# Patient Record
Sex: MALE | Race: WHITE | NOT HISPANIC OR LATINO | Employment: OTHER | URBAN - NONMETROPOLITAN AREA
[De-identification: names, ages, dates, MRNs, and addresses within clinical notes are randomized per-mention and may not be internally consistent; named-entity substitution may affect disease eponyms.]

---

## 2020-05-09 ENCOUNTER — HISTORICAL (OUTPATIENT)
Dept: ADMINISTRATIVE | Facility: HOSPITAL | Age: 55
End: 2020-05-09

## 2020-05-09 LAB
ANION GAP SERPL CALC-SCNC: 7.9 MEQ/L (ref 10–20)
BASOPHILS NFR BLD: 0 10 (ref 0–0.1)
BASOPHILS NFR BLD: 0.3 % (ref 0–1.5)
BNP SERPL-MCNC: 33 PG/ML (ref 0–136)
BUN SERPL-MCNC: 10 MG/DL (ref 7–18)
CALCIUM SERPL-MCNC: 7.9 MG/DL (ref 8.5–10.1)
CHLORIDE SERPL-SCNC: 111 MMOL/L (ref 98–107)
CO2 SERPL-SCNC: 27 MMOL/L (ref 22–32)
CREAT SERPL-MCNC: 0.49 MG/DL (ref 0.7–1.3)
EGFR: 189 ML/MIN/1.73M
EOSINOPHIL NFR BLD: 0.4 10 (ref 0–0.7)
EOSINOPHIL NFR BLD: 4.8 % (ref 0–7)
ERYTHROCYTE [DISTWIDTH] IN BLOOD BY AUTOMATED COUNT: 11.9 % (ref 11.5–14.5)
GLUCOSE SERPL-MCNC: 118 MG/DL (ref 70–99)
GRAN #: 5.48 10 (ref 2–7.5)
GRAN%: 0.2 %
GRAN%: 61.7 % (ref 50–80)
HCT VFR BLD AUTO: 36.8 % (ref 43.5–53.7)
HGB BLD-MCNC: 12.1 G/DL (ref 14.1–18.1)
IMMATURE GRANULOCYTES #: 0.02 10
LYMPH #: 2.3 10 (ref 1–3.5)
LYMPH%: 26.1 % (ref 12–50)
MAGNESIUM SERPL-MCNC: 2 MG/DL (ref 1.8–2.4)
MCH RBC QN AUTO: 30.6 PG (ref 27–31)
MCHC RBC AUTO-ENTMCNC: 32.9 G% (ref 32–35)
MCV RBC AUTO: 92.9 FL (ref 80–97)
MONO #: 0.6 10 (ref 0–0.8)
MONO%: 6.9 % (ref 0–12)
OSMOC: 283 MOSM/KG (ref 275–295)
PMV BLD AUTO: 210 10 (ref 142–424)
PMV BLD AUTO: 9.1 FL (ref 7.4–10.4)
POTASSIUM SERPL-SCNC: 3.9 MMOL/L (ref 3.5–5.1)
RBC # BLD AUTO: 3.96 M/UL (ref 4.69–6.13)
SODIUM BLD-SCNC: 142 MMOL/L (ref 136–145)
TROPONIN I SERPL DL<=0.01 NG/ML-MCNC: <0.02 NG/ML (ref 0–0.05)
WBC # BLD AUTO: 8.9 10 (ref 4–10.2)

## 2020-07-13 ENCOUNTER — HISTORICAL (OUTPATIENT)
Dept: ADMINISTRATIVE | Facility: HOSPITAL | Age: 55
End: 2020-07-13

## 2020-07-16 LAB — SARS-COV-2 RNA RESP QL NAA+PROBE: NOT DETECTED

## 2020-09-02 ENCOUNTER — HOSPITAL ENCOUNTER (EMERGENCY)
Facility: HOSPITAL | Age: 55
Discharge: HOME | End: 2020-09-02
Attending: EMERGENCY MEDICINE
Payer: MEDICAID

## 2020-09-02 VITALS
TEMPERATURE: 99 F | RESPIRATION RATE: 18 BRPM | HEIGHT: 71 IN | HEART RATE: 67 BPM | WEIGHT: 189 LBS | BODY MASS INDEX: 26.46 KG/M2 | OXYGEN SATURATION: 99 % | SYSTOLIC BLOOD PRESSURE: 165 MMHG | DIASTOLIC BLOOD PRESSURE: 95 MMHG

## 2020-09-02 DIAGNOSIS — K08.89 PAIN, DENTAL: Primary | ICD-10-CM

## 2020-09-02 PROCEDURE — 99283 EMERGENCY DEPT VISIT LOW MDM: CPT

## 2020-09-02 PROCEDURE — 25000003 PHARM REV CODE 250: Performed by: EMERGENCY MEDICINE

## 2020-09-02 RX ORDER — IBUPROFEN 600 MG/1
600 TABLET ORAL
Status: COMPLETED | OUTPATIENT
Start: 2020-09-02 | End: 2020-09-02

## 2020-09-02 RX ORDER — AZITHROMYCIN 250 MG/1
250 TABLET, FILM COATED ORAL DAILY
Qty: 6 TABLET | Refills: 0 | Status: SHIPPED | OUTPATIENT
Start: 2020-09-02 | End: 2021-05-19 | Stop reason: CLARIF

## 2020-09-02 RX ADMIN — IBUPROFEN 600 MG: 600 TABLET, FILM COATED ORAL at 01:09

## 2020-09-02 NOTE — ED PROVIDER NOTES
"Encounter Date: 9/2/2020       History     Chief Complaint   Patient presents with    Dental Pain     Left upper toothache/ swelling of mouth "for a while". Missed appt d/t covid     53 yo male here with left upper toothache for "a while." Unable to see dentist due to COVID-19 pandemic. No fever. No trismus. No swelling. Gradual onset. Similar to previous.         Review of patient's allergies indicates:   Allergen Reactions    Penicillins Shortness Of Breath     Past Medical History:   Diagnosis Date    GERD (gastroesophageal reflux disease)     Seizures      Past Surgical History:   Procedure Laterality Date    BRAIN SURGERY      HAND SURGERY      HERNIA REPAIR      tonsil and adnoids       Family History   Problem Relation Age of Onset    Heart disease Mother     Stroke Father      Social History     Tobacco Use    Smoking status: Current Some Day Smoker     Packs/day: 0.50   Substance Use Topics    Alcohol use: No    Drug use: No     Review of Systems   Constitutional: Negative.    Respiratory: Negative.    Cardiovascular: Negative.    Gastrointestinal: Negative.    All other systems reviewed and are negative.      Physical Exam     Initial Vitals [09/02/20 0131]   BP Pulse Resp Temp SpO2   (!) 170/97 67 18 99.2 °F (37.3 °C) 99 %      MAP       --         Physical Exam    Nursing note and vitals reviewed.  Constitutional: He appears well-developed and well-nourished. No distress.   HENT:   Head: Normocephalic and atraumatic.   Nose: Nose normal.   Mouth/Throat: No oropharyngeal exudate.   Poor dentition, multiple missing teeth   Eyes: Conjunctivae and EOM are normal. Right eye exhibits no discharge. Left eye exhibits no discharge. No scleral icterus.   Neck: Normal range of motion. Neck supple. No JVD present.   Cardiovascular: Normal rate, regular rhythm and normal heart sounds.   Pulmonary/Chest: Breath sounds normal. No stridor.   Abdominal: Soft. Bowel sounds are normal. He exhibits no " distension. There is no abdominal tenderness.   Musculoskeletal: Normal range of motion. No tenderness or edema.   Neurological: He is alert and oriented to person, place, and time.   Skin: Skin is warm. Capillary refill takes less than 2 seconds. No rash noted. No erythema.         ED Course   Procedures  Labs Reviewed - No data to display       Imaging Results    None                                          Clinical Impression:       ICD-10-CM ICD-9-CM   1. Pain, dental  K08.89 525.9         Disposition:   Disposition: Discharged  Condition: Stable     ED Disposition Condition    Discharge Stable        ED Prescriptions     Medication Sig Dispense Start Date End Date Auth. Provider    azithromycin (Z-CHAS) 250 MG tablet Take 1 tablet (250 mg total) by mouth once daily. Take first 2 tablets together, then 1 every day until finished. 6 tablet 9/2/2020  Garrick Dolan MD        Follow-up Information     Follow up With Specialties Details Why Contact Info    Gian Wallace MD Internal Medicine Schedule an appointment as soon as possible for a visit   [address] [place] [address] INTERNAL MEDICINE CLINIC  [facility] [address]  [phone]                                       Garrick Dolan MD  09/02/20 0139

## 2020-09-27 ENCOUNTER — HOSPITAL ENCOUNTER (EMERGENCY)
Facility: HOSPITAL | Age: 55
Discharge: HOME | End: 2020-09-27
Attending: EMERGENCY MEDICINE
Payer: MEDICAID

## 2020-09-27 VITALS
TEMPERATURE: 98 F | BODY MASS INDEX: 27.16 KG/M2 | HEIGHT: 71 IN | SYSTOLIC BLOOD PRESSURE: 142 MMHG | RESPIRATION RATE: 18 BRPM | WEIGHT: 194 LBS | OXYGEN SATURATION: 99 % | DIASTOLIC BLOOD PRESSURE: 101 MMHG | HEART RATE: 65 BPM

## 2020-09-27 DIAGNOSIS — H60.02 ABSCESS OF LEFT EARLOBE: Primary | ICD-10-CM

## 2020-09-27 PROCEDURE — 10060 I&D ABSCESS SIMPLE/SINGLE: CPT

## 2020-09-27 PROCEDURE — 25000003 PHARM REV CODE 250

## 2020-09-27 PROCEDURE — 25000003 PHARM REV CODE 250: Performed by: EMERGENCY MEDICINE

## 2020-09-27 PROCEDURE — 99283 EMERGENCY DEPT VISIT LOW MDM: CPT | Mod: 25

## 2020-09-27 RX ORDER — IBUPROFEN 400 MG/1
TABLET ORAL
Status: COMPLETED
Start: 2020-09-27 | End: 2020-09-27

## 2020-09-27 RX ORDER — CLINDAMYCIN HYDROCHLORIDE 150 MG/1
CAPSULE ORAL
Status: DISCONTINUED
Start: 2020-09-27 | End: 2020-09-27 | Stop reason: HOSPADM

## 2020-09-27 RX ORDER — LIDOCAINE HYDROCHLORIDE 10 MG/ML
1 INJECTION INFILTRATION; PERINEURAL
Status: COMPLETED | OUTPATIENT
Start: 2020-09-27 | End: 2020-09-27

## 2020-09-27 RX ORDER — CLINDAMYCIN HYDROCHLORIDE 300 MG/1
CAPSULE ORAL
Status: DISCONTINUED
Start: 2020-09-27 | End: 2020-09-27 | Stop reason: HOSPADM

## 2020-09-27 RX ORDER — IBUPROFEN 400 MG/1
400 TABLET ORAL
Status: COMPLETED | OUTPATIENT
Start: 2020-09-27 | End: 2020-09-27

## 2020-09-27 RX ORDER — LIDOCAINE HYDROCHLORIDE 10 MG/ML
INJECTION INFILTRATION; PERINEURAL
Status: COMPLETED
Start: 2020-09-27 | End: 2020-09-27

## 2020-09-27 RX ADMIN — IBUPROFEN 400 MG: 400 TABLET ORAL at 03:09

## 2020-09-27 RX ADMIN — LIDOCAINE HYDROCHLORIDE 1 ML: 10 INJECTION INFILTRATION; PERINEURAL at 02:09

## 2020-09-27 RX ADMIN — CLINDAMYCIN HYDROCHLORIDE 450 MG: 300 CAPSULE ORAL at 02:09

## 2020-09-27 RX ADMIN — LIDOCAINE HYDROCHLORIDE 1 ML: 10 INJECTION, SOLUTION INFILTRATION; PERINEURAL at 02:09

## 2020-09-27 NOTE — ED NOTES
NEUROLOGICAL:   Patient is awake , alert  and oriented x 4 . Pupils are PERRL. Gait is steady.   Moves all extremities without difficulty.   Patient reports no neuro complaints..  GCS 15    HEENT:   Head appears normocephalic  and symmetric .   Eyes appear WNL to both eyes. Patient reports no complaints  to both eyes .   Ears appear pink  and with drainage noted. Patient reports pain to left ears.   Nares appear patent . Patient reports no nose complaints .  Mouth appears moist, pink and teeth intact. Patient reports no mouth complaints.   Throat appears pink and moist . Patient reports no throat complaints.    CARDIOVASCULAR:   S1 and S2 present, no murmurs, gallops, or rubs, rate regular  and pulses palpable (2+)    On palpation no edema noted , noted to none.   Patient reports no CV complaints.  .   Patient vitals are WNL.    RESPIRATORY:   Airway Clear, Open, and Patent.  Respirations are even and unlabored.   Breath sounds clear  to all lung fields.   Patient reports no respiratory complaints.     GASTROINTESTINAL:   Abdomen is soft  and non-tender x 4 quadrants. Bowel sounds are normoactive to all quadrants .   Patient reports no GI complaints .     GENITOURINARY:   Patient reports no  complaints.     MUSCULOSKELETAL:   full range of motion to all extremities, no swelling noted , no tenderness noted and no weakness noted.   Patient reports no musculoskeletal complaints     SKIN:   Skin appears warm , dry , good turgor, color normal for race and intact. Patient reports no skin complaints.

## 2020-09-28 NOTE — ED PROVIDER NOTES
"Encounter Date: 9/27/2020       History     Chief Complaint   Patient presents with    Otalgia     "left ear pain x 1 week"     Pt is a 56 y/o M with PMHx as per below who presents with concern for swelling and pain to his left ear lobe.  Pt was given a Rx of ABX several days ago to treat for cellulitis and despite compliance with the ABX the swelling continued to progress.          Review of patient's allergies indicates:   Allergen Reactions    Penicillins Shortness Of Breath     Past Medical History:   Diagnosis Date    GERD (gastroesophageal reflux disease)     Seizures      Past Surgical History:   Procedure Laterality Date    BRAIN SURGERY      HAND SURGERY      HERNIA REPAIR      tonsil and adnoids       Family History   Problem Relation Age of Onset    Heart disease Mother     Stroke Father      Social History     Tobacco Use    Smoking status: Former Smoker     Packs/day: 0.50    Smokeless tobacco: Never Used   Substance Use Topics    Alcohol use: No    Drug use: No     Review of Systems   Skin: Positive for color change.        Abscess and cellulitis to the L ear lobe   All other systems reviewed and are negative.      Physical Exam     Initial Vitals [09/27/20 0138]   BP Pulse Resp Temp SpO2   (!) 142/101 65 18 97.9 °F (36.6 °C) 99 %      MAP       --         Physical Exam    Nursing note and vitals reviewed.  Constitutional: He appears well-developed and well-nourished. He is not diaphoretic. No distress.   HENT:   Head: Normocephalic and atraumatic.   Mouth/Throat: Oropharynx is clear and moist.   Eyes: Conjunctivae are normal. Right eye exhibits no discharge. Left eye exhibits no discharge. No scleral icterus.   Neck: No tracheal deviation present. No JVD present.   Cardiovascular: Normal rate, regular rhythm and normal heart sounds.   Pulmonary/Chest: No stridor. No respiratory distress.   Abdominal: Soft. He exhibits no distension. There is no abdominal tenderness.   Musculoskeletal: " Normal range of motion. No tenderness or edema.   Neurological: He is alert and oriented to person, place, and time. He has normal strength. GCS score is 15. GCS eye subscore is 4. GCS verbal subscore is 5. GCS motor subscore is 6.   FABIENNE with NGND's   Skin: Skin is warm and dry. Capillary refill takes less than 2 seconds. Abscess noted. No rash noted. There is erythema. No pallor.   Abscess with overlying cellulitis noted to the L ear lobe   Psychiatric: He has a normal mood and affect. His behavior is normal. Judgment and thought content normal.         ED Course   I & D - Incision and Drainage    Date/Time: 9/28/2020 3:46 AM  Location procedure was performed: [facility] EMERGENCY DEPARTMENT  Performed by: Rajan Cruz MD  Authorized by: Rajan Cruz MD   Pre-operative diagnosis: abscess of ear lobe  Post-operative diagnosis: abscess of ear lobe  Consent Done: Yes  Consent: Verbal consent obtained. Written consent not obtained.  Risks and benefits: risks, benefits and alternatives were discussed  Consent given by: patient  Patient understanding: patient states understanding of the procedure being performed  Patient consent: the patient's understanding of the procedure matches consent given  Procedure consent: procedure consent matches procedure scheduled  Patient identity confirmed: MRN and name  Type: abscess  Body area: head/neck  Location details: left external ear  Anesthesia: local infiltration    Anesthesia:  Local Anesthetic: lidocaine 1% without epinephrine  Anesthetic total: 3 mL  Scalpel size: 11  Incision type: single straight  Complexity: simple  Drainage: pus  Drainage amount: moderate  Wound treatment: wound left open and  incision  Complications: No  Estimated blood loss (mL): 5  Specimens: No  Implants: No  Patient tolerance: Patient tolerated the procedure well with no immediate complications        Labs Reviewed - No data to display       Imaging Results    None         Pt arrived alert,  afebrile, non-toxic in appearance, in no acute respiratory distress with VSS.  I and D performed w/o complication.  Pt discharged and counseled on the need to return to the nearest emergency room if they experience any other concerning symptoms.  Pt counseled to F/U outpatient with a PCP over the next week.    Rajan Cruz MD                                           Clinical Impression:       ICD-10-CM ICD-9-CM   1. Abscess of left earlobe  H60.02 380.10                          ED Disposition Condition    Discharge Stable        ED Prescriptions     None        Follow-up Information     Follow up With Specialties Details Why Contact Info Additional Information    Gian Wallace MD Internal Medicine Schedule an appointment as soon as possible for a visit in 1 week to follow-up on today's visit [address] [facility]  [address]  [place] [facility]  [facility] [address]  [phone]       [facility] Emergency Department Emergency Medicine Go to  As needed, If symptoms worsen [address] [facility] [id number] [phone] Floor [address]                                       Rajan Cruz MD  09/28/20 5845

## 2020-12-03 ENCOUNTER — HOSPITAL ENCOUNTER (EMERGENCY)
Facility: HOSPITAL | Age: 55
Discharge: HOME | End: 2020-12-03
Attending: EMERGENCY MEDICINE
Payer: MEDICAID

## 2020-12-03 VITALS
RESPIRATION RATE: 18 BRPM | HEART RATE: 61 BPM | DIASTOLIC BLOOD PRESSURE: 60 MMHG | TEMPERATURE: 98 F | OXYGEN SATURATION: 97 % | BODY MASS INDEX: 26.18 KG/M2 | SYSTOLIC BLOOD PRESSURE: 123 MMHG | HEIGHT: 71 IN | WEIGHT: 187 LBS

## 2020-12-03 DIAGNOSIS — J32.9 VIRAL SINUSITIS: Primary | ICD-10-CM

## 2020-12-03 DIAGNOSIS — B97.89 VIRAL SINUSITIS: Primary | ICD-10-CM

## 2020-12-03 PROCEDURE — 63600175 PHARM REV CODE 636 W HCPCS: Performed by: CLINICAL NURSE SPECIALIST

## 2020-12-03 PROCEDURE — 99284 EMERGENCY DEPT VISIT MOD MDM: CPT | Mod: 25

## 2020-12-03 PROCEDURE — 96372 THER/PROPH/DIAG INJ SC/IM: CPT

## 2020-12-03 RX ORDER — FLUTICASONE PROPIONATE 50 MCG
2 SPRAY, SUSPENSION (ML) NASAL DAILY
Qty: 16 G | Refills: 0 | OUTPATIENT
Start: 2020-12-03 | End: 2021-12-01

## 2020-12-03 RX ORDER — BENZONATATE 200 MG/1
200 CAPSULE ORAL 3 TIMES DAILY PRN
Qty: 30 CAPSULE | Refills: 0 | Status: SHIPPED | OUTPATIENT
Start: 2020-12-03 | End: 2020-12-13

## 2020-12-03 RX ORDER — PREDNISONE 20 MG/1
20 TABLET ORAL DAILY
Qty: 5 TABLET | Refills: 0 | Status: SHIPPED | OUTPATIENT
Start: 2020-12-03 | End: 2020-12-08

## 2020-12-03 RX ORDER — IBUPROFEN 800 MG/1
800 TABLET ORAL 3 TIMES DAILY
COMMUNITY
End: 2023-01-05

## 2020-12-03 RX ORDER — DEXAMETHASONE SODIUM PHOSPHATE 4 MG/ML
8 INJECTION, SOLUTION INTRA-ARTICULAR; INTRALESIONAL; INTRAMUSCULAR; INTRAVENOUS; SOFT TISSUE
Status: COMPLETED | OUTPATIENT
Start: 2020-12-03 | End: 2020-12-03

## 2020-12-03 RX ORDER — OMEPRAZOLE 20 MG/1
40 CAPSULE, DELAYED RELEASE ORAL DAILY
COMMUNITY
End: 2022-09-23 | Stop reason: SDUPTHER

## 2020-12-03 RX ORDER — ATORVASTATIN CALCIUM 80 MG/1
80 TABLET, FILM COATED ORAL DAILY
COMMUNITY

## 2020-12-03 RX ADMIN — DEXAMETHASONE SODIUM PHOSPHATE 8 MG: 4 INJECTION, SOLUTION INTRA-ARTICULAR; INTRALESIONAL; INTRAMUSCULAR; INTRAVENOUS; SOFT TISSUE at 12:12

## 2020-12-03 NOTE — ED PROVIDER NOTES
Encounter Date: 12/3/2020       History     Chief Complaint   Patient presents with    Sore Throat     Patient to the ER with complaints of nasal congestion, dizziness, and sore throat    Dizziness      55-year-old male presents to the ER with, nasal congestion, sore throat, dizziness, sinus drip, sinus pressure for the last few days.  Patient has not sought treatment for these issues.  Patient has not tried anything over-the-counter        Review of patient's allergies indicates:   Allergen Reactions    Penicillins Shortness Of Breath     Past Medical History:   Diagnosis Date    GERD (gastroesophageal reflux disease)     Seizures      Past Surgical History:   Procedure Laterality Date    BRAIN SURGERY      HAND SURGERY      HERNIA REPAIR      tonsil and adnoids       Family History   Problem Relation Age of Onset    Heart disease Mother     Stroke Father      Social History     Tobacco Use    Smoking status: Former Smoker     Packs/day: 0.50    Smokeless tobacco: Never Used   Substance Use Topics    Alcohol use: No    Drug use: No     Review of Systems   Constitutional: Negative for fever.   HENT: Positive for congestion, sinus pressure, sinus pain and sore throat.    Respiratory: Negative for shortness of breath.    Cardiovascular: Negative for chest pain.   Gastrointestinal: Negative for nausea.   Genitourinary: Negative for dysuria.   Musculoskeletal: Negative for back pain.   Skin: Negative for rash.   Neurological: Positive for dizziness. Negative for weakness.   Hematological: Does not bruise/bleed easily.   All other systems reviewed and are negative.      Physical Exam     Initial Vitals [12/03/20 1221]   BP Pulse Resp Temp SpO2   123/60 61 18 98 °F (36.7 °C) 97 %      MAP       --         Physical Exam    Nursing note and vitals reviewed.  Constitutional: He appears well-developed and well-nourished.   HENT:   Head: Normocephalic and atraumatic.   Mouth/Throat: Posterior oropharyngeal edema  and posterior oropharyngeal erythema present.   Eyes: Pupils are equal, round, and reactive to light.   Neck: Normal range of motion.   Cardiovascular: Normal rate and regular rhythm.   Pulmonary/Chest: Breath sounds normal.   Abdominal: Soft. Bowel sounds are normal.   Musculoskeletal: Normal range of motion.   Neurological: He is alert and oriented to person, place, and time.   Psychiatric: He has a normal mood and affect.         ED Course   Procedures  Labs Reviewed - No data to display       Imaging Results    None          Medical Decision Making:   Differential Diagnosis:    Pharyngitis, URI, sinusitis                             Clinical Impression:       ICD-10-CM ICD-9-CM   1. Viral sinusitis  J32.9 473.9    B97.89 079.99                          ED Disposition Condition    Discharge Stable        ED Prescriptions     Medication Sig Dispense Start Date End Date Auth. Provider    fluticasone propionate (FLONASE) 50 mcg/actuation nasal spray 2 sprays (100 mcg total) by Each Nostril route once daily. 2 sprays for each nostril. 16 g 12/3/2020  Mignon Fay NP    benzonatate (TESSALON) 200 MG capsule Take 1 capsule (200 mg total) by mouth 3 (three) times daily as needed for Cough. 30 capsule 12/3/2020 12/13/2020 Mignon Fay NP    predniSONE (DELTASONE) 20 MG tablet Take 1 tablet (20 mg total) by mouth once daily. for 5 days 5 tablet 12/3/2020 12/8/2020 Mignon Fay NP        Follow-up Information    None                                      Mignon Fay NP  12/03/20 [id number]

## 2020-12-03 NOTE — ED NOTES
NEUROLOGICAL:   Patient is awake , alert  and oriented x 4 . Pupils are PERRL. Gait is steady.   Moves all extremities without difficulty.   Patient reports dizziness.  GCS 15    HEENT:   Head appears normocephalic  and symmetric .   Eyes appear WNL to both eyes. Patient reports no complaints  to both eyes .   Ears appear WNL. Patient reports no complaints  to both ears.   Nares appear patent . Patient reports nasal congestion and runny nose .  Mouth appears moist, pink and teeth intact. Patient reports no mouth complaints.   Throat appears pink and moist . Patient reports sore throat.    CARDIOVASCULAR:   S1 and S2 present, no murmurs, gallops, or rubs, rate regular  and pulses palpable (2+)    On palpation no edema noted , noted to none.   Patient reports no CV complaints.  .   Patient vitals are WNL.    RESPIRATORY:   Airway Clear, Open, and Patent.  Respirations are even and unlabored.   Breath sounds clear  to all lung fields.   Patient reports no respiratory complaints.     GASTROINTESTINAL:   Abdomen is soft  and non-tender x 4 quadrants. Bowel sounds are normoactive to all quadrants .   Patient reports no GI complaints .     GENITOURINARY:   Patient reports no  complaints.     MUSCULOSKELETAL:   full range of motion to all extremities, no swelling noted , no tenderness noted and no weakness noted.   Patient reports no musculoskeletal complaints     SKIN:   Skin appears warm , dry , good turgor, color normal for race and intact. Patient reports no skin complaints.

## 2021-01-07 ENCOUNTER — HOSPITAL ENCOUNTER (EMERGENCY)
Facility: HOSPITAL | Age: 56
Discharge: HOME | End: 2021-01-07
Attending: EMERGENCY MEDICINE
Payer: MEDICAID

## 2021-01-07 VITALS
SYSTOLIC BLOOD PRESSURE: 120 MMHG | DIASTOLIC BLOOD PRESSURE: 94 MMHG | TEMPERATURE: 96 F | HEART RATE: 66 BPM | RESPIRATION RATE: 18 BRPM | OXYGEN SATURATION: 96 %

## 2021-01-07 DIAGNOSIS — R60.0 PERIPHERAL EDEMA: Primary | ICD-10-CM

## 2021-01-07 PROCEDURE — 99282 EMERGENCY DEPT VISIT SF MDM: CPT

## 2021-01-18 ENCOUNTER — HOSPITAL ENCOUNTER (EMERGENCY)
Facility: HOSPITAL | Age: 56
Discharge: HOME | End: 2021-01-18
Attending: FAMILY MEDICINE
Payer: MEDICAID

## 2021-01-18 VITALS
RESPIRATION RATE: 18 BRPM | TEMPERATURE: 98 F | SYSTOLIC BLOOD PRESSURE: 138 MMHG | HEART RATE: 63 BPM | HEIGHT: 71 IN | DIASTOLIC BLOOD PRESSURE: 84 MMHG | BODY MASS INDEX: 26.08 KG/M2 | OXYGEN SATURATION: 98 %

## 2021-01-18 DIAGNOSIS — R07.9 CHEST PAIN: ICD-10-CM

## 2021-01-18 DIAGNOSIS — R07.9 CHEST PAIN, UNSPECIFIED TYPE: Primary | ICD-10-CM

## 2021-01-18 LAB
ALBUMIN SERPL BCP-MCNC: 4.3 G/DL (ref 3.5–5.2)
ALP SERPL-CCNC: 175 U/L (ref 55–135)
ALT SERPL W/O P-5'-P-CCNC: 28 U/L (ref 10–44)
ANION GAP SERPL CALC-SCNC: 4 MMOL/L (ref 8–16)
AST SERPL-CCNC: 11 U/L (ref 10–40)
BASOPHILS # BLD AUTO: 0.03 K/UL (ref 0–0.2)
BASOPHILS NFR BLD: 0.3 % (ref 0–1.9)
BILIRUB SERPL-MCNC: 0.2 MG/DL (ref 0.1–1)
BUN SERPL-MCNC: 8 MG/DL (ref 6–20)
CALCIUM SERPL-MCNC: 9.1 MG/DL (ref 8.7–10.5)
CHLORIDE SERPL-SCNC: 104 MMOL/L (ref 95–110)
CO2 SERPL-SCNC: 32 MMOL/L (ref 23–29)
CREAT SERPL-MCNC: 0.6 MG/DL (ref 0.5–1.4)
D DIMER PPP IA.FEU-MCNC: 0.23 MG/L FEU
DIFFERENTIAL METHOD: ABNORMAL
EOSINOPHIL # BLD AUTO: 0.5 K/UL (ref 0–0.5)
EOSINOPHIL NFR BLD: 5 % (ref 0–8)
ERYTHROCYTE [DISTWIDTH] IN BLOOD BY AUTOMATED COUNT: 11.8 % (ref 11.5–14.5)
EST. GFR  (AFRICAN AMERICAN): >60 ML/MIN/1.73 M^2
EST. GFR  (NON AFRICAN AMERICAN): >60 ML/MIN/1.73 M^2
GLUCOSE SERPL-MCNC: 95 MG/DL (ref 70–110)
HCT VFR BLD AUTO: 40.5 % (ref 40–54)
HGB BLD-MCNC: 13.6 G/DL (ref 14–18)
IMM GRANULOCYTES # BLD AUTO: 0.03 K/UL (ref 0–0.04)
IMM GRANULOCYTES NFR BLD AUTO: 0.3 % (ref 0–0.5)
LYMPHOCYTES # BLD AUTO: 2.6 K/UL (ref 1–4.8)
LYMPHOCYTES NFR BLD: 27.3 % (ref 18–48)
MCH RBC QN AUTO: 29.8 PG (ref 27–31)
MCHC RBC AUTO-ENTMCNC: 33.6 G/DL (ref 32–36)
MCV RBC AUTO: 89 FL (ref 82–98)
MONOCYTES # BLD AUTO: 0.6 K/UL (ref 0.3–1)
MONOCYTES NFR BLD: 6.6 % (ref 4–15)
NEUTROPHILS # BLD AUTO: 5.8 K/UL (ref 1.8–7.7)
NEUTROPHILS NFR BLD: 60.5 % (ref 38–73)
NRBC BLD-RTO: 0 /100 WBC
NT-PROBNP SERPL-MCNC: 11 PG/ML (ref 5–900)
PLATELET # BLD AUTO: 250 K/UL (ref 150–350)
PMV BLD AUTO: 9.9 FL (ref 9.2–12.9)
POTASSIUM SERPL-SCNC: 3.5 MMOL/L (ref 3.5–5.1)
PROT SERPL-MCNC: 8.3 G/DL (ref 6–8.4)
RBC # BLD AUTO: 4.57 M/UL (ref 4.6–6.2)
SODIUM SERPL-SCNC: 140 MMOL/L (ref 136–145)
TROPONIN I SERPL DL<=0.01 NG/ML-MCNC: <0.02 NG/ML (ref 0–0.03)
WBC # BLD AUTO: 9.64 K/UL (ref 3.9–12.7)

## 2021-01-18 PROCEDURE — 83880 ASSAY OF NATRIURETIC PEPTIDE: CPT

## 2021-01-18 PROCEDURE — 85379 FIBRIN DEGRADATION QUANT: CPT

## 2021-01-18 PROCEDURE — 84484 ASSAY OF TROPONIN QUANT: CPT

## 2021-01-18 PROCEDURE — 85025 COMPLETE CBC W/AUTO DIFF WBC: CPT

## 2021-01-18 PROCEDURE — 93010 ELECTROCARDIOGRAM REPORT: CPT | Mod: ,,, | Performed by: INTERNAL MEDICINE

## 2021-01-18 PROCEDURE — 36000 PLACE NEEDLE IN VEIN: CPT

## 2021-01-18 PROCEDURE — 80053 COMPREHEN METABOLIC PANEL: CPT

## 2021-01-18 PROCEDURE — 93005 ELECTROCARDIOGRAM TRACING: CPT

## 2021-01-18 PROCEDURE — 36415 COLL VENOUS BLD VENIPUNCTURE: CPT

## 2021-01-18 PROCEDURE — 25000003 PHARM REV CODE 250: Performed by: FAMILY MEDICINE

## 2021-01-18 PROCEDURE — 99285 EMERGENCY DEPT VISIT HI MDM: CPT | Mod: 25

## 2021-01-18 PROCEDURE — 93010 EKG 12-LEAD: ICD-10-PCS | Mod: ,,, | Performed by: INTERNAL MEDICINE

## 2021-01-18 RX ORDER — FUROSEMIDE 40 MG/1
40 TABLET ORAL DAILY
COMMUNITY

## 2021-01-18 RX ORDER — ASPIRIN 325 MG
325 TABLET ORAL
Status: COMPLETED | OUTPATIENT
Start: 2021-01-18 | End: 2021-01-18

## 2021-01-18 RX ADMIN — ASPIRIN 325 MG ORAL TABLET 325 MG: 325 PILL ORAL at 06:01

## 2021-03-21 ENCOUNTER — HOSPITAL ENCOUNTER (EMERGENCY)
Facility: HOSPITAL | Age: 56
Discharge: HOME | End: 2021-03-21
Attending: EMERGENCY MEDICINE
Payer: MEDICAID

## 2021-03-21 VITALS
DIASTOLIC BLOOD PRESSURE: 58 MMHG | OXYGEN SATURATION: 99 % | BODY MASS INDEX: 26.46 KG/M2 | HEIGHT: 71 IN | HEART RATE: 58 BPM | TEMPERATURE: 98 F | WEIGHT: 189 LBS | SYSTOLIC BLOOD PRESSURE: 108 MMHG | RESPIRATION RATE: 16 BRPM

## 2021-03-21 DIAGNOSIS — R42 DIZZINESS: ICD-10-CM

## 2021-03-21 LAB
ALBUMIN SERPL BCP-MCNC: 3.7 G/DL (ref 3.5–5.2)
ALP SERPL-CCNC: 165 U/L (ref 55–135)
ALT SERPL W/O P-5'-P-CCNC: 24 U/L (ref 10–44)
ANION GAP SERPL CALC-SCNC: 5 MMOL/L (ref 8–16)
AST SERPL-CCNC: 11 U/L (ref 10–40)
BASOPHILS # BLD AUTO: 0.03 K/UL (ref 0–0.2)
BASOPHILS NFR BLD: 0.4 % (ref 0–1.9)
BILIRUB SERPL-MCNC: 0.2 MG/DL (ref 0.1–1)
BUN SERPL-MCNC: 10 MG/DL (ref 6–20)
CALCIUM SERPL-MCNC: 8 MG/DL (ref 8.7–10.5)
CARBAMAZEPINE SERPL-MCNC: 5.7 UG/ML (ref 4–12)
CHLORIDE SERPL-SCNC: 108 MMOL/L (ref 95–110)
CO2 SERPL-SCNC: 30 MMOL/L (ref 23–29)
CREAT SERPL-MCNC: 0.5 MG/DL (ref 0.5–1.4)
DIFFERENTIAL METHOD: ABNORMAL
EOSINOPHIL # BLD AUTO: 0.3 K/UL (ref 0–0.5)
EOSINOPHIL NFR BLD: 4.6 % (ref 0–8)
ERYTHROCYTE [DISTWIDTH] IN BLOOD BY AUTOMATED COUNT: 11.9 % (ref 11.5–14.5)
EST. GFR  (AFRICAN AMERICAN): >60 ML/MIN/1.73 M^2
EST. GFR  (NON AFRICAN AMERICAN): >60 ML/MIN/1.73 M^2
GLUCOSE SERPL-MCNC: 107 MG/DL (ref 70–110)
HCT VFR BLD AUTO: 36.9 % (ref 40–54)
HGB BLD-MCNC: 12.2 G/DL (ref 14–18)
IMM GRANULOCYTES # BLD AUTO: 0.02 K/UL (ref 0–0.04)
IMM GRANULOCYTES NFR BLD AUTO: 0.3 % (ref 0–0.5)
LYMPHOCYTES # BLD AUTO: 1.7 K/UL (ref 1–4.8)
LYMPHOCYTES NFR BLD: 25.8 % (ref 18–48)
MCH RBC QN AUTO: 29.6 PG (ref 27–31)
MCHC RBC AUTO-ENTMCNC: 33.1 G/DL (ref 32–36)
MCV RBC AUTO: 90 FL (ref 82–98)
MONOCYTES # BLD AUTO: 0.4 K/UL (ref 0.3–1)
MONOCYTES NFR BLD: 6.4 % (ref 4–15)
NEUTROPHILS # BLD AUTO: 4.2 K/UL (ref 1.8–7.7)
NEUTROPHILS NFR BLD: 62.5 % (ref 38–73)
NRBC BLD-RTO: 0 /100 WBC
PHENYTOIN SERPL-MCNC: 9.7 UG/ML (ref 10–20)
PLATELET # BLD AUTO: 231 K/UL (ref 150–350)
PMV BLD AUTO: 9.3 FL (ref 9.2–12.9)
POTASSIUM SERPL-SCNC: 4.3 MMOL/L (ref 3.5–5.1)
PROT SERPL-MCNC: 7.3 G/DL (ref 6–8.4)
RBC # BLD AUTO: 4.12 M/UL (ref 4.6–6.2)
SODIUM SERPL-SCNC: 143 MMOL/L (ref 136–145)
WBC # BLD AUTO: 6.74 K/UL (ref 3.9–12.7)

## 2021-03-21 PROCEDURE — 93010 EKG 12-LEAD: ICD-10-PCS | Mod: ,,, | Performed by: INTERNAL MEDICINE

## 2021-03-21 PROCEDURE — 93005 ELECTROCARDIOGRAM TRACING: CPT

## 2021-03-21 PROCEDURE — 80053 COMPREHEN METABOLIC PANEL: CPT | Performed by: EMERGENCY MEDICINE

## 2021-03-21 PROCEDURE — 36415 COLL VENOUS BLD VENIPUNCTURE: CPT | Performed by: EMERGENCY MEDICINE

## 2021-03-21 PROCEDURE — 80185 ASSAY OF PHENYTOIN TOTAL: CPT | Performed by: EMERGENCY MEDICINE

## 2021-03-21 PROCEDURE — 99284 EMERGENCY DEPT VISIT MOD MDM: CPT | Mod: 25

## 2021-03-21 PROCEDURE — 80156 ASSAY CARBAMAZEPINE TOTAL: CPT | Performed by: EMERGENCY MEDICINE

## 2021-03-21 PROCEDURE — 85025 COMPLETE CBC W/AUTO DIFF WBC: CPT | Performed by: EMERGENCY MEDICINE

## 2021-03-21 PROCEDURE — 93010 ELECTROCARDIOGRAM REPORT: CPT | Mod: ,,, | Performed by: INTERNAL MEDICINE

## 2021-03-21 RX ORDER — MECLIZINE HYDROCHLORIDE 25 MG/1
25 TABLET ORAL 3 TIMES DAILY PRN
Qty: 20 TABLET | Refills: 0 | Status: SHIPPED | OUTPATIENT
Start: 2021-03-21

## 2021-04-06 ENCOUNTER — HOSPITAL ENCOUNTER (EMERGENCY)
Facility: HOSPITAL | Age: 56
Discharge: HOME | End: 2021-04-06
Attending: FAMILY MEDICINE
Payer: MEDICAID

## 2021-04-06 VITALS
HEART RATE: 68 BPM | SYSTOLIC BLOOD PRESSURE: 163 MMHG | HEIGHT: 71 IN | TEMPERATURE: 98 F | RESPIRATION RATE: 18 BRPM | BODY MASS INDEX: 27.07 KG/M2 | OXYGEN SATURATION: 98 % | WEIGHT: 193.38 LBS | DIASTOLIC BLOOD PRESSURE: 100 MMHG

## 2021-04-06 DIAGNOSIS — G89.29 CHRONIC BILATERAL LOW BACK PAIN WITHOUT SCIATICA: Primary | ICD-10-CM

## 2021-04-06 DIAGNOSIS — M54.50 CHRONIC BILATERAL LOW BACK PAIN WITHOUT SCIATICA: Primary | ICD-10-CM

## 2021-04-06 PROCEDURE — 99284 EMERGENCY DEPT VISIT MOD MDM: CPT | Mod: 25

## 2021-04-06 PROCEDURE — 63600175 PHARM REV CODE 636 W HCPCS: Performed by: NURSE PRACTITIONER

## 2021-04-06 PROCEDURE — 96372 THER/PROPH/DIAG INJ SC/IM: CPT

## 2021-04-06 PROCEDURE — 25000003 PHARM REV CODE 250: Performed by: NURSE PRACTITIONER

## 2021-04-06 RX ORDER — DEXAMETHASONE SODIUM PHOSPHATE 4 MG/ML
8 INJECTION, SOLUTION INTRA-ARTICULAR; INTRALESIONAL; INTRAMUSCULAR; INTRAVENOUS; SOFT TISSUE
Status: COMPLETED | OUTPATIENT
Start: 2021-04-06 | End: 2021-04-06

## 2021-04-06 RX ORDER — HYDROCODONE BITARTRATE AND ACETAMINOPHEN 5; 325 MG/1; MG/1
1 TABLET ORAL
Status: COMPLETED | OUTPATIENT
Start: 2021-04-06 | End: 2021-04-06

## 2021-04-06 RX ORDER — METHOCARBAMOL 500 MG/1
1000 TABLET, FILM COATED ORAL 2 TIMES DAILY PRN
Qty: 20 TABLET | Refills: 0 | Status: SHIPPED | OUTPATIENT
Start: 2021-04-06 | End: 2021-04-11

## 2021-04-06 RX ADMIN — HYDROCODONE BITARTRATE AND ACETAMINOPHEN 1 TABLET: 5; 325 TABLET ORAL at 04:04

## 2021-04-06 RX ADMIN — DEXAMETHASONE SODIUM PHOSPHATE 8 MG: 4 INJECTION, SOLUTION INTRA-ARTICULAR; INTRALESIONAL; INTRAMUSCULAR; INTRAVENOUS; SOFT TISSUE at 04:04

## 2021-04-13 ENCOUNTER — HOSPITAL ENCOUNTER (EMERGENCY)
Facility: HOSPITAL | Age: 56
Discharge: HOME | End: 2021-04-13
Attending: FAMILY MEDICINE
Payer: MEDICAID

## 2021-04-13 VITALS
DIASTOLIC BLOOD PRESSURE: 97 MMHG | HEART RATE: 75 BPM | SYSTOLIC BLOOD PRESSURE: 131 MMHG | WEIGHT: 188 LBS | RESPIRATION RATE: 16 BRPM | HEIGHT: 71 IN | OXYGEN SATURATION: 99 % | TEMPERATURE: 98 F | BODY MASS INDEX: 26.32 KG/M2

## 2021-04-13 DIAGNOSIS — S30.860A INSECT BITE OF LOWER BACK, INITIAL ENCOUNTER: Primary | ICD-10-CM

## 2021-04-13 DIAGNOSIS — R09.81 NASAL CONGESTION: ICD-10-CM
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
## 2021-04-13 PROCEDURE — 99284 EMERGENCY DEPT VISIT MOD MDM: CPT

## 2021-04-13 RX ORDER — SULFAMETHOXAZOLE AND TRIMETHOPRIM 800; 160 MG/1; MG/1
1 TABLET ORAL 2 TIMES DAILY
Qty: 14 TABLET | Refills: 0 | Status: SHIPPED | OUTPATIENT
Start: 2021-04-13 | End: 2021-04-20

## 2021-04-13 RX ORDER — PREDNISONE 20 MG/1
20 TABLET ORAL DAILY
Qty: 5 TABLET | Refills: 0 | Status: SHIPPED | OUTPATIENT
Start: 2021-04-13 | End: 2021-04-18

## 2021-04-25 ENCOUNTER — HOSPITAL ENCOUNTER (EMERGENCY)
Facility: HOSPITAL | Age: 56
Discharge: HOME | End: 2021-04-25
Attending: EMERGENCY MEDICINE
Payer: MEDICAID

## 2021-04-25 VITALS
HEIGHT: 71 IN | HEART RATE: 55 BPM | RESPIRATION RATE: 14 BRPM | WEIGHT: 188 LBS | DIASTOLIC BLOOD PRESSURE: 79 MMHG | OXYGEN SATURATION: 98 % | SYSTOLIC BLOOD PRESSURE: 155 MMHG | BODY MASS INDEX: 26.32 KG/M2 | TEMPERATURE: 98 F

## 2021-04-25 DIAGNOSIS — K21.9 GASTROESOPHAGEAL REFLUX DISEASE, UNSPECIFIED WHETHER ESOPHAGITIS PRESENT: Primary | ICD-10-CM

## 2021-04-25 DIAGNOSIS — R07.9 CHEST PAIN: ICD-10-CM

## 2021-04-25 LAB
ALBUMIN SERPL BCP-MCNC: 3.8 G/DL (ref 3.5–5.2)
ALP SERPL-CCNC: 176 U/L (ref 55–135)
ALT SERPL W/O P-5'-P-CCNC: 37 U/L (ref 10–44)
ANION GAP SERPL CALC-SCNC: 4 MMOL/L (ref 8–16)
AST SERPL-CCNC: 22 U/L (ref 10–40)
BASOPHILS # BLD AUTO: 0.02 K/UL (ref 0–0.2)
BASOPHILS NFR BLD: 0.3 % (ref 0–1.9)
BILIRUB SERPL-MCNC: 0.1 MG/DL (ref 0.1–1)
BUN SERPL-MCNC: 17 MG/DL (ref 6–20)
CALCIUM SERPL-MCNC: 8.6 MG/DL (ref 8.7–10.5)
CHLORIDE SERPL-SCNC: 109 MMOL/L (ref 95–110)
CO2 SERPL-SCNC: 29 MMOL/L (ref 23–29)
CREAT SERPL-MCNC: 0.5 MG/DL (ref 0.5–1.4)
DIFFERENTIAL METHOD: ABNORMAL
EOSINOPHIL # BLD AUTO: 0.3 K/UL (ref 0–0.5)
EOSINOPHIL NFR BLD: 3.6 % (ref 0–8)
ERYTHROCYTE [DISTWIDTH] IN BLOOD BY AUTOMATED COUNT: 11.8 % (ref 11.5–14.5)
EST. GFR  (AFRICAN AMERICAN): >60 ML/MIN/1.73 M^2
EST. GFR  (NON AFRICAN AMERICAN): >60 ML/MIN/1.73 M^2
GLUCOSE SERPL-MCNC: 118 MG/DL (ref 70–110)
HCT VFR BLD AUTO: 40 % (ref 40–54)
HGB BLD-MCNC: 12.9 G/DL (ref 14–18)
IMM GRANULOCYTES # BLD AUTO: 0.02 K/UL (ref 0–0.04)
IMM GRANULOCYTES NFR BLD AUTO: 0.3 % (ref 0–0.5)
LIPASE SERPL-CCNC: 80 U/L (ref 23–300)
LYMPHOCYTES # BLD AUTO: 2.8 K/UL (ref 1–4.8)
LYMPHOCYTES NFR BLD: 35.4 % (ref 18–48)
MCH RBC QN AUTO: 29.4 PG (ref 27–31)
MCHC RBC AUTO-ENTMCNC: 32.3 G/DL (ref 32–36)
MCV RBC AUTO: 91 FL (ref 82–98)
MONOCYTES # BLD AUTO: 0.5 K/UL (ref 0.3–1)
MONOCYTES NFR BLD: 6.1 % (ref 4–15)
NEUTROPHILS # BLD AUTO: 4.2 K/UL (ref 1.8–7.7)
NEUTROPHILS NFR BLD: 54.3 % (ref 38–73)
NRBC BLD-RTO: 0 /100 WBC
NT-PROBNP SERPL-MCNC: 13 PG/ML (ref 5–900)
PLATELET # BLD AUTO: 236 K/UL (ref 150–450)
PMV BLD AUTO: 9.4 FL (ref 9.2–12.9)
POTASSIUM SERPL-SCNC: 3.9 MMOL/L (ref 3.5–5.1)
PROT SERPL-MCNC: 7.5 G/DL (ref 6–8.4)
RBC # BLD AUTO: 4.39 M/UL (ref 4.6–6.2)
SODIUM SERPL-SCNC: 142 MMOL/L (ref 136–145)
TROPONIN I SERPL DL<=0.01 NG/ML-MCNC: <0.02 NG/ML (ref 0–0.03)
TROPONIN I SERPL DL<=0.01 NG/ML-MCNC: <0.02 NG/ML (ref 0–0.03)
WBC # BLD AUTO: 7.76 K/UL (ref 3.9–12.7)

## 2021-04-25 PROCEDURE — 84484 ASSAY OF TROPONIN QUANT: CPT | Performed by: EMERGENCY MEDICINE

## 2021-04-25 PROCEDURE — 96361 HYDRATE IV INFUSION ADD-ON: CPT

## 2021-04-25 PROCEDURE — 80053 COMPREHEN METABOLIC PANEL: CPT | Performed by: EMERGENCY MEDICINE

## 2021-04-25 PROCEDURE — 25000003 PHARM REV CODE 250: Performed by: EMERGENCY MEDICINE

## 2021-04-25 PROCEDURE — 85025 COMPLETE CBC W/AUTO DIFF WBC: CPT | Performed by: EMERGENCY MEDICINE

## 2021-04-25 PROCEDURE — 93005 ELECTROCARDIOGRAM TRACING: CPT

## 2021-04-25 PROCEDURE — 93010 EKG 12-LEAD: ICD-10-PCS | Mod: ,,, | Performed by: STUDENT IN AN ORGANIZED HEALTH CARE EDUCATION/TRAINING PROGRAM

## 2021-04-25 PROCEDURE — 96374 THER/PROPH/DIAG INJ IV PUSH: CPT

## 2021-04-25 PROCEDURE — 99285 EMERGENCY DEPT VISIT HI MDM: CPT | Mod: 25

## 2021-04-25 PROCEDURE — 83880 ASSAY OF NATRIURETIC PEPTIDE: CPT | Performed by: EMERGENCY MEDICINE

## 2021-04-25 PROCEDURE — 93010 ELECTROCARDIOGRAM REPORT: CPT | Mod: ,,, | Performed by: STUDENT IN AN ORGANIZED HEALTH CARE EDUCATION/TRAINING PROGRAM

## 2021-04-25 PROCEDURE — 36415 COLL VENOUS BLD VENIPUNCTURE: CPT | Performed by: EMERGENCY MEDICINE

## 2021-04-25 PROCEDURE — 83690 ASSAY OF LIPASE: CPT | Performed by: EMERGENCY MEDICINE

## 2021-04-25 RX ORDER — SUCRALFATE 1 G/1
1 TABLET ORAL 3 TIMES DAILY
Qty: 90 TABLET | Refills: 0 | Status: SHIPPED | OUTPATIENT
Start: 2021-04-25 | End: 2021-11-01

## 2021-04-25 RX ORDER — TRAMADOL HYDROCHLORIDE 50 MG/1
50 TABLET ORAL EVERY 6 HOURS PRN
Qty: 12 TABLET | Refills: 0 | Status: SHIPPED | OUTPATIENT
Start: 2021-04-25

## 2021-04-25 RX ORDER — FAMOTIDINE 10 MG/ML
20 INJECTION INTRAVENOUS
Status: COMPLETED | OUTPATIENT
Start: 2021-04-25 | End: 2021-04-25

## 2021-04-25 RX ORDER — ONDANSETRON 4 MG/1
4 TABLET, FILM COATED ORAL EVERY 6 HOURS
Qty: 12 TABLET | Refills: 0 | Status: SHIPPED | OUTPATIENT
Start: 2021-04-25 | End: 2021-08-20 | Stop reason: SDUPTHER

## 2021-04-25 RX ADMIN — LIDOCAINE HYDROCHLORIDE: 20 SOLUTION ORAL; TOPICAL at 03:04

## 2021-04-25 RX ADMIN — SODIUM CHLORIDE 1000 ML: 0.9 INJECTION, SOLUTION INTRAVENOUS at 03:04

## 2021-04-25 RX ADMIN — FAMOTIDINE 20 MG: 10 INJECTION INTRAVENOUS at 03:04

## 2021-05-19 ENCOUNTER — HOSPITAL ENCOUNTER (EMERGENCY)
Facility: HOSPITAL | Age: 56
Discharge: HOME | End: 2021-05-19
Attending: FAMILY MEDICINE
Payer: MEDICAID

## 2021-05-19 VITALS
HEART RATE: 77 BPM | HEIGHT: 71 IN | DIASTOLIC BLOOD PRESSURE: 90 MMHG | SYSTOLIC BLOOD PRESSURE: 137 MMHG | RESPIRATION RATE: 18 BRPM | BODY MASS INDEX: 26.32 KG/M2 | OXYGEN SATURATION: 98 % | WEIGHT: 188 LBS | TEMPERATURE: 98 F

## 2021-05-19 DIAGNOSIS — M25.561 ACUTE PAIN OF RIGHT KNEE: Primary | ICD-10-CM

## 2021-05-19 PROCEDURE — 96372 THER/PROPH/DIAG INJ SC/IM: CPT

## 2021-05-19 PROCEDURE — 63600175 PHARM REV CODE 636 W HCPCS: Performed by: NURSE PRACTITIONER

## 2021-05-19 PROCEDURE — 99284 EMERGENCY DEPT VISIT MOD MDM: CPT | Mod: 25

## 2021-05-19 RX ORDER — DEXAMETHASONE SODIUM PHOSPHATE 4 MG/ML
8 INJECTION, SOLUTION INTRA-ARTICULAR; INTRALESIONAL; INTRAMUSCULAR; INTRAVENOUS; SOFT TISSUE
Status: COMPLETED | OUTPATIENT
Start: 2021-05-19 | End: 2021-05-19

## 2021-05-19 RX ORDER — METHYLPREDNISOLONE 4 MG/1
TABLET ORAL
Qty: 1 PACKAGE | Refills: 0 | Status: SHIPPED | OUTPATIENT
Start: 2021-05-19 | End: 2021-06-09

## 2021-05-19 RX ADMIN — DEXAMETHASONE SODIUM PHOSPHATE 8 MG: 4 INJECTION, SOLUTION INTRA-ARTICULAR; INTRALESIONAL; INTRAMUSCULAR; INTRAVENOUS; SOFT TISSUE at 04:05

## 2021-06-09 ENCOUNTER — HOSPITAL ENCOUNTER (EMERGENCY)
Facility: HOSPITAL | Age: 56
Discharge: HOME | End: 2021-06-09
Attending: EMERGENCY MEDICINE
Payer: MEDICAID

## 2021-06-09 VITALS
HEART RATE: 74 BPM | WEIGHT: 190 LBS | RESPIRATION RATE: 16 BRPM | BODY MASS INDEX: 26.6 KG/M2 | SYSTOLIC BLOOD PRESSURE: 107 MMHG | OXYGEN SATURATION: 97 % | HEIGHT: 71 IN | DIASTOLIC BLOOD PRESSURE: 79 MMHG | TEMPERATURE: 98 F

## 2021-06-09 DIAGNOSIS — M54.50 ACUTE BILATERAL LOW BACK PAIN WITHOUT SCIATICA: Primary | ICD-10-CM

## 2021-06-09 PROCEDURE — 63600175 PHARM REV CODE 636 W HCPCS: Performed by: NURSE PRACTITIONER

## 2021-06-09 PROCEDURE — 99284 EMERGENCY DEPT VISIT MOD MDM: CPT | Mod: 25

## 2021-06-09 PROCEDURE — 96372 THER/PROPH/DIAG INJ SC/IM: CPT

## 2021-06-09 RX ORDER — HYDROCODONE BITARTRATE AND ACETAMINOPHEN 5; 325 MG/1; MG/1
1 TABLET ORAL EVERY 8 HOURS PRN
Qty: 9 TABLET | Refills: 0 | Status: SHIPPED | OUTPATIENT
Start: 2021-06-09 | End: 2021-06-12

## 2021-06-09 RX ORDER — DEXAMETHASONE SODIUM PHOSPHATE 4 MG/ML
8 INJECTION, SOLUTION INTRA-ARTICULAR; INTRALESIONAL; INTRAMUSCULAR; INTRAVENOUS; SOFT TISSUE
Status: COMPLETED | OUTPATIENT
Start: 2021-06-09 | End: 2021-06-09

## 2021-06-09 RX ORDER — METHOCARBAMOL 500 MG/1
1000 TABLET, FILM COATED ORAL 2 TIMES DAILY PRN
Qty: 20 TABLET | Refills: 0 | Status: SHIPPED | OUTPATIENT
Start: 2021-06-09 | End: 2021-06-14

## 2021-06-09 RX ADMIN — DEXAMETHASONE SODIUM PHOSPHATE 8 MG: 4 INJECTION INTRA-ARTICULAR; INTRALESIONAL; INTRAMUSCULAR; INTRAVENOUS; SOFT TISSUE at 03:06

## 2021-06-10 ENCOUNTER — PATIENT OUTREACH (OUTPATIENT)
Dept: EMERGENCY MEDICINE | Facility: HOSPITAL | Age: 56
End: 2021-06-10

## 2021-06-11 ENCOUNTER — HOSPITAL ENCOUNTER (EMERGENCY)
Facility: HOSPITAL | Age: 56
Discharge: HOME | End: 2021-06-11
Attending: EMERGENCY MEDICINE
Payer: MEDICAID

## 2021-06-11 VITALS
HEART RATE: 69 BPM | RESPIRATION RATE: 18 BRPM | TEMPERATURE: 98 F | HEIGHT: 70 IN | BODY MASS INDEX: 27.77 KG/M2 | SYSTOLIC BLOOD PRESSURE: 121 MMHG | DIASTOLIC BLOOD PRESSURE: 81 MMHG | WEIGHT: 194 LBS | OXYGEN SATURATION: 99 %

## 2021-06-11 VITALS
HEART RATE: 92 BPM | SYSTOLIC BLOOD PRESSURE: 154 MMHG | HEIGHT: 70 IN | DIASTOLIC BLOOD PRESSURE: 91 MMHG | TEMPERATURE: 99 F | BODY MASS INDEX: 27.92 KG/M2 | WEIGHT: 195 LBS | RESPIRATION RATE: 17 BRPM | OXYGEN SATURATION: 95 %

## 2021-06-11 DIAGNOSIS — M62.830 SPASM OF MUSCLE OF LOWER BACK: Primary | ICD-10-CM

## 2021-06-11 DIAGNOSIS — S39.012A STRAIN OF LUMBAR REGION, INITIAL ENCOUNTER: Primary | ICD-10-CM

## 2021-06-11 DIAGNOSIS — M62.838 MUSCLE SPASM: ICD-10-CM

## 2021-06-11 DIAGNOSIS — M54.9 BACK PAIN, UNSPECIFIED BACK LOCATION, UNSPECIFIED BACK PAIN LATERALITY, UNSPECIFIED CHRONICITY: ICD-10-CM

## 2021-06-11 PROCEDURE — 99284 EMERGENCY DEPT VISIT MOD MDM: CPT | Mod: 25,27

## 2021-06-11 PROCEDURE — 63600175 PHARM REV CODE 636 W HCPCS: Performed by: NURSE PRACTITIONER

## 2021-06-11 PROCEDURE — 63600175 PHARM REV CODE 636 W HCPCS: Performed by: EMERGENCY MEDICINE

## 2021-06-11 PROCEDURE — 99284 EMERGENCY DEPT VISIT MOD MDM: CPT | Mod: 25

## 2021-06-11 PROCEDURE — 25000003 PHARM REV CODE 250: Performed by: EMERGENCY MEDICINE

## 2021-06-11 PROCEDURE — 25000003 PHARM REV CODE 250: Performed by: NURSE PRACTITIONER

## 2021-06-11 PROCEDURE — 96372 THER/PROPH/DIAG INJ SC/IM: CPT

## 2021-06-11 RX ORDER — ORPHENADRINE CITRATE 30 MG/ML
60 INJECTION INTRAMUSCULAR; INTRAVENOUS
Status: COMPLETED | OUTPATIENT
Start: 2021-06-11 | End: 2021-06-11

## 2021-06-11 RX ORDER — LIDOCAINE 50 MG/G
2 PATCH TOPICAL
Status: DISCONTINUED | OUTPATIENT
Start: 2021-06-11 | End: 2021-06-11 | Stop reason: HOSPADM

## 2021-06-11 RX ORDER — HYDROCODONE BITARTRATE AND ACETAMINOPHEN 10; 325 MG/1; MG/1
1 TABLET ORAL
Status: COMPLETED | OUTPATIENT
Start: 2021-06-11 | End: 2021-06-11

## 2021-06-11 RX ORDER — CYCLOBENZAPRINE HCL 10 MG
10 TABLET ORAL 3 TIMES DAILY PRN
Qty: 15 TABLET | Refills: 0 | Status: SHIPPED | OUTPATIENT
Start: 2021-06-11 | End: 2021-06-16

## 2021-06-11 RX ORDER — KETOROLAC TROMETHAMINE 30 MG/ML
30 INJECTION, SOLUTION INTRAMUSCULAR; INTRAVENOUS
Status: COMPLETED | OUTPATIENT
Start: 2021-06-11 | End: 2021-06-11

## 2021-06-11 RX ADMIN — ORPHENADRINE CITRATE 60 MG: 60 INJECTION INTRAMUSCULAR; INTRAVENOUS at 07:06

## 2021-06-11 RX ADMIN — HYDROCODONE BITARTRATE AND ACETAMINOPHEN 1 TABLET: 10; 325 TABLET ORAL at 07:06

## 2021-06-11 RX ADMIN — KETOROLAC TROMETHAMINE 30 MG: 30 INJECTION, SOLUTION INTRAMUSCULAR at 05:06

## 2021-06-11 RX ADMIN — LIDOCAINE 2 PATCH: 50 PATCH TOPICAL at 06:06

## 2021-07-19 ENCOUNTER — HOSPITAL ENCOUNTER (EMERGENCY)
Facility: HOSPITAL | Age: 56
Discharge: HOME | End: 2021-07-19
Attending: EMERGENCY MEDICINE
Payer: MEDICAID

## 2021-07-19 VITALS
HEART RATE: 78 BPM | RESPIRATION RATE: 16 BRPM | DIASTOLIC BLOOD PRESSURE: 64 MMHG | TEMPERATURE: 98 F | OXYGEN SATURATION: 95 % | SYSTOLIC BLOOD PRESSURE: 126 MMHG

## 2021-07-19 DIAGNOSIS — B34.9 ACUTE VIRAL SYNDROME: Primary | ICD-10-CM

## 2021-07-19 LAB
CTP QC/QA: YES
SARS-COV-2 RDRP RESP QL NAA+PROBE: NEGATIVE

## 2021-07-19 PROCEDURE — 96372 THER/PROPH/DIAG INJ SC/IM: CPT

## 2021-07-19 PROCEDURE — 99284 EMERGENCY DEPT VISIT MOD MDM: CPT | Mod: 25

## 2021-07-19 PROCEDURE — 25000003 PHARM REV CODE 250: Performed by: EMERGENCY MEDICINE

## 2021-07-19 PROCEDURE — U0002 COVID-19 LAB TEST NON-CDC: HCPCS | Performed by: EMERGENCY MEDICINE

## 2021-07-19 PROCEDURE — 63600175 PHARM REV CODE 636 W HCPCS: Performed by: EMERGENCY MEDICINE

## 2021-07-19 RX ORDER — DEXAMETHASONE SODIUM PHOSPHATE 4 MG/ML
4 INJECTION, SOLUTION INTRA-ARTICULAR; INTRALESIONAL; INTRAMUSCULAR; INTRAVENOUS; SOFT TISSUE
Status: COMPLETED | OUTPATIENT
Start: 2021-07-19 | End: 2021-07-19

## 2021-07-19 RX ORDER — ONDANSETRON 4 MG/1
4 TABLET, ORALLY DISINTEGRATING ORAL
Status: COMPLETED | OUTPATIENT
Start: 2021-07-19 | End: 2021-07-19

## 2021-07-19 RX ORDER — LORATADINE 10 MG/1
10 TABLET ORAL DAILY
Qty: 20 TABLET | Refills: 0 | Status: SHIPPED | OUTPATIENT
Start: 2021-07-19

## 2021-07-19 RX ORDER — ONDANSETRON 4 MG/1
4 TABLET, ORALLY DISINTEGRATING ORAL EVERY 6 HOURS PRN
Qty: 20 TABLET | Refills: 0 | Status: SHIPPED | OUTPATIENT
Start: 2021-07-19 | End: 2021-12-01

## 2021-07-19 RX ORDER — GUAIFENESIN/DEXTROMETHORPHAN 100-10MG/5
5 SYRUP ORAL EVERY 4 HOURS PRN
Qty: 120 ML | Refills: 0 | COMMUNITY
Start: 2021-07-19

## 2021-07-19 RX ADMIN — DEXAMETHASONE SODIUM PHOSPHATE 4 MG: 4 INJECTION, SOLUTION INTRA-ARTICULAR; INTRALESIONAL; INTRAMUSCULAR; INTRAVENOUS; SOFT TISSUE at 07:07

## 2021-07-19 RX ADMIN — ONDANSETRON 4 MG: 4 TABLET, ORALLY DISINTEGRATING ORAL at 07:07

## 2021-07-27 ENCOUNTER — HOSPITAL ENCOUNTER (EMERGENCY)
Facility: HOSPITAL | Age: 56
Discharge: HOME | End: 2021-07-27
Attending: EMERGENCY MEDICINE
Payer: MEDICAID

## 2021-07-27 VITALS
OXYGEN SATURATION: 100 % | HEART RATE: 86 BPM | BODY MASS INDEX: 26.32 KG/M2 | HEIGHT: 71 IN | TEMPERATURE: 99 F | RESPIRATION RATE: 16 BRPM | DIASTOLIC BLOOD PRESSURE: 86 MMHG | SYSTOLIC BLOOD PRESSURE: 131 MMHG | WEIGHT: 188 LBS

## 2021-07-27 DIAGNOSIS — R53.1 GENERALIZED WEAKNESS: Primary | ICD-10-CM

## 2021-07-27 DIAGNOSIS — R11.0 NAUSEA: ICD-10-CM

## 2021-07-27 LAB
ALBUMIN SERPL BCP-MCNC: 3.7 G/DL (ref 3.5–5.2)
ALP SERPL-CCNC: 161 U/L (ref 55–135)
ALT SERPL W/O P-5'-P-CCNC: 31 U/L (ref 10–44)
ANION GAP SERPL CALC-SCNC: 9 MMOL/L (ref 8–16)
AST SERPL-CCNC: 16 U/L (ref 10–40)
BASOPHILS # BLD AUTO: 0.02 K/UL (ref 0–0.2)
BASOPHILS NFR BLD: 0.2 % (ref 0–1.9)
BILIRUB SERPL-MCNC: 0.1 MG/DL (ref 0.1–1)
BILIRUB UR QL STRIP: NEGATIVE
BUN SERPL-MCNC: 14 MG/DL (ref 6–20)
CALCIUM SERPL-MCNC: 8.6 MG/DL (ref 8.7–10.5)
CHLORIDE SERPL-SCNC: 107 MMOL/L (ref 95–110)
CLARITY UR: CLEAR
CO2 SERPL-SCNC: 28 MMOL/L (ref 23–29)
COLOR UR: YELLOW
CREAT SERPL-MCNC: 0.7 MG/DL (ref 0.5–1.4)
DIFFERENTIAL METHOD: ABNORMAL
EOSINOPHIL # BLD AUTO: 0.3 K/UL (ref 0–0.5)
EOSINOPHIL NFR BLD: 3.8 % (ref 0–8)
ERYTHROCYTE [DISTWIDTH] IN BLOOD BY AUTOMATED COUNT: 12.2 % (ref 11.5–14.5)
EST. GFR  (AFRICAN AMERICAN): >60 ML/MIN/1.73 M^2
EST. GFR  (NON AFRICAN AMERICAN): >60 ML/MIN/1.73 M^2
GLUCOSE SERPL-MCNC: 118 MG/DL (ref 70–110)
GLUCOSE UR QL STRIP: NEGATIVE
HCT VFR BLD AUTO: 37.1 % (ref 40–54)
HGB BLD-MCNC: 12.3 G/DL (ref 14–18)
HGB UR QL STRIP: NEGATIVE
IMM GRANULOCYTES # BLD AUTO: 0.02 K/UL (ref 0–0.04)
IMM GRANULOCYTES NFR BLD AUTO: 0.2 % (ref 0–0.5)
KETONES UR QL STRIP: NEGATIVE
LEUKOCYTE ESTERASE UR QL STRIP: NEGATIVE
LIPASE SERPL-CCNC: 55 U/L (ref 23–300)
LYMPHOCYTES # BLD AUTO: 1.9 K/UL (ref 1–4.8)
LYMPHOCYTES NFR BLD: 21.8 % (ref 18–48)
MCH RBC QN AUTO: 29.6 PG (ref 27–31)
MCHC RBC AUTO-ENTMCNC: 33.2 G/DL (ref 32–36)
MCV RBC AUTO: 89 FL (ref 82–98)
MONOCYTES # BLD AUTO: 0.5 K/UL (ref 0.3–1)
MONOCYTES NFR BLD: 6 % (ref 4–15)
NEUTROPHILS # BLD AUTO: 5.8 K/UL (ref 1.8–7.7)
NEUTROPHILS NFR BLD: 68 % (ref 38–73)
NITRITE UR QL STRIP: NEGATIVE
NRBC BLD-RTO: 0 /100 WBC
PH UR STRIP: 6 [PH] (ref 5–8)
PLATELET # BLD AUTO: 215 K/UL (ref 150–450)
PMV BLD AUTO: 9.1 FL (ref 9.2–12.9)
POTASSIUM SERPL-SCNC: 4 MMOL/L (ref 3.5–5.1)
PROT SERPL-MCNC: 7.5 G/DL (ref 6–8.4)
PROT UR QL STRIP: NEGATIVE
RBC # BLD AUTO: 4.15 M/UL (ref 4.6–6.2)
SODIUM SERPL-SCNC: 144 MMOL/L (ref 136–145)
SP GR UR STRIP: 1.01 (ref 1–1.03)
URN SPEC COLLECT METH UR: NORMAL
UROBILINOGEN UR STRIP-ACNC: NEGATIVE EU/DL
WBC # BLD AUTO: 8.52 K/UL (ref 3.9–12.7)

## 2021-07-27 PROCEDURE — 96375 TX/PRO/DX INJ NEW DRUG ADDON: CPT

## 2021-07-27 PROCEDURE — 85025 COMPLETE CBC W/AUTO DIFF WBC: CPT | Performed by: EMERGENCY MEDICINE

## 2021-07-27 PROCEDURE — 83690 ASSAY OF LIPASE: CPT | Performed by: EMERGENCY MEDICINE

## 2021-07-27 PROCEDURE — 96365 THER/PROPH/DIAG IV INF INIT: CPT

## 2021-07-27 PROCEDURE — 81003 URINALYSIS AUTO W/O SCOPE: CPT | Performed by: EMERGENCY MEDICINE

## 2021-07-27 PROCEDURE — 36415 COLL VENOUS BLD VENIPUNCTURE: CPT | Performed by: EMERGENCY MEDICINE

## 2021-07-27 PROCEDURE — 63600175 PHARM REV CODE 636 W HCPCS: Performed by: EMERGENCY MEDICINE

## 2021-07-27 PROCEDURE — 25000003 PHARM REV CODE 250: Performed by: EMERGENCY MEDICINE

## 2021-07-27 PROCEDURE — 99284 EMERGENCY DEPT VISIT MOD MDM: CPT | Mod: 25

## 2021-07-27 PROCEDURE — 80053 COMPREHEN METABOLIC PANEL: CPT | Performed by: EMERGENCY MEDICINE

## 2021-07-27 RX ORDER — PROMETHAZINE HYDROCHLORIDE 25 MG/1
25 TABLET ORAL EVERY 6 HOURS PRN
Qty: 20 TABLET | Refills: 0 | Status: SHIPPED | OUTPATIENT
Start: 2021-07-27 | End: 2021-12-01

## 2021-07-27 RX ORDER — DICYCLOMINE HYDROCHLORIDE 20 MG/1
20 TABLET ORAL 3 TIMES DAILY PRN
Qty: 30 TABLET | Refills: 0 | Status: SHIPPED | OUTPATIENT
Start: 2021-07-27 | End: 2021-08-26

## 2021-07-27 RX ORDER — ONDANSETRON 2 MG/ML
4 INJECTION INTRAMUSCULAR; INTRAVENOUS
Status: COMPLETED | OUTPATIENT
Start: 2021-07-27 | End: 2021-07-27

## 2021-07-27 RX ADMIN — PROMETHAZINE HYDROCHLORIDE 12.5 MG: 25 INJECTION INTRAMUSCULAR; INTRAVENOUS at 06:07

## 2021-07-27 RX ADMIN — SODIUM CHLORIDE 1000 ML: 900 INJECTION, SOLUTION INTRAVENOUS at 06:07

## 2021-07-27 RX ADMIN — ONDANSETRON 4 MG: 2 INJECTION INTRAMUSCULAR; INTRAVENOUS at 06:07

## 2021-08-17 ENCOUNTER — HOSPITAL ENCOUNTER (EMERGENCY)
Facility: HOSPITAL | Age: 56
Discharge: HOME | End: 2021-08-17
Attending: EMERGENCY MEDICINE
Payer: MEDICAID

## 2021-08-17 VITALS
SYSTOLIC BLOOD PRESSURE: 123 MMHG | HEART RATE: 77 BPM | RESPIRATION RATE: 18 BRPM | BODY MASS INDEX: 26.78 KG/M2 | WEIGHT: 192 LBS | OXYGEN SATURATION: 99 % | DIASTOLIC BLOOD PRESSURE: 87 MMHG | TEMPERATURE: 98 F

## 2021-08-17 DIAGNOSIS — Z20.822 COVID-19 RULED OUT: Primary | ICD-10-CM

## 2021-08-17 LAB
CTP QC/QA: YES
SARS-COV-2 RDRP RESP QL NAA+PROBE: NEGATIVE

## 2021-08-17 PROCEDURE — U0002 COVID-19 LAB TEST NON-CDC: HCPCS | Performed by: CLINICAL NURSE SPECIALIST

## 2021-08-17 PROCEDURE — 99282 EMERGENCY DEPT VISIT SF MDM: CPT

## 2021-08-20 ENCOUNTER — HOSPITAL ENCOUNTER (EMERGENCY)
Facility: HOSPITAL | Age: 56
Discharge: HOME | End: 2021-08-20
Attending: FAMILY MEDICINE
Payer: MEDICAID

## 2021-08-20 VITALS
BODY MASS INDEX: 25.76 KG/M2 | SYSTOLIC BLOOD PRESSURE: 141 MMHG | RESPIRATION RATE: 18 BRPM | WEIGHT: 184 LBS | HEIGHT: 71 IN | TEMPERATURE: 98 F | DIASTOLIC BLOOD PRESSURE: 93 MMHG | OXYGEN SATURATION: 98 % | HEART RATE: 90 BPM

## 2021-08-20 DIAGNOSIS — U07.1 COVID-19: Primary | ICD-10-CM

## 2021-08-20 LAB
CTP QC/QA: YES
SARS-COV-2 RDRP RESP QL NAA+PROBE: POSITIVE

## 2021-08-20 PROCEDURE — 99284 EMERGENCY DEPT VISIT MOD MDM: CPT

## 2021-08-20 PROCEDURE — U0002 COVID-19 LAB TEST NON-CDC: HCPCS | Performed by: CLINICAL NURSE SPECIALIST

## 2021-08-20 RX ORDER — ALBUTEROL SULFATE 90 UG/1
1-2 AEROSOL, METERED RESPIRATORY (INHALATION) EVERY 6 HOURS PRN
Qty: 18 G | Refills: 0 | Status: SHIPPED | OUTPATIENT
Start: 2021-08-20 | End: 2022-08-20

## 2021-08-20 RX ORDER — AZITHROMYCIN 250 MG/1
250 TABLET, FILM COATED ORAL DAILY
Qty: 6 TABLET | Refills: 0 | Status: SHIPPED | OUTPATIENT
Start: 2021-08-20

## 2021-08-20 RX ORDER — BENZONATATE 100 MG/1
100 CAPSULE ORAL 3 TIMES DAILY PRN
Qty: 20 CAPSULE | Refills: 0 | Status: SHIPPED | OUTPATIENT
Start: 2021-08-20 | End: 2021-08-30

## 2021-08-20 RX ORDER — ONDANSETRON 4 MG/1
4 TABLET, FILM COATED ORAL EVERY 6 HOURS
Qty: 12 TABLET | Refills: 0 | OUTPATIENT
Start: 2021-08-20 | End: 2021-09-05

## 2021-09-05 ENCOUNTER — HOSPITAL ENCOUNTER (EMERGENCY)
Facility: HOSPITAL | Age: 56
Discharge: HOME | End: 2021-09-05
Attending: STUDENT IN AN ORGANIZED HEALTH CARE EDUCATION/TRAINING PROGRAM
Payer: MEDICAID

## 2021-09-05 VITALS
RESPIRATION RATE: 18 BRPM | HEIGHT: 71 IN | OXYGEN SATURATION: 99 % | DIASTOLIC BLOOD PRESSURE: 63 MMHG | WEIGHT: 198.44 LBS | HEART RATE: 70 BPM | TEMPERATURE: 98 F | SYSTOLIC BLOOD PRESSURE: 120 MMHG | BODY MASS INDEX: 27.78 KG/M2

## 2021-09-05 DIAGNOSIS — R07.9 CHEST PAIN, UNSPECIFIED TYPE: Primary | ICD-10-CM

## 2021-09-05 DIAGNOSIS — R07.9 CHEST PAIN: ICD-10-CM

## 2021-09-05 LAB
ALBUMIN SERPL BCP-MCNC: 3.9 G/DL (ref 3.5–5.2)
ALP SERPL-CCNC: 148 U/L (ref 55–135)
ALT SERPL W/O P-5'-P-CCNC: 36 U/L (ref 10–44)
ANION GAP SERPL CALC-SCNC: 5 MMOL/L (ref 8–16)
AST SERPL-CCNC: 15 U/L (ref 10–40)
BASOPHILS # BLD AUTO: 0.03 K/UL (ref 0–0.2)
BASOPHILS NFR BLD: 0.5 % (ref 0–1.9)
BILIRUB SERPL-MCNC: 0.2 MG/DL (ref 0.1–1)
BUN SERPL-MCNC: 9 MG/DL (ref 6–20)
CALCIUM SERPL-MCNC: 8.5 MG/DL (ref 8.7–10.5)
CHLORIDE SERPL-SCNC: 107 MMOL/L (ref 95–110)
CO2 SERPL-SCNC: 30 MMOL/L (ref 23–29)
CREAT SERPL-MCNC: 0.5 MG/DL (ref 0.5–1.4)
DIFFERENTIAL METHOD: ABNORMAL
EOSINOPHIL # BLD AUTO: 0.3 K/UL (ref 0–0.5)
EOSINOPHIL NFR BLD: 4.2 % (ref 0–8)
ERYTHROCYTE [DISTWIDTH] IN BLOOD BY AUTOMATED COUNT: 12.1 % (ref 11.5–14.5)
EST. GFR  (AFRICAN AMERICAN): >60 ML/MIN/1.73 M^2
EST. GFR  (NON AFRICAN AMERICAN): >60 ML/MIN/1.73 M^2
GLUCOSE SERPL-MCNC: 130 MG/DL (ref 70–110)
HCT VFR BLD AUTO: 39.8 % (ref 40–54)
HGB BLD-MCNC: 13.2 G/DL (ref 14–18)
IMM GRANULOCYTES # BLD AUTO: 0.01 K/UL (ref 0–0.04)
IMM GRANULOCYTES NFR BLD AUTO: 0.2 % (ref 0–0.5)
LYMPHOCYTES # BLD AUTO: 1.6 K/UL (ref 1–4.8)
LYMPHOCYTES NFR BLD: 25.8 % (ref 18–48)
MCH RBC QN AUTO: 30.3 PG (ref 27–31)
MCHC RBC AUTO-ENTMCNC: 33.2 G/DL (ref 32–36)
MCV RBC AUTO: 92 FL (ref 82–98)
MONOCYTES # BLD AUTO: 0.4 K/UL (ref 0.3–1)
MONOCYTES NFR BLD: 6.1 % (ref 4–15)
NEUTROPHILS # BLD AUTO: 4 K/UL (ref 1.8–7.7)
NEUTROPHILS NFR BLD: 63.2 % (ref 38–73)
NRBC BLD-RTO: 0 /100 WBC
NT-PROBNP SERPL-MCNC: 81 PG/ML (ref 5–900)
PLATELET # BLD AUTO: 255 K/UL (ref 150–450)
PMV BLD AUTO: 9.2 FL (ref 9.2–12.9)
POTASSIUM SERPL-SCNC: 4.1 MMOL/L (ref 3.5–5.1)
PROT SERPL-MCNC: 7.7 G/DL (ref 6–8.4)
RBC # BLD AUTO: 4.35 M/UL (ref 4.6–6.2)
SODIUM SERPL-SCNC: 142 MMOL/L (ref 136–145)
TROPONIN I SERPL DL<=0.01 NG/ML-MCNC: <0.02 NG/ML (ref 0–0.03)
WBC # BLD AUTO: 6.25 K/UL (ref 3.9–12.7)

## 2021-09-05 PROCEDURE — 93005 ELECTROCARDIOGRAM TRACING: CPT

## 2021-09-05 PROCEDURE — 93010 ELECTROCARDIOGRAM REPORT: CPT | Mod: ,,, | Performed by: INTERNAL MEDICINE

## 2021-09-05 PROCEDURE — 83880 ASSAY OF NATRIURETIC PEPTIDE: CPT | Performed by: STUDENT IN AN ORGANIZED HEALTH CARE EDUCATION/TRAINING PROGRAM

## 2021-09-05 PROCEDURE — 84484 ASSAY OF TROPONIN QUANT: CPT | Performed by: STUDENT IN AN ORGANIZED HEALTH CARE EDUCATION/TRAINING PROGRAM

## 2021-09-05 PROCEDURE — 93010 EKG 12-LEAD: ICD-10-PCS | Mod: ,,, | Performed by: INTERNAL MEDICINE

## 2021-09-05 PROCEDURE — 80053 COMPREHEN METABOLIC PANEL: CPT | Performed by: STUDENT IN AN ORGANIZED HEALTH CARE EDUCATION/TRAINING PROGRAM

## 2021-09-05 PROCEDURE — 63600175 PHARM REV CODE 636 W HCPCS: Performed by: STUDENT IN AN ORGANIZED HEALTH CARE EDUCATION/TRAINING PROGRAM

## 2021-09-05 PROCEDURE — 85025 COMPLETE CBC W/AUTO DIFF WBC: CPT | Performed by: STUDENT IN AN ORGANIZED HEALTH CARE EDUCATION/TRAINING PROGRAM

## 2021-09-05 PROCEDURE — 99285 EMERGENCY DEPT VISIT HI MDM: CPT | Mod: 25

## 2021-09-05 PROCEDURE — 96374 THER/PROPH/DIAG INJ IV PUSH: CPT

## 2021-09-05 PROCEDURE — 25000003 PHARM REV CODE 250: Performed by: STUDENT IN AN ORGANIZED HEALTH CARE EDUCATION/TRAINING PROGRAM

## 2021-09-05 RX ORDER — ONDANSETRON 4 MG/1
4 TABLET, FILM COATED ORAL EVERY 6 HOURS
Qty: 12 TABLET | Refills: 0 | Status: SHIPPED | OUTPATIENT
Start: 2021-09-05 | End: 2022-06-21 | Stop reason: SDUPTHER

## 2021-09-05 RX ORDER — ONDANSETRON 4 MG/1
4 TABLET, FILM COATED ORAL EVERY 6 HOURS
Qty: 12 TABLET | Refills: 0 | Status: SHIPPED | OUTPATIENT
Start: 2021-09-05 | End: 2021-09-05 | Stop reason: SDUPTHER

## 2021-09-05 RX ORDER — FAMOTIDINE 20 MG/1
20 TABLET, FILM COATED ORAL 2 TIMES DAILY
Qty: 20 TABLET | Refills: 0 | Status: SHIPPED | OUTPATIENT
Start: 2021-09-05 | End: 2021-09-05 | Stop reason: SDUPTHER

## 2021-09-05 RX ORDER — FAMOTIDINE 20 MG/1
20 TABLET, FILM COATED ORAL 2 TIMES DAILY
Qty: 20 TABLET | Refills: 0 | OUTPATIENT
Start: 2021-09-05 | End: 2022-11-21

## 2021-09-05 RX ORDER — ASPIRIN 325 MG
325 TABLET ORAL
Status: COMPLETED | OUTPATIENT
Start: 2021-09-05 | End: 2021-09-05

## 2021-09-05 RX ORDER — MAG HYDROX/ALUMINUM HYD/SIMETH 200-200-20
5 SUSPENSION, ORAL (FINAL DOSE FORM) ORAL
Status: COMPLETED | OUTPATIENT
Start: 2021-09-05 | End: 2021-09-05

## 2021-09-05 RX ORDER — ONDANSETRON 2 MG/ML
4 INJECTION INTRAMUSCULAR; INTRAVENOUS
Status: COMPLETED | OUTPATIENT
Start: 2021-09-05 | End: 2021-09-05

## 2021-09-05 RX ADMIN — ALUMINUM HYDROXIDE, MAGNESIUM HYDROXIDE, AND SIMETHICONE 5 ML: 200; 200; 20 SUSPENSION ORAL at 08:09

## 2021-09-05 RX ADMIN — ASPIRIN 325 MG ORAL TABLET 325 MG: 325 PILL ORAL at 08:09

## 2021-09-05 RX ADMIN — ONDANSETRON 4 MG: 2 INJECTION INTRAMUSCULAR; INTRAVENOUS at 08:09

## 2021-11-01 ENCOUNTER — OFFICE VISIT (OUTPATIENT)
Dept: SURGERY | Facility: CLINIC | Age: 56
End: 2021-11-01
Payer: MEDICAID

## 2021-11-01 VITALS
HEIGHT: 71 IN | BODY MASS INDEX: 24.6 KG/M2 | WEIGHT: 175.69 LBS | DIASTOLIC BLOOD PRESSURE: 75 MMHG | SYSTOLIC BLOOD PRESSURE: 130 MMHG

## 2021-11-01 DIAGNOSIS — K21.9 GASTROESOPHAGEAL REFLUX DISEASE WITHOUT ESOPHAGITIS: ICD-10-CM

## 2021-11-01 DIAGNOSIS — K42.9 UMBILICAL HERNIA WITHOUT OBSTRUCTION AND WITHOUT GANGRENE: Primary | ICD-10-CM

## 2021-11-01 PROCEDURE — 99999 PR PBB SHADOW E&M-EST. PATIENT-LVL III: CPT | Mod: PBBFAC,,, | Performed by: STUDENT IN AN ORGANIZED HEALTH CARE EDUCATION/TRAINING PROGRAM

## 2021-11-01 PROCEDURE — 99203 OFFICE O/P NEW LOW 30 MIN: CPT | Mod: S$PBB,,, | Performed by: STUDENT IN AN ORGANIZED HEALTH CARE EDUCATION/TRAINING PROGRAM

## 2021-11-01 PROCEDURE — 99203 PR OFFICE/OUTPT VISIT, NEW, LEVL III, 30-44 MIN: ICD-10-PCS | Mod: S$PBB,,, | Performed by: STUDENT IN AN ORGANIZED HEALTH CARE EDUCATION/TRAINING PROGRAM

## 2021-11-01 PROCEDURE — 99999 PR PBB SHADOW E&M-EST. PATIENT-LVL III: ICD-10-PCS | Mod: PBBFAC,,, | Performed by: STUDENT IN AN ORGANIZED HEALTH CARE EDUCATION/TRAINING PROGRAM

## 2021-11-01 PROCEDURE — 99213 OFFICE O/P EST LOW 20 MIN: CPT | Mod: PBBFAC | Performed by: STUDENT IN AN ORGANIZED HEALTH CARE EDUCATION/TRAINING PROGRAM

## 2021-11-01 RX ORDER — SUCRALFATE 1 G/1
1 TABLET ORAL 4 TIMES DAILY
Qty: 120 TABLET | Refills: 2 | Status: SHIPPED | OUTPATIENT
Start: 2021-11-01 | End: 2022-01-30

## 2021-11-05 ENCOUNTER — HOSPITAL ENCOUNTER (EMERGENCY)
Facility: HOSPITAL | Age: 56
Discharge: HOME | End: 2021-11-05
Attending: EMERGENCY MEDICINE
Payer: MEDICAID

## 2021-11-05 VITALS
DIASTOLIC BLOOD PRESSURE: 95 MMHG | HEART RATE: 70 BPM | BODY MASS INDEX: 26.32 KG/M2 | HEIGHT: 71 IN | OXYGEN SATURATION: 97 % | TEMPERATURE: 98 F | SYSTOLIC BLOOD PRESSURE: 143 MMHG | WEIGHT: 188 LBS | RESPIRATION RATE: 16 BRPM

## 2021-11-05 DIAGNOSIS — M25.532 ACUTE PAIN OF LEFT WRIST: Primary | ICD-10-CM

## 2021-11-05 LAB — GROUP A STREP, MOLECULAR: NEGATIVE

## 2021-11-05 PROCEDURE — 96372 THER/PROPH/DIAG INJ SC/IM: CPT

## 2021-11-05 PROCEDURE — 87651 STREP A DNA AMP PROBE: CPT | Performed by: NURSE PRACTITIONER

## 2021-11-05 PROCEDURE — 99284 EMERGENCY DEPT VISIT MOD MDM: CPT | Mod: 25

## 2021-11-05 PROCEDURE — 63600175 PHARM REV CODE 636 W HCPCS: Performed by: NURSE PRACTITIONER

## 2021-11-05 RX ORDER — DEXAMETHASONE SODIUM PHOSPHATE 4 MG/ML
8 INJECTION, SOLUTION INTRA-ARTICULAR; INTRALESIONAL; INTRAMUSCULAR; INTRAVENOUS; SOFT TISSUE
Status: COMPLETED | OUTPATIENT
Start: 2021-11-05 | End: 2021-11-05

## 2021-11-05 RX ADMIN — DEXAMETHASONE SODIUM PHOSPHATE 8 MG: 4 INJECTION, SOLUTION INTRA-ARTICULAR; INTRALESIONAL; INTRAMUSCULAR; INTRAVENOUS; SOFT TISSUE at 01:11

## 2021-11-09 PROBLEM — K42.9 UMBILICAL HERNIA WITHOUT OBSTRUCTION AND WITHOUT GANGRENE: Status: ACTIVE | Noted: 2021-11-09

## 2021-11-09 PROBLEM — K21.9 GERD (GASTROESOPHAGEAL REFLUX DISEASE): Status: ACTIVE | Noted: 2021-11-09

## 2021-11-09 PROBLEM — R56.9 SEIZURES: Status: ACTIVE | Noted: 2021-11-09

## 2021-11-09 PROBLEM — S06.9XAA TRAUMATIC BRAIN INJURY: Status: ACTIVE | Noted: 2021-11-09

## 2021-11-11 ENCOUNTER — HOSPITAL ENCOUNTER (EMERGENCY)
Facility: HOSPITAL | Age: 56
Discharge: HOME | End: 2021-11-11
Attending: EMERGENCY MEDICINE
Payer: MEDICAID

## 2021-11-11 VITALS
OXYGEN SATURATION: 97 % | WEIGHT: 189.63 LBS | DIASTOLIC BLOOD PRESSURE: 75 MMHG | SYSTOLIC BLOOD PRESSURE: 115 MMHG | HEIGHT: 71 IN | BODY MASS INDEX: 26.55 KG/M2 | RESPIRATION RATE: 18 BRPM | TEMPERATURE: 99 F | HEART RATE: 76 BPM

## 2021-11-11 DIAGNOSIS — M62.838 MUSCLE SPASM: Primary | ICD-10-CM

## 2021-11-11 PROCEDURE — 63600175 PHARM REV CODE 636 W HCPCS: Performed by: CLINICAL NURSE SPECIALIST

## 2021-11-11 PROCEDURE — 96372 THER/PROPH/DIAG INJ SC/IM: CPT

## 2021-11-11 PROCEDURE — 99284 EMERGENCY DEPT VISIT MOD MDM: CPT | Mod: 25

## 2021-11-11 RX ORDER — KETOROLAC TROMETHAMINE 30 MG/ML
30 INJECTION, SOLUTION INTRAMUSCULAR; INTRAVENOUS
Status: COMPLETED | OUTPATIENT
Start: 2021-11-11 | End: 2021-11-11

## 2021-11-11 RX ORDER — DEXAMETHASONE SODIUM PHOSPHATE 4 MG/ML
4 INJECTION, SOLUTION INTRA-ARTICULAR; INTRALESIONAL; INTRAMUSCULAR; INTRAVENOUS; SOFT TISSUE
Status: COMPLETED | OUTPATIENT
Start: 2021-11-11 | End: 2021-11-11

## 2021-11-11 RX ADMIN — DEXAMETHASONE SODIUM PHOSPHATE 4 MG: 4 INJECTION, SOLUTION INTRA-ARTICULAR; INTRALESIONAL; INTRAMUSCULAR; INTRAVENOUS; SOFT TISSUE at 10:11

## 2021-11-11 RX ADMIN — KETOROLAC TROMETHAMINE 30 MG: 30 INJECTION, SOLUTION INTRAMUSCULAR; INTRAVENOUS at 10:11

## 2021-11-15 ENCOUNTER — HOSPITAL ENCOUNTER (EMERGENCY)
Facility: HOSPITAL | Age: 56
Discharge: HOME | End: 2021-11-15
Attending: EMERGENCY MEDICINE
Payer: MEDICAID

## 2021-11-15 VITALS
WEIGHT: 190 LBS | OXYGEN SATURATION: 98 % | SYSTOLIC BLOOD PRESSURE: 150 MMHG | TEMPERATURE: 98 F | RESPIRATION RATE: 18 BRPM | BODY MASS INDEX: 26.5 KG/M2 | DIASTOLIC BLOOD PRESSURE: 79 MMHG | HEART RATE: 140 BPM

## 2021-11-15 DIAGNOSIS — M62.830 MUSCLE SPASM OF BACK: Primary | ICD-10-CM

## 2021-11-15 PROCEDURE — 96372 THER/PROPH/DIAG INJ SC/IM: CPT

## 2021-11-15 PROCEDURE — 99284 EMERGENCY DEPT VISIT MOD MDM: CPT | Mod: 25

## 2021-11-15 PROCEDURE — 63600175 PHARM REV CODE 636 W HCPCS: Performed by: EMERGENCY MEDICINE

## 2021-11-15 RX ORDER — ORPHENADRINE CITRATE 30 MG/ML
60 INJECTION INTRAMUSCULAR; INTRAVENOUS
Status: COMPLETED | OUTPATIENT
Start: 2021-11-15 | End: 2021-11-15

## 2021-11-15 RX ADMIN — ORPHENADRINE CITRATE 60 MG: 30 INJECTION INTRAMUSCULAR; INTRAVENOUS at 07:11

## 2021-11-19 ENCOUNTER — PATIENT OUTREACH (OUTPATIENT)
Dept: EMERGENCY MEDICINE | Facility: HOSPITAL | Age: 56
End: 2021-11-19
Payer: MEDICAID

## 2021-11-22 ENCOUNTER — HOSPITAL ENCOUNTER (OUTPATIENT)
Dept: RADIOLOGY | Facility: HOSPITAL | Age: 56
Discharge: HOME | End: 2021-11-22
Attending: STUDENT IN AN ORGANIZED HEALTH CARE EDUCATION/TRAINING PROGRAM
Payer: MEDICAID

## 2021-11-22 DIAGNOSIS — K42.9 UMBILICAL HERNIA WITHOUT OBSTRUCTION AND WITHOUT GANGRENE: ICD-10-CM

## 2021-11-22 PROCEDURE — 76705 ECHO EXAM OF ABDOMEN: CPT | Mod: TC

## 2021-12-01 ENCOUNTER — HOSPITAL ENCOUNTER (EMERGENCY)
Facility: HOSPITAL | Age: 56
Discharge: HOME | End: 2021-12-01
Attending: STUDENT IN AN ORGANIZED HEALTH CARE EDUCATION/TRAINING PROGRAM
Payer: MEDICAID

## 2021-12-01 VITALS
HEART RATE: 84 BPM | TEMPERATURE: 98 F | SYSTOLIC BLOOD PRESSURE: 145 MMHG | DIASTOLIC BLOOD PRESSURE: 96 MMHG | WEIGHT: 184 LBS | OXYGEN SATURATION: 97 % | RESPIRATION RATE: 18 BRPM | BODY MASS INDEX: 25.66 KG/M2

## 2021-12-01 DIAGNOSIS — J06.9 VIRAL URI WITH COUGH: Primary | ICD-10-CM

## 2021-12-01 PROCEDURE — 99284 EMERGENCY DEPT VISIT MOD MDM: CPT

## 2021-12-01 RX ORDER — PROMETHAZINE HYDROCHLORIDE AND DEXTROMETHORPHAN HYDROBROMIDE 6.25; 15 MG/5ML; MG/5ML
5 SYRUP ORAL EVERY 4 HOURS PRN
Qty: 118 ML | Refills: 0 | Status: SHIPPED | OUTPATIENT
Start: 2021-12-01 | End: 2021-12-11

## 2021-12-01 RX ORDER — FLUTICASONE PROPIONATE 50 MCG
1 SPRAY, SUSPENSION (ML) NASAL 2 TIMES DAILY PRN
Qty: 15 G | Refills: 0 | OUTPATIENT
Start: 2021-12-01 | End: 2023-01-21

## 2021-12-02 ENCOUNTER — OFFICE VISIT (OUTPATIENT)
Dept: SURGERY | Facility: CLINIC | Age: 56
End: 2021-12-02
Payer: MEDICAID

## 2021-12-02 VITALS
OXYGEN SATURATION: 96 % | HEART RATE: 75 BPM | SYSTOLIC BLOOD PRESSURE: 153 MMHG | HEIGHT: 71 IN | BODY MASS INDEX: 25.6 KG/M2 | WEIGHT: 182.88 LBS | DIASTOLIC BLOOD PRESSURE: 87 MMHG

## 2021-12-02 DIAGNOSIS — K21.9 GASTROESOPHAGEAL REFLUX DISEASE WITHOUT ESOPHAGITIS: Primary | ICD-10-CM

## 2021-12-02 DIAGNOSIS — K42.9 UMBILICAL HERNIA WITHOUT OBSTRUCTION AND WITHOUT GANGRENE: ICD-10-CM

## 2021-12-02 PROCEDURE — 99213 PR OFFICE/OUTPT VISIT, EST, LEVL III, 20-29 MIN: ICD-10-PCS | Mod: S$PBB,,, | Performed by: STUDENT IN AN ORGANIZED HEALTH CARE EDUCATION/TRAINING PROGRAM

## 2021-12-02 PROCEDURE — 99999 PR PBB SHADOW E&M-EST. PATIENT-LVL IV: CPT | Mod: PBBFAC,,, | Performed by: STUDENT IN AN ORGANIZED HEALTH CARE EDUCATION/TRAINING PROGRAM

## 2021-12-02 PROCEDURE — 99214 OFFICE O/P EST MOD 30 MIN: CPT | Mod: PBBFAC | Performed by: STUDENT IN AN ORGANIZED HEALTH CARE EDUCATION/TRAINING PROGRAM

## 2021-12-02 PROCEDURE — 99999 PR PBB SHADOW E&M-EST. PATIENT-LVL IV: ICD-10-PCS | Mod: PBBFAC,,, | Performed by: STUDENT IN AN ORGANIZED HEALTH CARE EDUCATION/TRAINING PROGRAM

## 2021-12-02 PROCEDURE — 99213 OFFICE O/P EST LOW 20 MIN: CPT | Mod: S$PBB,,, | Performed by: STUDENT IN AN ORGANIZED HEALTH CARE EDUCATION/TRAINING PROGRAM

## 2021-12-09 ENCOUNTER — HOSPITAL ENCOUNTER (EMERGENCY)
Facility: HOSPITAL | Age: 56
Discharge: HOME | End: 2021-12-09
Attending: EMERGENCY MEDICINE
Payer: MEDICAID

## 2021-12-09 VITALS
TEMPERATURE: 98 F | RESPIRATION RATE: 16 BRPM | SYSTOLIC BLOOD PRESSURE: 116 MMHG | BODY MASS INDEX: 25.76 KG/M2 | DIASTOLIC BLOOD PRESSURE: 88 MMHG | HEIGHT: 71 IN | HEART RATE: 85 BPM | WEIGHT: 184 LBS | OXYGEN SATURATION: 98 %

## 2021-12-09 DIAGNOSIS — R07.9 CHEST PAIN: ICD-10-CM

## 2021-12-09 LAB
ALBUMIN SERPL BCP-MCNC: 3.4 G/DL (ref 3.5–5.2)
ALP SERPL-CCNC: 145 U/L (ref 55–135)
ALT SERPL W/O P-5'-P-CCNC: 30 U/L (ref 10–44)
ANION GAP SERPL CALC-SCNC: 6 MMOL/L (ref 8–16)
AST SERPL-CCNC: 12 U/L (ref 10–40)
BASOPHILS # BLD AUTO: 0.01 K/UL (ref 0–0.2)
BASOPHILS NFR BLD: 0.1 % (ref 0–1.9)
BILIRUB SERPL-MCNC: 0.1 MG/DL (ref 0.1–1)
BUN SERPL-MCNC: 9 MG/DL (ref 6–20)
CALCIUM SERPL-MCNC: 8.4 MG/DL (ref 8.7–10.5)
CHLORIDE SERPL-SCNC: 106 MMOL/L (ref 95–110)
CO2 SERPL-SCNC: 29 MMOL/L (ref 23–29)
CREAT SERPL-MCNC: 0.6 MG/DL (ref 0.5–1.4)
DIFFERENTIAL METHOD: ABNORMAL
EOSINOPHIL # BLD AUTO: 0.2 K/UL (ref 0–0.5)
EOSINOPHIL NFR BLD: 2.5 % (ref 0–8)
ERYTHROCYTE [DISTWIDTH] IN BLOOD BY AUTOMATED COUNT: 11.6 % (ref 11.5–14.5)
EST. GFR  (AFRICAN AMERICAN): >60 ML/MIN/1.73 M^2
EST. GFR  (NON AFRICAN AMERICAN): >60 ML/MIN/1.73 M^2
ETHANOL SERPL-MCNC: <3 MG/DL
GLUCOSE SERPL-MCNC: 120 MG/DL (ref 70–110)
HCT VFR BLD AUTO: 36.5 % (ref 40–54)
HGB BLD-MCNC: 12.2 G/DL (ref 14–18)
IMM GRANULOCYTES # BLD AUTO: 0.03 K/UL (ref 0–0.04)
IMM GRANULOCYTES NFR BLD AUTO: 0.3 % (ref 0–0.5)
LIPASE SERPL-CCNC: 69 U/L (ref 23–300)
LYMPHOCYTES # BLD AUTO: 2.1 K/UL (ref 1–4.8)
LYMPHOCYTES NFR BLD: 23.6 % (ref 18–48)
MCH RBC QN AUTO: 29.9 PG (ref 27–31)
MCHC RBC AUTO-ENTMCNC: 33.4 G/DL (ref 32–36)
MCV RBC AUTO: 90 FL (ref 82–98)
MONOCYTES # BLD AUTO: 0.6 K/UL (ref 0.3–1)
MONOCYTES NFR BLD: 6.9 % (ref 4–15)
NEUTROPHILS # BLD AUTO: 5.9 K/UL (ref 1.8–7.7)
NEUTROPHILS NFR BLD: 66.6 % (ref 38–73)
NRBC BLD-RTO: 0 /100 WBC
PLATELET # BLD AUTO: 204 K/UL (ref 150–450)
PMV BLD AUTO: 9.1 FL (ref 9.2–12.9)
POTASSIUM SERPL-SCNC: 3.5 MMOL/L (ref 3.5–5.1)
PROT SERPL-MCNC: 7.4 G/DL (ref 6–8.4)
RBC # BLD AUTO: 4.08 M/UL (ref 4.6–6.2)
SODIUM SERPL-SCNC: 141 MMOL/L (ref 136–145)
TROPONIN I SERPL DL<=0.01 NG/ML-MCNC: 8.4 PG/ML (ref 0–60)
WBC # BLD AUTO: 8.88 K/UL (ref 3.9–12.7)

## 2021-12-09 PROCEDURE — 85025 COMPLETE CBC W/AUTO DIFF WBC: CPT | Performed by: EMERGENCY MEDICINE

## 2021-12-09 PROCEDURE — 93005 ELECTROCARDIOGRAM TRACING: CPT

## 2021-12-09 PROCEDURE — 82077 ASSAY SPEC XCP UR&BREATH IA: CPT | Performed by: EMERGENCY MEDICINE

## 2021-12-09 PROCEDURE — 36415 COLL VENOUS BLD VENIPUNCTURE: CPT | Performed by: EMERGENCY MEDICINE

## 2021-12-09 PROCEDURE — 80053 COMPREHEN METABOLIC PANEL: CPT | Performed by: EMERGENCY MEDICINE

## 2021-12-09 PROCEDURE — 83690 ASSAY OF LIPASE: CPT | Performed by: EMERGENCY MEDICINE

## 2021-12-09 PROCEDURE — 99285 EMERGENCY DEPT VISIT HI MDM: CPT | Mod: 25

## 2021-12-09 PROCEDURE — 93010 EKG 12-LEAD: ICD-10-PCS | Mod: ,,, | Performed by: INTERNAL MEDICINE

## 2021-12-09 PROCEDURE — 93010 ELECTROCARDIOGRAM REPORT: CPT | Mod: ,,, | Performed by: INTERNAL MEDICINE

## 2021-12-09 PROCEDURE — 84484 ASSAY OF TROPONIN QUANT: CPT | Performed by: EMERGENCY MEDICINE

## 2021-12-17 ENCOUNTER — PATIENT OUTREACH (OUTPATIENT)
Dept: EMERGENCY MEDICINE | Facility: HOSPITAL | Age: 56
End: 2021-12-17
Payer: MEDICAID

## 2021-12-31 ENCOUNTER — HOSPITAL ENCOUNTER (EMERGENCY)
Facility: HOSPITAL | Age: 56
Discharge: HOME | End: 2021-12-31
Attending: EMERGENCY MEDICINE
Payer: MEDICAID

## 2021-12-31 VITALS
DIASTOLIC BLOOD PRESSURE: 78 MMHG | SYSTOLIC BLOOD PRESSURE: 114 MMHG | HEIGHT: 71 IN | BODY MASS INDEX: 25.34 KG/M2 | OXYGEN SATURATION: 98 % | HEART RATE: 76 BPM | RESPIRATION RATE: 12 BRPM | WEIGHT: 181 LBS | TEMPERATURE: 98 F

## 2021-12-31 DIAGNOSIS — Z11.52 ENCOUNTER FOR SCREENING FOR COVID-19: Primary | ICD-10-CM

## 2021-12-31 LAB
CTP QC/QA: YES
SARS-COV-2 RDRP RESP QL NAA+PROBE: NEGATIVE

## 2021-12-31 PROCEDURE — 99282 EMERGENCY DEPT VISIT SF MDM: CPT | Mod: 25

## 2021-12-31 PROCEDURE — U0002 COVID-19 LAB TEST NON-CDC: HCPCS | Performed by: EMERGENCY MEDICINE
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
## 2022-01-31 ENCOUNTER — HOSPITAL ENCOUNTER (EMERGENCY)
Facility: HOSPITAL | Age: 57
Discharge: HOME | End: 2022-01-31
Attending: STUDENT IN AN ORGANIZED HEALTH CARE EDUCATION/TRAINING PROGRAM
Payer: MEDICAID

## 2022-01-31 VITALS
HEART RATE: 73 BPM | OXYGEN SATURATION: 98 % | RESPIRATION RATE: 18 BRPM | TEMPERATURE: 98 F | SYSTOLIC BLOOD PRESSURE: 141 MMHG | WEIGHT: 182 LBS | BODY MASS INDEX: 25.38 KG/M2 | DIASTOLIC BLOOD PRESSURE: 84 MMHG

## 2022-01-31 DIAGNOSIS — M62.838 MUSCLE SPASM: Primary | ICD-10-CM

## 2022-01-31 PROCEDURE — 99284 EMERGENCY DEPT VISIT MOD MDM: CPT | Mod: 25

## 2022-01-31 PROCEDURE — 96372 THER/PROPH/DIAG INJ SC/IM: CPT

## 2022-01-31 PROCEDURE — 63600175 PHARM REV CODE 636 W HCPCS: Performed by: CLINICAL NURSE SPECIALIST

## 2022-01-31 RX ORDER — ORPHENADRINE CITRATE 30 MG/ML
60 INJECTION INTRAMUSCULAR; INTRAVENOUS
Status: COMPLETED | OUTPATIENT
Start: 2022-01-31 | End: 2022-01-31

## 2022-01-31 RX ADMIN — ORPHENADRINE CITRATE 60 MG: 30 INJECTION INTRAMUSCULAR; INTRAVENOUS at 01:01
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
## 2022-02-18 ENCOUNTER — HOSPITAL ENCOUNTER (EMERGENCY)
Facility: HOSPITAL | Age: 57
Discharge: HOME | End: 2022-02-18
Attending: EMERGENCY MEDICINE
Payer: MEDICAID

## 2022-02-18 VITALS
HEART RATE: 96 BPM | HEIGHT: 71 IN | TEMPERATURE: 99 F | BODY MASS INDEX: 26.6 KG/M2 | RESPIRATION RATE: 20 BRPM | DIASTOLIC BLOOD PRESSURE: 86 MMHG | SYSTOLIC BLOOD PRESSURE: 128 MMHG | OXYGEN SATURATION: 98 % | WEIGHT: 190 LBS

## 2022-02-18 DIAGNOSIS — R52 BODY ACHES: ICD-10-CM

## 2022-02-18 DIAGNOSIS — R05.9 COUGH: Primary | ICD-10-CM

## 2022-02-18 LAB
ALBUMIN SERPL BCP-MCNC: 3.6 G/DL (ref 3.5–5.2)
ALP SERPL-CCNC: 149 U/L (ref 55–135)
ALT SERPL W/O P-5'-P-CCNC: 32 U/L (ref 10–44)
ANION GAP SERPL CALC-SCNC: 3 MMOL/L (ref 8–16)
AST SERPL-CCNC: 17 U/L (ref 10–40)
BASOPHILS # BLD AUTO: 0.02 K/UL (ref 0–0.2)
BASOPHILS NFR BLD: 0.3 % (ref 0–1.9)
BILIRUB SERPL-MCNC: 0.2 MG/DL (ref 0.1–1)
BUN SERPL-MCNC: 13 MG/DL (ref 6–20)
CALCIUM SERPL-MCNC: 8.1 MG/DL (ref 8.7–10.5)
CHLORIDE SERPL-SCNC: 110 MMOL/L (ref 95–110)
CO2 SERPL-SCNC: 31 MMOL/L (ref 23–29)
CREAT SERPL-MCNC: 0.6 MG/DL (ref 0.5–1.4)
CTP QC/QA: YES
CTP QC/QA: YES
DIFFERENTIAL METHOD: ABNORMAL
EOSINOPHIL # BLD AUTO: 0.3 K/UL (ref 0–0.5)
EOSINOPHIL NFR BLD: 3.5 % (ref 0–8)
ERYTHROCYTE [DISTWIDTH] IN BLOOD BY AUTOMATED COUNT: 12.1 % (ref 11.5–14.5)
EST. GFR  (AFRICAN AMERICAN): >60 ML/MIN/1.73 M^2
EST. GFR  (NON AFRICAN AMERICAN): >60 ML/MIN/1.73 M^2
GLUCOSE SERPL-MCNC: 107 MG/DL (ref 70–110)
HCT VFR BLD AUTO: 39.4 % (ref 40–54)
HGB BLD-MCNC: 13.5 G/DL (ref 14–18)
IMM GRANULOCYTES # BLD AUTO: 0.03 K/UL (ref 0–0.04)
IMM GRANULOCYTES NFR BLD AUTO: 0.4 % (ref 0–0.5)
LYMPHOCYTES # BLD AUTO: 1.9 K/UL (ref 1–4.8)
LYMPHOCYTES NFR BLD: 26.4 % (ref 18–48)
MCH RBC QN AUTO: 30.5 PG (ref 27–31)
MCHC RBC AUTO-ENTMCNC: 34.3 G/DL (ref 32–36)
MCV RBC AUTO: 89 FL (ref 82–98)
MONOCYTES # BLD AUTO: 0.4 K/UL (ref 0.3–1)
MONOCYTES NFR BLD: 5.6 % (ref 4–15)
NEUTROPHILS # BLD AUTO: 4.5 K/UL (ref 1.8–7.7)
NEUTROPHILS NFR BLD: 63.8 % (ref 38–73)
NRBC BLD-RTO: 0 /100 WBC
PLATELET # BLD AUTO: 237 K/UL (ref 150–450)
PMV BLD AUTO: 9.4 FL (ref 9.2–12.9)
POC MOLECULAR INFLUENZA A AGN: NEGATIVE
POC MOLECULAR INFLUENZA B AGN: NEGATIVE
POTASSIUM SERPL-SCNC: 3.7 MMOL/L (ref 3.5–5.1)
PROT SERPL-MCNC: 7.5 G/DL (ref 6–8.4)
RBC # BLD AUTO: 4.42 M/UL (ref 4.6–6.2)
SARS-COV-2 RDRP RESP QL NAA+PROBE: NEGATIVE
SODIUM SERPL-SCNC: 144 MMOL/L (ref 136–145)
TROPONIN I SERPL DL<=0.01 NG/ML-MCNC: 10.7 PG/ML (ref 0–60)
WBC # BLD AUTO: 7.09 K/UL (ref 3.9–12.7)

## 2022-02-18 PROCEDURE — 85025 COMPLETE CBC W/AUTO DIFF WBC: CPT | Performed by: EMERGENCY MEDICINE

## 2022-02-18 PROCEDURE — U0002 COVID-19 LAB TEST NON-CDC: HCPCS | Performed by: EMERGENCY MEDICINE

## 2022-02-18 PROCEDURE — 25000003 PHARM REV CODE 250: Performed by: EMERGENCY MEDICINE

## 2022-02-18 PROCEDURE — 36415 COLL VENOUS BLD VENIPUNCTURE: CPT | Performed by: EMERGENCY MEDICINE

## 2022-02-18 PROCEDURE — 84484 ASSAY OF TROPONIN QUANT: CPT | Performed by: EMERGENCY MEDICINE

## 2022-02-18 PROCEDURE — 80053 COMPREHEN METABOLIC PANEL: CPT | Performed by: EMERGENCY MEDICINE

## 2022-02-18 PROCEDURE — 99283 EMERGENCY DEPT VISIT LOW MDM: CPT | Mod: 25

## 2022-02-18 RX ORDER — MAG HYDROX/ALUMINUM HYD/SIMETH 200-200-20
30 SUSPENSION, ORAL (FINAL DOSE FORM) ORAL ONCE
Status: COMPLETED | OUTPATIENT
Start: 2022-02-18 | End: 2022-02-18

## 2022-02-18 RX ORDER — BENZONATATE 100 MG/1
100 CAPSULE ORAL 3 TIMES DAILY PRN
Qty: 20 CAPSULE | Refills: 0 | Status: SHIPPED | OUTPATIENT
Start: 2022-02-18 | End: 2022-02-28

## 2022-02-18 RX ORDER — LIDOCAINE HYDROCHLORIDE 20 MG/ML
10 SOLUTION OROPHARYNGEAL ONCE
Status: COMPLETED | OUTPATIENT
Start: 2022-02-18 | End: 2022-02-18

## 2022-02-18 RX ADMIN — ALUMINUM HYDROXIDE, MAGNESIUM HYDROXIDE, AND SIMETHICONE 30 ML: 200; 200; 20 SUSPENSION ORAL at 06:02

## 2022-02-18 RX ADMIN — LIDOCAINE HYDROCHLORIDE 10 ML: 20 SOLUTION ORAL; TOPICAL at 06:02
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
## 2022-05-02 ENCOUNTER — HOSPITAL ENCOUNTER (EMERGENCY)
Facility: HOSPITAL | Age: 57
Discharge: HOME | End: 2022-05-02
Attending: EMERGENCY MEDICINE
Payer: MEDICAID

## 2022-05-02 VITALS
SYSTOLIC BLOOD PRESSURE: 134 MMHG | DIASTOLIC BLOOD PRESSURE: 89 MMHG | WEIGHT: 185 LBS | RESPIRATION RATE: 18 BRPM | OXYGEN SATURATION: 97 % | TEMPERATURE: 99 F | BODY MASS INDEX: 25.8 KG/M2 | HEART RATE: 69 BPM

## 2022-05-02 DIAGNOSIS — T14.8XXA ABRASION: Primary | ICD-10-CM

## 2022-05-02 PROCEDURE — 99283 EMERGENCY DEPT VISIT LOW MDM: CPT | Mod: 25

## 2022-05-02 PROCEDURE — 12001 RPR S/N/AX/GEN/TRNK 2.5CM/<: CPT

## 2022-05-02 RX ORDER — CLINDAMYCIN HYDROCHLORIDE 150 MG/1
300 CAPSULE ORAL 4 TIMES DAILY
Qty: 40 CAPSULE | Refills: 0 | Status: SHIPPED | OUTPATIENT
Start: 2022-05-02 | End: 2022-05-07
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
## 2022-06-21 ENCOUNTER — HOSPITAL ENCOUNTER (EMERGENCY)
Facility: HOSPITAL | Age: 57
Discharge: HOME | End: 2022-06-21
Attending: EMERGENCY MEDICINE
Payer: MEDICAID

## 2022-06-21 VITALS
HEART RATE: 75 BPM | SYSTOLIC BLOOD PRESSURE: 111 MMHG | OXYGEN SATURATION: 97 % | DIASTOLIC BLOOD PRESSURE: 85 MMHG | BODY MASS INDEX: 25.62 KG/M2 | WEIGHT: 183 LBS | RESPIRATION RATE: 18 BRPM | HEIGHT: 71 IN | TEMPERATURE: 98 F

## 2022-06-21 DIAGNOSIS — R11.2 NAUSEA VOMITING AND DIARRHEA: Primary | ICD-10-CM

## 2022-06-21 DIAGNOSIS — R19.7 NAUSEA VOMITING AND DIARRHEA: Primary | ICD-10-CM

## 2022-06-21 LAB
CTP QC/QA: YES
CTP QC/QA: YES
POC MOLECULAR INFLUENZA A AGN: NEGATIVE
POC MOLECULAR INFLUENZA B AGN: NEGATIVE
SARS-COV-2 RDRP RESP QL NAA+PROBE: NEGATIVE

## 2022-06-21 PROCEDURE — 87502 INFLUENZA DNA AMP PROBE: CPT

## 2022-06-21 PROCEDURE — U0002 COVID-19 LAB TEST NON-CDC: HCPCS | Performed by: EMERGENCY MEDICINE

## 2022-06-21 PROCEDURE — 99285 EMERGENCY DEPT VISIT HI MDM: CPT

## 2022-06-21 RX ORDER — ONDANSETRON 4 MG/1
4 TABLET, FILM COATED ORAL EVERY 6 HOURS PRN
Qty: 12 TABLET | Refills: 0 | Status: SHIPPED | OUTPATIENT
Start: 2022-06-21
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
## 2022-07-02 ENCOUNTER — HOSPITAL ENCOUNTER (EMERGENCY)
Facility: HOSPITAL | Age: 57
Discharge: HOME | End: 2022-07-02
Attending: STUDENT IN AN ORGANIZED HEALTH CARE EDUCATION/TRAINING PROGRAM
Payer: MEDICAID

## 2022-07-02 VITALS
SYSTOLIC BLOOD PRESSURE: 108 MMHG | DIASTOLIC BLOOD PRESSURE: 84 MMHG | BODY MASS INDEX: 25.9 KG/M2 | WEIGHT: 185 LBS | HEIGHT: 71 IN | TEMPERATURE: 99 F | OXYGEN SATURATION: 100 % | HEART RATE: 74 BPM | RESPIRATION RATE: 20 BRPM

## 2022-07-02 DIAGNOSIS — M25.562 ACUTE PAIN OF LEFT KNEE: Primary | ICD-10-CM

## 2022-07-02 PROCEDURE — 99283 EMERGENCY DEPT VISIT LOW MDM: CPT

## 2022-07-02 PROCEDURE — 63600175 PHARM REV CODE 636 W HCPCS: Performed by: NURSE PRACTITIONER

## 2022-07-02 RX ORDER — KETOROLAC TROMETHAMINE 10 MG/1
10 TABLET, FILM COATED ORAL
Status: DISCONTINUED | OUTPATIENT
Start: 2022-07-02 | End: 2022-07-02

## 2022-07-02 RX ORDER — PREDNISONE 20 MG/1
40 TABLET ORAL
Status: COMPLETED | OUTPATIENT
Start: 2022-07-02 | End: 2022-07-02

## 2022-07-02 RX ORDER — PREDNISONE 20 MG/1
20 TABLET ORAL DAILY
Qty: 5 TABLET | Refills: 0 | Status: SHIPPED | OUTPATIENT
Start: 2022-07-02 | End: 2022-07-07

## 2022-07-02 RX ADMIN — PREDNISONE 40 MG: 20 TABLET ORAL at 08:07
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
## 2022-07-04 ENCOUNTER — HOSPITAL ENCOUNTER (EMERGENCY)
Facility: HOSPITAL | Age: 57
Discharge: HOME | End: 2022-07-04
Attending: EMERGENCY MEDICINE
Payer: MEDICAID

## 2022-07-04 VITALS
TEMPERATURE: 99 F | OXYGEN SATURATION: 98 % | HEART RATE: 85 BPM | WEIGHT: 185 LBS | RESPIRATION RATE: 18 BRPM | SYSTOLIC BLOOD PRESSURE: 103 MMHG | BODY MASS INDEX: 25.9 KG/M2 | HEIGHT: 71 IN | DIASTOLIC BLOOD PRESSURE: 85 MMHG

## 2022-07-04 DIAGNOSIS — M25.562 ACUTE PAIN OF LEFT KNEE: Primary | ICD-10-CM

## 2022-07-04 DIAGNOSIS — M25.569 KNEE PAIN: ICD-10-CM

## 2022-07-04 DIAGNOSIS — M25.462 KNEE EFFUSION, LEFT: ICD-10-CM

## 2022-07-04 PROCEDURE — 99284 EMERGENCY DEPT VISIT MOD MDM: CPT | Mod: 25

## 2022-07-04 PROCEDURE — 63600175 PHARM REV CODE 636 W HCPCS: Performed by: EMERGENCY MEDICINE

## 2022-07-04 PROCEDURE — 25000003 PHARM REV CODE 250: Performed by: EMERGENCY MEDICINE

## 2022-07-04 PROCEDURE — 96372 THER/PROPH/DIAG INJ SC/IM: CPT | Performed by: EMERGENCY MEDICINE

## 2022-07-04 RX ORDER — NAPROXEN 500 MG/1
500 TABLET ORAL EVERY 12 HOURS PRN
Qty: 20 TABLET | Refills: 0 | OUTPATIENT
Start: 2022-07-04 | End: 2023-01-21

## 2022-07-04 RX ORDER — HYDROCODONE BITARTRATE AND ACETAMINOPHEN 5; 325 MG/1; MG/1
1 TABLET ORAL
Status: COMPLETED | OUTPATIENT
Start: 2022-07-04 | End: 2022-07-04

## 2022-07-04 RX ORDER — KETOROLAC TROMETHAMINE 30 MG/ML
15 INJECTION, SOLUTION INTRAMUSCULAR; INTRAVENOUS
Status: COMPLETED | OUTPATIENT
Start: 2022-07-04 | End: 2022-07-04

## 2022-07-04 RX ORDER — HYDROCODONE BITARTRATE AND ACETAMINOPHEN 5; 325 MG/1; MG/1
1 TABLET ORAL EVERY 8 HOURS PRN
Qty: 6 TABLET | Refills: 0 | Status: SHIPPED | OUTPATIENT
Start: 2022-07-04

## 2022-07-04 RX ADMIN — KETOROLAC TROMETHAMINE 15 MG: 30 INJECTION, SOLUTION INTRAMUSCULAR at 11:07

## 2022-07-04 RX ADMIN — HYDROCODONE BITARTRATE AND ACETAMINOPHEN 1 TABLET: 5; 325 TABLET ORAL at 11:07
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
## 2022-08-28 ENCOUNTER — HOSPITAL ENCOUNTER (EMERGENCY)
Facility: HOSPITAL | Age: 57
Discharge: HOME | End: 2022-08-28
Attending: EMERGENCY MEDICINE
Payer: MEDICAID

## 2022-08-28 VITALS
WEIGHT: 193 LBS | RESPIRATION RATE: 18 BRPM | BODY MASS INDEX: 27.02 KG/M2 | HEIGHT: 71 IN | SYSTOLIC BLOOD PRESSURE: 119 MMHG | OXYGEN SATURATION: 100 % | DIASTOLIC BLOOD PRESSURE: 80 MMHG | HEART RATE: 76 BPM | TEMPERATURE: 98 F

## 2022-08-28 DIAGNOSIS — M25.561 CHRONIC PAIN OF BOTH KNEES: Primary | ICD-10-CM

## 2022-08-28 DIAGNOSIS — M25.462 BILATERAL KNEE EFFUSIONS: ICD-10-CM

## 2022-08-28 DIAGNOSIS — G89.29 CHRONIC PAIN OF BOTH KNEES: Primary | ICD-10-CM

## 2022-08-28 DIAGNOSIS — M25.461 BILATERAL KNEE EFFUSIONS: ICD-10-CM

## 2022-08-28 DIAGNOSIS — M25.562 CHRONIC PAIN OF BOTH KNEES: Primary | ICD-10-CM

## 2022-08-28 PROCEDURE — 99283 EMERGENCY DEPT VISIT LOW MDM: CPT

## 2022-08-28 RX ORDER — NAPROXEN 500 MG/1
500 TABLET ORAL EVERY 12 HOURS PRN
Qty: 20 TABLET | Refills: 0 | Status: SHIPPED | OUTPATIENT
Start: 2022-08-28 | End: 2023-01-21 | Stop reason: SDUPTHER

## 2022-08-28 RX ORDER — KETOROLAC TROMETHAMINE 30 MG/ML
15 INJECTION, SOLUTION INTRAMUSCULAR; INTRAVENOUS
Status: DISCONTINUED | OUTPATIENT
Start: 2022-08-28 | End: 2022-08-28 | Stop reason: HOSPADM
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
## 2022-08-29 ENCOUNTER — TELEPHONE (OUTPATIENT)
Dept: ORTHOPEDICS | Facility: CLINIC | Age: 57
End: 2022-08-29
Payer: MEDICAID

## 2022-08-29 DIAGNOSIS — M17.0 PRIMARY OSTEOARTHRITIS OF BOTH KNEES: Primary | ICD-10-CM

## 2022-08-29 NOTE — TELEPHONE ENCOUNTER
Called patient to confirm appt for tomorrow and the need to get xrays before his appt. No answer and unable to leave VM

## 2022-08-30 ENCOUNTER — HOSPITAL ENCOUNTER (OUTPATIENT)
Dept: RADIOLOGY | Facility: HOSPITAL | Age: 57
Discharge: HOME | End: 2022-08-30
Attending: NURSE PRACTITIONER
Payer: MEDICAID

## 2022-08-30 ENCOUNTER — OFFICE VISIT (OUTPATIENT)
Dept: ORTHOPEDICS | Facility: CLINIC | Age: 57
End: 2022-08-30
Payer: MEDICAID

## 2022-08-30 VITALS — DIASTOLIC BLOOD PRESSURE: 69 MMHG | OXYGEN SATURATION: 95 % | SYSTOLIC BLOOD PRESSURE: 102 MMHG | HEART RATE: 70 BPM

## 2022-08-30 DIAGNOSIS — M25.561 CHRONIC PAIN OF BOTH KNEES: ICD-10-CM

## 2022-08-30 DIAGNOSIS — M17.0 PRIMARY OSTEOARTHRITIS OF BOTH KNEES: ICD-10-CM

## 2022-08-30 DIAGNOSIS — M25.562 CHRONIC PAIN OF BOTH KNEES: ICD-10-CM

## 2022-08-30 DIAGNOSIS — M17.0 PRIMARY OSTEOARTHRITIS OF BOTH KNEES: Primary | ICD-10-CM

## 2022-08-30 DIAGNOSIS — G89.29 CHRONIC PAIN OF BOTH KNEES: ICD-10-CM

## 2022-08-30 PROCEDURE — 1160F PR REVIEW ALL MEDS BY PRESCRIBER/CLIN PHARMACIST DOCUMENTED: ICD-10-PCS | Mod: CPTII,,, | Performed by: NURSE PRACTITIONER

## 2022-08-30 PROCEDURE — 1160F RVW MEDS BY RX/DR IN RCRD: CPT | Mod: CPTII,,, | Performed by: NURSE PRACTITIONER

## 2022-08-30 PROCEDURE — 20610 DRAIN/INJ JOINT/BURSA W/O US: CPT | Mod: 50,PBBFAC | Performed by: NURSE PRACTITIONER

## 2022-08-30 PROCEDURE — 3078F PR MOST RECENT DIASTOLIC BLOOD PRESSURE < 80 MM HG: ICD-10-PCS | Mod: CPTII,,, | Performed by: NURSE PRACTITIONER

## 2022-08-30 PROCEDURE — 99999 PR PBB SHADOW E&M-EST. PATIENT-LVL IV: CPT | Mod: PBBFAC,,, | Performed by: NURSE PRACTITIONER

## 2022-08-30 PROCEDURE — 99204 OFFICE O/P NEW MOD 45 MIN: CPT | Mod: 25,S$PBB,, | Performed by: NURSE PRACTITIONER

## 2022-08-30 PROCEDURE — 1159F MED LIST DOCD IN RCRD: CPT | Mod: CPTII,,, | Performed by: NURSE PRACTITIONER

## 2022-08-30 PROCEDURE — 1159F PR MEDICATION LIST DOCUMENTED IN MEDICAL RECORD: ICD-10-PCS | Mod: CPTII,,, | Performed by: NURSE PRACTITIONER

## 2022-08-30 PROCEDURE — 3078F DIAST BP <80 MM HG: CPT | Mod: CPTII,,, | Performed by: NURSE PRACTITIONER

## 2022-08-30 PROCEDURE — 3074F SYST BP LT 130 MM HG: CPT | Mod: CPTII,,, | Performed by: NURSE PRACTITIONER

## 2022-08-30 PROCEDURE — 20610 LARGE JOINT ASPIRATION/INJECTION: BILATERAL KNEE: ICD-10-PCS | Mod: 50,S$PBB,, | Performed by: NURSE PRACTITIONER

## 2022-08-30 PROCEDURE — 3074F PR MOST RECENT SYSTOLIC BLOOD PRESSURE < 130 MM HG: ICD-10-PCS | Mod: CPTII,,, | Performed by: NURSE PRACTITIONER

## 2022-08-30 PROCEDURE — 99214 OFFICE O/P EST MOD 30 MIN: CPT | Mod: PBBFAC,25 | Performed by: NURSE PRACTITIONER

## 2022-08-30 PROCEDURE — 99204 PR OFFICE/OUTPT VISIT, NEW, LEVL IV, 45-59 MIN: ICD-10-PCS | Mod: 25,S$PBB,, | Performed by: NURSE PRACTITIONER

## 2022-08-30 PROCEDURE — 99999 PR PBB SHADOW E&M-EST. PATIENT-LVL IV: ICD-10-PCS | Mod: PBBFAC,,, | Performed by: NURSE PRACTITIONER

## 2022-08-30 PROCEDURE — 20610 DRAIN/INJ JOINT/BURSA W/O US: CPT | Mod: 50,S$PBB,, | Performed by: NURSE PRACTITIONER

## 2022-08-30 PROCEDURE — 73562 X-RAY EXAM OF KNEE 3: CPT | Mod: TC,50

## 2022-08-30 RX ADMIN — BETAMETHASONE SODIUM PHOSPHATE AND BETAMETHASONE ACETATE 6 MG: 3; 3 INJECTION, SUSPENSION INTRA-ARTICULAR; INTRALESIONAL; INTRAMUSCULAR; SOFT TISSUE at 02:08

## 2022-08-30 NOTE — PROGRESS NOTES
Subjective:      Arnulfo Fay is a 56 y.o. male referred by [facility] ER for evaluation and treatment of chronic bilateral knee pain. His PCP is TAMIKA Fay NP. This is evaluated as a  non-  injury. The pain has been off and on for 1-2 years but has been worsening over the past two months. He states that he has been to the ER a few times due to the pain in the past 2 months. He presents and rates his pain as 8/10 in both knees with the left knee bothering him more than the right. The pain's location is medial bilaterally.  He describes the symptoms as sharp and throbbing. He has been taking Nsaid without relief. He is using ACE wraps to both knees. He has been using a cane for support. Symptoms improve minimal with rest, ace wraps, cane and Nsaid. Symptoms worsen with activity, getting up from a chair, weight bearing, sitting for prolonged periods of time. The knees have given out or felt unstable at times. The patient cannot bend and straighten the knee fully. Treatment to date has been NSAID's, without significant relief. He is noted with a limp. He has had no recent therapy or injections done. He has not seen orthopedics.    Outside reports reviewed: ER records.       Review of Systems   Constitutional: Negative.  Negative for fever.   Musculoskeletal:  Positive for joint pain.   Skin: Negative.    Neurological: Negative.    Psychiatric/Behavioral: Negative.     All other systems reviewed and are negative.   Objective:     NVI  General :   alert, appears stated age, and cooperative   Gait: Antalgic. The patient has difficulty bearing weight on the extremity.   Bilateral Lower Extremity  Hip Palpation:  no tenderness over the greater trochanters   Hip ROM: 100% of normal    Knee Effusion:  0-1+ B/L   Ecchymosis:  none   Knee ROM:  5 to 110 degrees with subpatellar crepitance.   Patella:  Patella does track normally.  Patellar apprehension test: negative  Patellar compression test: negative   Tenderness: medial  joint line, LT > RT   Stability:  Lachman's test: negative  Posterior drawer: negative  Medial collateral ligament: guarded LT  Lateral collateral ligament: negative     Dominique Test:  negative   Melody's Test:  Guarded B/L with no joint line tenderness   Sensation:   intact to light touch   Pulses: normal DP and PT pulses.    Mild quad weakness noted.   Brisk cap refill.     Imaging  Radiographs Bilateral knees ordered and reviewed:   Chondrocalcinosis of the medial and lateral meniscus of the right knee.    Mild hypertrophic change about the right knee.    A curvilinear radiopaque foreign body measuring 8 mm along the anterolateral aspect of the proximal right tibia without adjacent soft tissue swelling, suggestive of a chronic finding.  No joint effusion.    Chondrocalcinosis of medial and lateral meniscus of left knee.    Hypertrophic change about the left knee joint.  No joint effusion.     Assessment:     Bilateral knee pain, DJD, chondrocalcinosis     Plan:     Treatment options discussed including Home physician directed exercises, packet given. To be done 3 x wk x 6 wks.   Activity modification discussed and recommended.  Ice and Duexis bid. (Sample) Limit Stairs and squatting.   Bilateral intra-articular steroid injections done, see note.  Crossfire bioskin knee brace (Lg) LT knee for joint support and compression. He was instructed on application and usage.   Follow up: 10 days for reevaluation.   He had no further questions.      PROCEDURE: Bilateral intra-articular steroid injections  I have explained the risks, benefits, and alternatives of the procedure in detail.  The patient voices understanding and all questions have been answered.  The patient agrees to proceed as planned.      So after I performed a sterile prep of the skin in the normal fashion, the right knee is injected using a 22 gauge needle from the anterolateral approach with a combination of 3cc 1% plain lidocaine, 0.5% marcaine and 6mg of  celestone .       I then performed a sterile prep of the skin in the normal fashion, the left knee is injected using a 22 gauge needle from the anterolateral approach with a combination of 3cc 1% plain lidocaine, 0.5% marcaine and 6mg of celestone .       The patient is cautioned and immediate relief of pain is secondary to the local anesthetic and will be temporary.  After the anesthetic wears off there may be a increase in pain that may last for a few hours or a few days and they should use ice to help alleviate this flair up of pain.

## 2022-08-30 NOTE — PROCEDURES
Large Joint Aspiration/Injection: bilateral knee    Date/Time: 8/30/2022 2:30 PM  Performed by: Mathew Valdes NP  Authorized by: Mathew Valdes NP     Consent Done?:  Yes (Written)  Indications:  Arthritis  Site marked: the procedure site was marked    Timeout: prior to procedure the correct patient, procedure, and site was verified    Prep: patient was prepped and draped in usual sterile fashion    Local anesthesia used?: No    Local anesthetic:  Lidocaine 1% without epinephrine and bupivacaine 0.5% without epinephrine  Anesthetic total (ml):  2      Details:  Needle Size:  22 G  Ultrasonic Guidance for needle placement?: No    Approach:  Anterolateral  Location:  Knee  Laterality:  Bilateral  Site:  Bilateral knee  Medications (Right):  6 mg betamethasone acetate-betamethasone sodium phosphate 6 mg/mL  Aspirate (Right) amount (mL):  3  Aspirate (Right):  Clear  Medications (Left):  6 mg betamethasone acetate-betamethasone sodium phosphate 6 mg/mL  Aspirate (Left) amount (mL):  3  Aspirate (Left):  Clear  Patient tolerance:  Patient tolerated the procedure well with no immediate complications     Risks reviewed: pain, swelling, infection and nerve or tendon injury.     LILY Bernard MA in room

## 2022-08-31 RX ORDER — BETAMETHASONE SODIUM PHOSPHATE AND BETAMETHASONE ACETATE 3; 3 MG/ML; MG/ML
6 INJECTION, SUSPENSION INTRA-ARTICULAR; INTRALESIONAL; INTRAMUSCULAR; SOFT TISSUE
Status: DISCONTINUED | OUTPATIENT
Start: 2022-08-30 | End: 2022-08-31 | Stop reason: HOSPADM

## 2022-09-08 ENCOUNTER — OFFICE VISIT (OUTPATIENT)
Dept: ORTHOPEDICS | Facility: CLINIC | Age: 57
End: 2022-09-08
Payer: MEDICAID

## 2022-09-08 VITALS — SYSTOLIC BLOOD PRESSURE: 132 MMHG | DIASTOLIC BLOOD PRESSURE: 84 MMHG

## 2022-09-08 DIAGNOSIS — G89.29 CHRONIC PAIN OF BOTH KNEES: Primary | ICD-10-CM

## 2022-09-08 DIAGNOSIS — M17.0 PRIMARY OSTEOARTHRITIS OF BOTH KNEES: ICD-10-CM

## 2022-09-08 DIAGNOSIS — M25.562 CHRONIC PAIN OF BOTH KNEES: Primary | ICD-10-CM

## 2022-09-08 DIAGNOSIS — M25.561 CHRONIC PAIN OF BOTH KNEES: Primary | ICD-10-CM

## 2022-09-08 PROCEDURE — 3079F DIAST BP 80-89 MM HG: CPT | Mod: CPTII,,, | Performed by: NURSE PRACTITIONER

## 2022-09-08 PROCEDURE — 99999 PR PBB SHADOW E&M-EST. PATIENT-LVL III: CPT | Mod: PBBFAC,,, | Performed by: NURSE PRACTITIONER

## 2022-09-08 PROCEDURE — 99213 OFFICE O/P EST LOW 20 MIN: CPT | Mod: S$PBB,,, | Performed by: NURSE PRACTITIONER

## 2022-09-08 PROCEDURE — 1160F RVW MEDS BY RX/DR IN RCRD: CPT | Mod: CPTII,,, | Performed by: NURSE PRACTITIONER

## 2022-09-08 PROCEDURE — 3079F PR MOST RECENT DIASTOLIC BLOOD PRESSURE 80-89 MM HG: ICD-10-PCS | Mod: CPTII,,, | Performed by: NURSE PRACTITIONER

## 2022-09-08 PROCEDURE — 1159F MED LIST DOCD IN RCRD: CPT | Mod: CPTII,,, | Performed by: NURSE PRACTITIONER

## 2022-09-08 PROCEDURE — 1160F PR REVIEW ALL MEDS BY PRESCRIBER/CLIN PHARMACIST DOCUMENTED: ICD-10-PCS | Mod: CPTII,,, | Performed by: NURSE PRACTITIONER

## 2022-09-08 PROCEDURE — 3075F PR MOST RECENT SYSTOLIC BLOOD PRESS GE 130-139MM HG: ICD-10-PCS | Mod: CPTII,,, | Performed by: NURSE PRACTITIONER

## 2022-09-08 PROCEDURE — 99999 PR PBB SHADOW E&M-EST. PATIENT-LVL III: ICD-10-PCS | Mod: PBBFAC,,, | Performed by: NURSE PRACTITIONER

## 2022-09-08 PROCEDURE — 99213 OFFICE O/P EST LOW 20 MIN: CPT | Mod: PBBFAC | Performed by: NURSE PRACTITIONER

## 2022-09-08 PROCEDURE — 99213 PR OFFICE/OUTPT VISIT, EST, LEVL III, 20-29 MIN: ICD-10-PCS | Mod: S$PBB,,, | Performed by: NURSE PRACTITIONER

## 2022-09-08 PROCEDURE — 1159F PR MEDICATION LIST DOCUMENTED IN MEDICAL RECORD: ICD-10-PCS | Mod: CPTII,,, | Performed by: NURSE PRACTITIONER

## 2022-09-08 PROCEDURE — 3075F SYST BP GE 130 - 139MM HG: CPT | Mod: CPTII,,, | Performed by: NURSE PRACTITIONER

## 2022-09-08 NOTE — PROGRESS NOTES
Subjective:      Follow up: Bilateral knees:      Arnulfo Fay is a 56 y.o. male returns for follow up for chronic bilateral knee pain. He presents and rates his pain as 0/10 in both knees. He states that the steroid injection have helped a lot. He denies any swelling, locking or giving way. Symptoms worsen with activity, getting up from a chair, weight bearing, sitting for prolonged periods of time.  He is noted without a limp.           Review of Systems   Constitutional: Negative.  Negative for fever.   Musculoskeletal:  Positive for joint pain.   Skin: Negative.    Neurological: Negative.    Psychiatric/Behavioral: Negative.     All other systems reviewed and are negative.   Objective:      NVI  General :   alert, appears stated age, and cooperative   Gait: Normal.   Bilateral Lower Extremity  Hip Palpation:  no tenderness over the greater trochanters   Hip ROM: 100% of normal    Knee Effusion:  none   Ecchymosis:  none   Knee ROM:  5 to 110 degrees with subpatellar crepitance.   Patella:  Patella does track normally.  Patellar apprehension test: negative  Patellar compression test: negative   Tenderness: medial joint line,-minimal   Stability:  Lachman's test: negative  Posterior drawer: negative  Medial collateral ligament: negative  Lateral collateral ligament: negative     Dominique Test:  negative   Melody's Test:  negative   Sensation:   intact to light touch   Pulses: normal DP and PT pulses.    Mild quad weakness noted.   Brisk cap refill.      Imaging  None today  Assessment:      Bilateral knee pain, DJD, chondrocalcinosis     Plan:      He is doing well post steroid injections. He was instructed that we can repeat the injections in 3-4 months if needed.  Continue home physician directed exercises.   Ice and Nsaid as needed. Limit Stairs and squatting.   Continue crossfire bioskin knee brace (Lg) LT knee for joint support and compression.   Follow up: prn   He had no further questions.

## 2022-09-12 ENCOUNTER — HOSPITAL ENCOUNTER (EMERGENCY)
Facility: HOSPITAL | Age: 57
Discharge: HOME | End: 2022-09-12
Attending: EMERGENCY MEDICINE
Payer: MEDICAID

## 2022-09-12 VITALS
SYSTOLIC BLOOD PRESSURE: 124 MMHG | BODY MASS INDEX: 26.08 KG/M2 | HEART RATE: 84 BPM | TEMPERATURE: 98 F | WEIGHT: 187 LBS | DIASTOLIC BLOOD PRESSURE: 75 MMHG | OXYGEN SATURATION: 98 % | RESPIRATION RATE: 16 BRPM

## 2022-09-12 DIAGNOSIS — R19.7 NAUSEA VOMITING AND DIARRHEA: ICD-10-CM

## 2022-09-12 DIAGNOSIS — K52.9 GASTROENTERITIS: Primary | ICD-10-CM

## 2022-09-12 DIAGNOSIS — R11.2 NAUSEA VOMITING AND DIARRHEA: ICD-10-CM

## 2022-09-12 PROCEDURE — 99283 EMERGENCY DEPT VISIT LOW MDM: CPT | Mod: 25

## 2022-09-12 PROCEDURE — 25000003 PHARM REV CODE 250: Performed by: EMERGENCY MEDICINE

## 2022-09-12 PROCEDURE — U0002 COVID-19 LAB TEST NON-CDC: HCPCS | Performed by: EMERGENCY MEDICINE

## 2022-09-12 PROCEDURE — 87502 INFLUENZA DNA AMP PROBE: CPT

## 2022-09-12 RX ORDER — ONDANSETRON 4 MG/1
8 TABLET, ORALLY DISINTEGRATING ORAL
Status: COMPLETED | OUTPATIENT
Start: 2022-09-12 | End: 2022-09-12

## 2022-09-12 RX ORDER — ONDANSETRON 4 MG/1
4 TABLET, ORALLY DISINTEGRATING ORAL EVERY 8 HOURS PRN
Qty: 12 TABLET | Refills: 0 | Status: SHIPPED | OUTPATIENT
Start: 2022-09-12

## 2022-09-12 RX ADMIN — ONDANSETRON 8 MG: 4 TABLET, ORALLY DISINTEGRATING ORAL at 03:09
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
## 2022-09-23 ENCOUNTER — HOSPITAL ENCOUNTER (EMERGENCY)
Facility: HOSPITAL | Age: 57
Discharge: HOME | End: 2022-09-23
Attending: EMERGENCY MEDICINE
Payer: MEDICAID

## 2022-09-23 VITALS
TEMPERATURE: 98 F | WEIGHT: 177 LBS | DIASTOLIC BLOOD PRESSURE: 70 MMHG | RESPIRATION RATE: 18 BRPM | SYSTOLIC BLOOD PRESSURE: 111 MMHG | HEIGHT: 71 IN | BODY MASS INDEX: 24.78 KG/M2 | HEART RATE: 75 BPM | OXYGEN SATURATION: 96 %

## 2022-09-23 DIAGNOSIS — R07.9 CHEST PAIN, UNSPECIFIED TYPE: ICD-10-CM

## 2022-09-23 DIAGNOSIS — R07.9 CHEST PAIN: ICD-10-CM

## 2022-09-23 DIAGNOSIS — R07.89 ATYPICAL CHEST PAIN: Primary | ICD-10-CM

## 2022-09-23 LAB
ALBUMIN SERPL BCP-MCNC: 3.8 G/DL (ref 3.5–5.2)
ALP SERPL-CCNC: 176 U/L (ref 55–135)
ALT SERPL W/O P-5'-P-CCNC: 34 U/L (ref 10–44)
ANION GAP SERPL CALC-SCNC: 2 MMOL/L (ref 8–16)
AST SERPL-CCNC: 19 U/L (ref 10–40)
BASOPHILS # BLD AUTO: 0.03 K/UL (ref 0–0.2)
BASOPHILS NFR BLD: 0.3 % (ref 0–1.9)
BILIRUB SERPL-MCNC: 0.3 MG/DL (ref 0.1–1)
BUN SERPL-MCNC: 13 MG/DL (ref 6–20)
CALCIUM SERPL-MCNC: 8.5 MG/DL (ref 8.7–10.5)
CHLORIDE SERPL-SCNC: 106 MMOL/L (ref 95–110)
CO2 SERPL-SCNC: 32 MMOL/L (ref 23–29)
CREAT SERPL-MCNC: 0.6 MG/DL (ref 0.5–1.4)
DIFFERENTIAL METHOD: ABNORMAL
EOSINOPHIL # BLD AUTO: 0.5 K/UL (ref 0–0.5)
EOSINOPHIL NFR BLD: 4.8 % (ref 0–8)
ERYTHROCYTE [DISTWIDTH] IN BLOOD BY AUTOMATED COUNT: 11.9 % (ref 11.5–14.5)
EST. GFR  (NO RACE VARIABLE): >60 ML/MIN/1.73 M^2
GLUCOSE SERPL-MCNC: 138 MG/DL (ref 70–110)
HCT VFR BLD AUTO: 39.9 % (ref 40–54)
HGB BLD-MCNC: 13.6 G/DL (ref 14–18)
IMM GRANULOCYTES # BLD AUTO: 0.02 K/UL (ref 0–0.04)
IMM GRANULOCYTES NFR BLD AUTO: 0.2 % (ref 0–0.5)
LYMPHOCYTES # BLD AUTO: 3.1 K/UL (ref 1–4.8)
LYMPHOCYTES NFR BLD: 33.1 % (ref 18–48)
MAGNESIUM SERPL-MCNC: 2.3 MG/DL (ref 1.6–2.6)
MCH RBC QN AUTO: 30.4 PG (ref 27–31)
MCHC RBC AUTO-ENTMCNC: 34.1 G/DL (ref 32–36)
MCV RBC AUTO: 89 FL (ref 82–98)
MONOCYTES # BLD AUTO: 0.8 K/UL (ref 0.3–1)
MONOCYTES NFR BLD: 8.7 % (ref 4–15)
NEUTROPHILS # BLD AUTO: 5 K/UL (ref 1.8–7.7)
NEUTROPHILS NFR BLD: 52.9 % (ref 38–73)
NRBC BLD-RTO: 0 /100 WBC
NT-PROBNP SERPL-MCNC: 13 PG/ML (ref 5–900)
PLATELET # BLD AUTO: 227 K/UL (ref 150–450)
PMV BLD AUTO: 9.5 FL (ref 9.2–12.9)
POTASSIUM SERPL-SCNC: 3.9 MMOL/L (ref 3.5–5.1)
PROT SERPL-MCNC: 7.7 G/DL (ref 6–8.4)
RBC # BLD AUTO: 4.47 M/UL (ref 4.6–6.2)
SODIUM SERPL-SCNC: 140 MMOL/L (ref 136–145)
TROPONIN I SERPL DL<=0.01 NG/ML-MCNC: 9.2 PG/ML (ref 0–60)
WBC # BLD AUTO: 9.49 K/UL (ref 3.9–12.7)

## 2022-09-23 PROCEDURE — 85025 COMPLETE CBC W/AUTO DIFF WBC: CPT | Performed by: EMERGENCY MEDICINE

## 2022-09-23 PROCEDURE — 93005 ELECTROCARDIOGRAM TRACING: CPT

## 2022-09-23 PROCEDURE — 84484 ASSAY OF TROPONIN QUANT: CPT | Performed by: EMERGENCY MEDICINE

## 2022-09-23 PROCEDURE — 63600175 PHARM REV CODE 636 W HCPCS: Performed by: EMERGENCY MEDICINE

## 2022-09-23 PROCEDURE — 80053 COMPREHEN METABOLIC PANEL: CPT | Performed by: EMERGENCY MEDICINE

## 2022-09-23 PROCEDURE — 25000003 PHARM REV CODE 250: Performed by: EMERGENCY MEDICINE

## 2022-09-23 PROCEDURE — 93010 EKG 12-LEAD: ICD-10-PCS | Mod: ,,, | Performed by: INTERNAL MEDICINE

## 2022-09-23 PROCEDURE — 99285 EMERGENCY DEPT VISIT HI MDM: CPT | Mod: 25

## 2022-09-23 PROCEDURE — 83735 ASSAY OF MAGNESIUM: CPT | Performed by: EMERGENCY MEDICINE

## 2022-09-23 PROCEDURE — 96375 TX/PRO/DX INJ NEW DRUG ADDON: CPT

## 2022-09-23 PROCEDURE — 83880 ASSAY OF NATRIURETIC PEPTIDE: CPT | Performed by: EMERGENCY MEDICINE

## 2022-09-23 PROCEDURE — 93010 ELECTROCARDIOGRAM REPORT: CPT | Mod: ,,, | Performed by: INTERNAL MEDICINE

## 2022-09-23 PROCEDURE — 96374 THER/PROPH/DIAG INJ IV PUSH: CPT

## 2022-09-23 RX ORDER — ASPIRIN 325 MG
325 TABLET ORAL
Status: COMPLETED | OUTPATIENT
Start: 2022-09-23 | End: 2022-09-23

## 2022-09-23 RX ORDER — SUCRALFATE 1 G/1
1 TABLET ORAL
Qty: 28 TABLET | Refills: 0 | OUTPATIENT
Start: 2022-09-23 | End: 2022-11-21

## 2022-09-23 RX ORDER — KETOROLAC TROMETHAMINE 30 MG/ML
30 INJECTION, SOLUTION INTRAMUSCULAR; INTRAVENOUS
Status: COMPLETED | OUTPATIENT
Start: 2022-09-23 | End: 2022-09-23

## 2022-09-23 RX ORDER — MAG HYDROX/ALUMINUM HYD/SIMETH 200-200-20
30 SUSPENSION, ORAL (FINAL DOSE FORM) ORAL ONCE
Status: COMPLETED | OUTPATIENT
Start: 2022-09-23 | End: 2022-09-23

## 2022-09-23 RX ORDER — LIDOCAINE HYDROCHLORIDE 20 MG/ML
15 SOLUTION OROPHARYNGEAL ONCE
Status: COMPLETED | OUTPATIENT
Start: 2022-09-23 | End: 2022-09-23

## 2022-09-23 RX ORDER — ONDANSETRON 2 MG/ML
8 INJECTION INTRAMUSCULAR; INTRAVENOUS
Status: COMPLETED | OUTPATIENT
Start: 2022-09-23 | End: 2022-09-23

## 2022-09-23 RX ORDER — OMEPRAZOLE 20 MG/1
40 CAPSULE, DELAYED RELEASE ORAL DAILY
Qty: 60 CAPSULE | Refills: 0 | OUTPATIENT
Start: 2022-09-23 | End: 2022-11-21

## 2022-09-23 RX ADMIN — KETOROLAC TROMETHAMINE 30 MG: 30 INJECTION, SOLUTION INTRAMUSCULAR; INTRAVENOUS at 03:09

## 2022-09-23 RX ADMIN — ASPIRIN 325 MG ORAL TABLET 325 MG: 325 PILL ORAL at 04:09

## 2022-09-23 RX ADMIN — ALUMINUM HYDROXIDE, MAGNESIUM HYDROXIDE, AND DIMETHICONE 30 ML: 200; 20; 200 SUSPENSION ORAL at 04:09

## 2022-09-23 RX ADMIN — NITROGLYCERIN 1 INCH: 20 OINTMENT TOPICAL at 04:09

## 2022-09-23 RX ADMIN — LIDOCAINE HYDROCHLORIDE 15 ML: 20 SOLUTION ORAL at 04:09

## 2022-09-23 RX ADMIN — ONDANSETRON 8 MG: 2 INJECTION INTRAMUSCULAR; INTRAVENOUS at 03:09
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
## 2022-11-21 ENCOUNTER — HOSPITAL ENCOUNTER (EMERGENCY)
Facility: HOSPITAL | Age: 57
Discharge: HOME | End: 2022-11-21
Attending: EMERGENCY MEDICINE
Payer: MEDICAID

## 2022-11-21 VITALS
SYSTOLIC BLOOD PRESSURE: 123 MMHG | HEIGHT: 71 IN | RESPIRATION RATE: 20 BRPM | HEART RATE: 62 BPM | TEMPERATURE: 98 F | DIASTOLIC BLOOD PRESSURE: 76 MMHG | BODY MASS INDEX: 27.27 KG/M2 | OXYGEN SATURATION: 97 % | WEIGHT: 194.81 LBS

## 2022-11-21 DIAGNOSIS — R07.9 CHEST PAIN: ICD-10-CM

## 2022-11-21 DIAGNOSIS — R07.89 ATYPICAL CHEST PAIN: Primary | ICD-10-CM

## 2022-11-21 DIAGNOSIS — K29.70 GASTRITIS, PRESENCE OF BLEEDING UNSPECIFIED, UNSPECIFIED CHRONICITY, UNSPECIFIED GASTRITIS TYPE: ICD-10-CM

## 2022-11-21 LAB
ALBUMIN SERPL BCP-MCNC: 4 G/DL (ref 3.5–5.2)
ALP SERPL-CCNC: 151 U/L (ref 55–135)
ALT SERPL W/O P-5'-P-CCNC: 36 U/L (ref 10–44)
ANION GAP SERPL CALC-SCNC: 5 MMOL/L (ref 8–16)
AST SERPL-CCNC: 21 U/L (ref 10–40)
BASOPHILS # BLD AUTO: 0.03 K/UL (ref 0–0.2)
BASOPHILS NFR BLD: 0.3 % (ref 0–1.9)
BILIRUB SERPL-MCNC: 0.2 MG/DL (ref 0.1–1)
BUN SERPL-MCNC: 12 MG/DL (ref 6–20)
CALCIUM SERPL-MCNC: 8.4 MG/DL (ref 8.7–10.5)
CHLORIDE SERPL-SCNC: 107 MMOL/L (ref 95–110)
CO2 SERPL-SCNC: 28 MMOL/L (ref 23–29)
CREAT SERPL-MCNC: 0.6 MG/DL (ref 0.5–1.4)
DIFFERENTIAL METHOD: ABNORMAL
EOSINOPHIL # BLD AUTO: 0.4 K/UL (ref 0–0.5)
EOSINOPHIL NFR BLD: 3.7 % (ref 0–8)
ERYTHROCYTE [DISTWIDTH] IN BLOOD BY AUTOMATED COUNT: 11.7 % (ref 11.5–14.5)
EST. GFR  (NO RACE VARIABLE): >60 ML/MIN/1.73 M^2
GLUCOSE SERPL-MCNC: 129 MG/DL (ref 70–110)
HCT VFR BLD AUTO: 39.3 % (ref 40–54)
HGB BLD-MCNC: 13.2 G/DL (ref 14–18)
IMM GRANULOCYTES # BLD AUTO: 0.02 K/UL (ref 0–0.04)
IMM GRANULOCYTES NFR BLD AUTO: 0.2 % (ref 0–0.5)
LYMPHOCYTES # BLD AUTO: 2.6 K/UL (ref 1–4.8)
LYMPHOCYTES NFR BLD: 23.9 % (ref 18–48)
MCH RBC QN AUTO: 30.1 PG (ref 27–31)
MCHC RBC AUTO-ENTMCNC: 33.6 G/DL (ref 32–36)
MCV RBC AUTO: 90 FL (ref 82–98)
MONOCYTES # BLD AUTO: 0.7 K/UL (ref 0.3–1)
MONOCYTES NFR BLD: 6.4 % (ref 4–15)
NEUTROPHILS # BLD AUTO: 7.2 K/UL (ref 1.8–7.7)
NEUTROPHILS NFR BLD: 65.5 % (ref 38–73)
NRBC BLD-RTO: 0 /100 WBC
NT-PROBNP SERPL-MCNC: 15 PG/ML (ref 5–900)
PLATELET # BLD AUTO: 185 K/UL (ref 150–450)
PMV BLD AUTO: 9.6 FL (ref 9.2–12.9)
POTASSIUM SERPL-SCNC: 4 MMOL/L (ref 3.5–5.1)
PROT SERPL-MCNC: 7.8 G/DL (ref 6–8.4)
RBC # BLD AUTO: 4.38 M/UL (ref 4.6–6.2)
SODIUM SERPL-SCNC: 140 MMOL/L (ref 136–145)
TROPONIN I SERPL DL<=0.01 NG/ML-MCNC: 9.3 PG/ML (ref 0–60)
WBC # BLD AUTO: 11.04 K/UL (ref 3.9–12.7)

## 2022-11-21 PROCEDURE — 93010 ELECTROCARDIOGRAM REPORT: CPT | Mod: ,,, | Performed by: INTERNAL MEDICINE

## 2022-11-21 PROCEDURE — 80053 COMPREHEN METABOLIC PANEL: CPT | Performed by: EMERGENCY MEDICINE

## 2022-11-21 PROCEDURE — 99285 EMERGENCY DEPT VISIT HI MDM: CPT | Mod: 25

## 2022-11-21 PROCEDURE — 93010 EKG 12-LEAD: ICD-10-PCS | Mod: ,,, | Performed by: INTERNAL MEDICINE

## 2022-11-21 PROCEDURE — 96374 THER/PROPH/DIAG INJ IV PUSH: CPT

## 2022-11-21 PROCEDURE — 83880 ASSAY OF NATRIURETIC PEPTIDE: CPT | Performed by: EMERGENCY MEDICINE

## 2022-11-21 PROCEDURE — 84484 ASSAY OF TROPONIN QUANT: CPT | Performed by: EMERGENCY MEDICINE

## 2022-11-21 PROCEDURE — 25000003 PHARM REV CODE 250: Performed by: EMERGENCY MEDICINE

## 2022-11-21 PROCEDURE — 85025 COMPLETE CBC W/AUTO DIFF WBC: CPT | Performed by: EMERGENCY MEDICINE

## 2022-11-21 PROCEDURE — 36415 COLL VENOUS BLD VENIPUNCTURE: CPT | Performed by: EMERGENCY MEDICINE

## 2022-11-21 PROCEDURE — 93005 ELECTROCARDIOGRAM TRACING: CPT

## 2022-11-21 PROCEDURE — 63600175 PHARM REV CODE 636 W HCPCS: Performed by: EMERGENCY MEDICINE

## 2022-11-21 RX ORDER — MAG HYDROX/ALUMINUM HYD/SIMETH 200-200-20
30 SUSPENSION, ORAL (FINAL DOSE FORM) ORAL ONCE
Status: DISCONTINUED | OUTPATIENT
Start: 2022-11-21 | End: 2022-11-21

## 2022-11-21 RX ORDER — SUCRALFATE 1 G/1
1 TABLET ORAL
Qty: 28 TABLET | Refills: 0 | Status: SHIPPED | OUTPATIENT
Start: 2022-11-21 | End: 2022-11-28

## 2022-11-21 RX ORDER — LIDOCAINE HYDROCHLORIDE 20 MG/ML
15 SOLUTION OROPHARYNGEAL ONCE
Status: DISCONTINUED | OUTPATIENT
Start: 2022-11-21 | End: 2022-11-21

## 2022-11-21 RX ORDER — KETOROLAC TROMETHAMINE 30 MG/ML
30 INJECTION, SOLUTION INTRAMUSCULAR; INTRAVENOUS
Status: DISCONTINUED | OUTPATIENT
Start: 2022-11-21 | End: 2022-11-21

## 2022-11-21 RX ORDER — KETOROLAC TROMETHAMINE 30 MG/ML
30 INJECTION, SOLUTION INTRAMUSCULAR; INTRAVENOUS
Status: COMPLETED | OUTPATIENT
Start: 2022-11-21 | End: 2022-11-21

## 2022-11-21 RX ORDER — FAMOTIDINE 20 MG/1
20 TABLET, FILM COATED ORAL 2 TIMES DAILY
Qty: 28 TABLET | Refills: 0 | Status: SHIPPED | OUTPATIENT
Start: 2022-11-21 | End: 2022-12-05

## 2022-11-21 RX ORDER — MAG HYDROX/ALUMINUM HYD/SIMETH 200-200-20
30 SUSPENSION, ORAL (FINAL DOSE FORM) ORAL ONCE
Status: COMPLETED | OUTPATIENT
Start: 2022-11-21 | End: 2022-11-21

## 2022-11-21 RX ORDER — LIDOCAINE HYDROCHLORIDE 20 MG/ML
15 SOLUTION OROPHARYNGEAL ONCE
Status: COMPLETED | OUTPATIENT
Start: 2022-11-21 | End: 2022-11-21

## 2022-11-21 RX ORDER — ASPIRIN 325 MG
325 TABLET ORAL
Status: COMPLETED | OUTPATIENT
Start: 2022-11-21 | End: 2022-11-21

## 2022-11-21 RX ORDER — PANTOPRAZOLE SODIUM 20 MG/1
20 TABLET, DELAYED RELEASE ORAL
Qty: 28 TABLET | Refills: 0 | Status: SHIPPED | OUTPATIENT
Start: 2022-11-21 | End: 2022-12-05

## 2022-11-21 RX ADMIN — LIDOCAINE HYDROCHLORIDE 15 ML: 20 SOLUTION ORAL at 07:11

## 2022-11-21 RX ADMIN — ASPIRIN 325 MG ORAL TABLET 325 MG: 325 PILL ORAL at 06:11

## 2022-11-21 RX ADMIN — KETOROLAC TROMETHAMINE 30 MG: 30 INJECTION, SOLUTION INTRAMUSCULAR at 07:11

## 2022-11-21 RX ADMIN — ALUMINUM HYDROXIDE, MAGNESIUM HYDROXIDE, AND SIMETHICONE 30 ML: 200; 200; 20 SUSPENSION ORAL at 07:11
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
## 2022-11-22 NOTE — DISCHARGE INSTRUCTIONS
Take OTC Mylanta Max for indigestion. Read all discharge instructions/education provided: follow all recommendations and return precautions.  Take all medications as prescribed.  Follow up with your primary care doctor and/or specialist as directed.  Return to emergency department immediately for any new or worsening or recurrent symptoms.

## 2022-11-25 ENCOUNTER — HOSPITAL ENCOUNTER (EMERGENCY)
Facility: HOSPITAL | Age: 57
Discharge: HOME | End: 2022-11-25
Attending: STUDENT IN AN ORGANIZED HEALTH CARE EDUCATION/TRAINING PROGRAM
Payer: MEDICAID

## 2022-11-25 VITALS
DIASTOLIC BLOOD PRESSURE: 89 MMHG | TEMPERATURE: 99 F | HEART RATE: 76 BPM | RESPIRATION RATE: 18 BRPM | HEIGHT: 71 IN | OXYGEN SATURATION: 96 % | SYSTOLIC BLOOD PRESSURE: 139 MMHG | WEIGHT: 188 LBS | BODY MASS INDEX: 26.32 KG/M2

## 2022-11-25 DIAGNOSIS — R07.9 CHEST PAIN: ICD-10-CM

## 2022-11-25 DIAGNOSIS — R07.89 ATYPICAL CHEST PAIN: Primary | ICD-10-CM

## 2022-11-25 DIAGNOSIS — K21.9 GASTROESOPHAGEAL REFLUX DISEASE, UNSPECIFIED WHETHER ESOPHAGITIS PRESENT: ICD-10-CM

## 2022-11-25 PROCEDURE — 99283 EMERGENCY DEPT VISIT LOW MDM: CPT | Mod: 25

## 2022-11-25 PROCEDURE — 93005 ELECTROCARDIOGRAM TRACING: CPT

## 2022-11-25 PROCEDURE — 93010 ELECTROCARDIOGRAM REPORT: CPT | Mod: ,,, | Performed by: INTERNAL MEDICINE

## 2022-11-25 PROCEDURE — 93010 EKG 12-LEAD: ICD-10-PCS | Mod: ,,, | Performed by: INTERNAL MEDICINE

## 2022-11-25 PROCEDURE — 25000003 PHARM REV CODE 250: Performed by: STUDENT IN AN ORGANIZED HEALTH CARE EDUCATION/TRAINING PROGRAM

## 2022-11-25 RX ORDER — LIDOCAINE HYDROCHLORIDE 20 MG/ML
15 SOLUTION OROPHARYNGEAL ONCE
Status: COMPLETED | OUTPATIENT
Start: 2022-11-25 | End: 2022-11-25

## 2022-11-25 RX ORDER — MAG HYDROX/ALUMINUM HYD/SIMETH 200-200-20
30 SUSPENSION, ORAL (FINAL DOSE FORM) ORAL ONCE
Status: COMPLETED | OUTPATIENT
Start: 2022-11-25 | End: 2022-11-25

## 2022-11-25 RX ADMIN — LIDOCAINE HYDROCHLORIDE 15 ML: 20 SOLUTION ORAL at 04:11

## 2022-11-25 RX ADMIN — ALUMINUM HYDROXIDE, MAGNESIUM HYDROXIDE, AND SIMETHICONE 30 ML: 200; 200; 20 SUSPENSION ORAL at 04:11
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
## 2022-12-13 ENCOUNTER — OFFICE VISIT (OUTPATIENT)
Dept: ORTHOPEDICS | Facility: CLINIC | Age: 57
End: 2022-12-13
Payer: MEDICAID

## 2022-12-13 DIAGNOSIS — M25.561 CHRONIC PAIN OF BOTH KNEES: Primary | ICD-10-CM

## 2022-12-13 DIAGNOSIS — M25.562 CHRONIC PAIN OF BOTH KNEES: Primary | ICD-10-CM

## 2022-12-13 DIAGNOSIS — G89.29 CHRONIC PAIN OF BOTH KNEES: Primary | ICD-10-CM

## 2022-12-13 DIAGNOSIS — M17.0 PRIMARY OSTEOARTHRITIS OF BOTH KNEES: ICD-10-CM

## 2022-12-13 PROCEDURE — 20610 LARGE JOINT ASPIRATION/INJECTION: L KNEE: ICD-10-PCS | Mod: S$PBB,LT,, | Performed by: NURSE PRACTITIONER

## 2022-12-13 PROCEDURE — 99213 OFFICE O/P EST LOW 20 MIN: CPT | Mod: 25,S$PBB,, | Performed by: NURSE PRACTITIONER

## 2022-12-13 PROCEDURE — 99999 PR PBB SHADOW E&M-EST. PATIENT-LVL III: ICD-10-PCS | Mod: PBBFAC,,, | Performed by: NURSE PRACTITIONER

## 2022-12-13 PROCEDURE — 1159F PR MEDICATION LIST DOCUMENTED IN MEDICAL RECORD: ICD-10-PCS | Mod: CPTII,,, | Performed by: NURSE PRACTITIONER

## 2022-12-13 PROCEDURE — 99213 OFFICE O/P EST LOW 20 MIN: CPT | Mod: PBBFAC,25 | Performed by: NURSE PRACTITIONER

## 2022-12-13 PROCEDURE — 1160F PR REVIEW ALL MEDS BY PRESCRIBER/CLIN PHARMACIST DOCUMENTED: ICD-10-PCS | Mod: CPTII,,, | Performed by: NURSE PRACTITIONER

## 2022-12-13 PROCEDURE — 99213 PR OFFICE/OUTPT VISIT, EST, LEVL III, 20-29 MIN: ICD-10-PCS | Mod: 25,S$PBB,, | Performed by: NURSE PRACTITIONER

## 2022-12-13 PROCEDURE — 1159F MED LIST DOCD IN RCRD: CPT | Mod: CPTII,,, | Performed by: NURSE PRACTITIONER

## 2022-12-13 PROCEDURE — 1160F RVW MEDS BY RX/DR IN RCRD: CPT | Mod: CPTII,,, | Performed by: NURSE PRACTITIONER

## 2022-12-13 PROCEDURE — 20610 DRAIN/INJ JOINT/BURSA W/O US: CPT | Mod: S$PBB,LT,, | Performed by: NURSE PRACTITIONER

## 2022-12-13 PROCEDURE — 99999 PR PBB SHADOW E&M-EST. PATIENT-LVL III: CPT | Mod: PBBFAC,,, | Performed by: NURSE PRACTITIONER

## 2022-12-13 PROCEDURE — 20610 DRAIN/INJ JOINT/BURSA W/O US: CPT | Mod: PBBFAC,LT | Performed by: NURSE PRACTITIONER

## 2022-12-13 RX ORDER — BETAMETHASONE SODIUM PHOSPHATE AND BETAMETHASONE ACETATE 3; 3 MG/ML; MG/ML
6 INJECTION, SUSPENSION INTRA-ARTICULAR; INTRALESIONAL; INTRAMUSCULAR; SOFT TISSUE
Status: DISCONTINUED | OUTPATIENT
Start: 2022-12-13 | End: 2022-12-13 | Stop reason: HOSPADM

## 2022-12-13 RX ADMIN — BETAMETHASONE SODIUM PHOSPHATE AND BETAMETHASONE ACETATE 6 MG: 3; 3 INJECTION, SUSPENSION INTRA-ARTICULAR; INTRALESIONAL; INTRAMUSCULAR; SOFT TISSUE at 02:12

## 2022-12-13 NOTE — PROCEDURES
Large Joint Aspiration/Injection: L knee    Date/Time: 12/13/2022 2:15 PM  Performed by: Mtahew Valdes NP  Authorized by: Mathew Valdes NP     Consent Done?:  Yes (Written)  Indications:  Arthritis  Site marked: the procedure site was marked    Timeout: prior to procedure the correct patient, procedure, and site was verified    Prep: patient was prepped and draped in usual sterile fashion      Local anesthesia used?: Yes    Local anesthetic:  Lidocaine 1% without epinephrine    Details:  Needle Size:  22 G  Ultrasonic Guidance for needle placement?: No    Approach:  Anterolateral  Location:  Knee  Site:  L knee  Medications:  6 mg betamethasone acetate-betamethasone sodium phosphate 6 mg/mL  Aspirate amount (mL):  20  Aspirate:  Clear  Patient tolerance:  Patient tolerated the procedure well with no immediate complications     Risks reviewed: pain, swelling, infection and nerve or tendon injury.     LILY Bernard MA in room

## 2022-12-13 NOTE — PROGRESS NOTES
Subjective:      Follow up: Left knee pain      Arnulfo Fay is a 57 y.o. male returns for follow up for chronic bilateral knee pain. He presents and rates his pain as 9/10 in the left knee. He states that the steroid injection had helped a lot in both knees. He states that the swelling and pain has returned in the left knee. Symptoms worsen with activity, getting up from a chair, weight bearing, sitting for prolonged periods of time.  He is noted with a limp. He states that the right knee is doing well.            Review of Systems   Constitutional: Negative.  Negative for fever.   Musculoskeletal:  Positive for joint pain.   Skin: Negative.    Neurological: Negative.    Psychiatric/Behavioral: Negative.     All other systems reviewed and are negative.     Objective:      NVI  General :   alert, appears stated age, and cooperative   Gait: Antalgic   Bilateral Lower Extremity  Hip Palpation:  no tenderness over the greater trochanters   Hip ROM: 100% of normal    Knee Effusion:  1+ left knee   Ecchymosis:  none   Knee ROM:  5 to 110 degrees with subpatellar crepitance.   Patella:  Patella does track normally.  Patellar apprehension test: negative  Patellar compression test: negative   Tenderness: medial joint line left knee   Stability:  Lachman's test: negative  Posterior drawer: negative  Medial collateral ligament: negative  Lateral collateral ligament: negative     Dominique Test:  negative   Melody's Test:  Present in left knee   Sensation:   intact to light touch   Pulses: normal DP and PT pulses.    Mild quad weakness noted.   Brisk cap refill.      Imaging  None today  Assessment:      Bilateral knee pain, DJD, chondrocalcinosis     Plan:      Repeat left knee intra-articular steroid injection done, see procedure note.  Continue home physician directed exercises.   Ice and Nsaid as needed. Limit Stairs and squatting.   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    Follow up: prn   He had no further questions.     PROCEDURE: Left knee intra-articular steroid injection:  I have explained the risks, benefits, and alternatives of the procedure in detail.  The patient voices understanding and all questions have been answered.  The patient agrees to proceed as planned.      So after I performed a sterile prep of the skin in the normal fashion, the left knee is injected using a 22 gauge needle from the anterolateral approach with a combination of 3cc 1% plain lidocaine, 0.5% marcaine and 6mg of celestone .       The patient is cautioned and immediate relief of pain is secondary to the local anesthetic and will be temporary.  After the anesthetic wears off there may be a increase in pain that may last for a few hours or a few days and they should use ice to help alleviate this flair up of pain.

## 2022-12-18 ENCOUNTER — HOSPITAL ENCOUNTER (EMERGENCY)
Facility: HOSPITAL | Age: 57
Discharge: HOME | End: 2022-12-19
Attending: EMERGENCY MEDICINE
Payer: MEDICAID

## 2022-12-18 VITALS
SYSTOLIC BLOOD PRESSURE: 102 MMHG | WEIGHT: 185.38 LBS | DIASTOLIC BLOOD PRESSURE: 70 MMHG | BODY MASS INDEX: 25.86 KG/M2 | TEMPERATURE: 100 F | RESPIRATION RATE: 16 BRPM | HEART RATE: 95 BPM | OXYGEN SATURATION: 98 %

## 2022-12-18 DIAGNOSIS — M25.559 HIP PAIN: Primary | ICD-10-CM

## 2022-12-18 PROCEDURE — 96372 THER/PROPH/DIAG INJ SC/IM: CPT | Performed by: EMERGENCY MEDICINE

## 2022-12-18 PROCEDURE — 25000003 PHARM REV CODE 250: Performed by: EMERGENCY MEDICINE

## 2022-12-18 PROCEDURE — 99284 EMERGENCY DEPT VISIT MOD MDM: CPT | Mod: 25

## 2022-12-18 PROCEDURE — 63600175 PHARM REV CODE 636 W HCPCS: Performed by: EMERGENCY MEDICINE

## 2022-12-18 RX ORDER — KETOROLAC TROMETHAMINE 30 MG/ML
15 INJECTION, SOLUTION INTRAMUSCULAR; INTRAVENOUS
Status: COMPLETED | OUTPATIENT
Start: 2022-12-18 | End: 2022-12-18

## 2022-12-18 RX ORDER — HYDROCODONE BITARTRATE AND ACETAMINOPHEN 5; 325 MG/1; MG/1
1 TABLET ORAL
Status: COMPLETED | OUTPATIENT
Start: 2022-12-18 | End: 2022-12-18

## 2022-12-18 RX ADMIN — HYDROCODONE BITARTRATE AND ACETAMINOPHEN 1 TABLET: 5; 325 TABLET ORAL at 11:12

## 2022-12-18 RX ADMIN — KETOROLAC TROMETHAMINE 15 MG: 30 INJECTION, SOLUTION INTRAMUSCULAR at 11:12
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
## 2022-12-22 ENCOUNTER — OFFICE VISIT (OUTPATIENT)
Dept: ORTHOPEDICS | Facility: CLINIC | Age: 57
End: 2022-12-22
Payer: MEDICAID

## 2022-12-22 DIAGNOSIS — M17.0 PRIMARY OSTEOARTHRITIS OF BOTH KNEES: Primary | ICD-10-CM

## 2022-12-22 DIAGNOSIS — M17.12 PRIMARY OSTEOARTHRITIS OF LEFT KNEE: ICD-10-CM

## 2022-12-22 DIAGNOSIS — M25.562 ACUTE PAIN OF LEFT KNEE: ICD-10-CM

## 2022-12-22 DIAGNOSIS — M25.462 KNEE EFFUSION, LEFT: ICD-10-CM

## 2022-12-22 PROCEDURE — 99999 PR PBB SHADOW E&M-EST. PATIENT-LVL III: ICD-10-PCS | Mod: PBBFAC,,, | Performed by: NURSE PRACTITIONER

## 2022-12-22 PROCEDURE — 99999 PR PBB SHADOW E&M-EST. PATIENT-LVL III: CPT | Mod: PBBFAC,,, | Performed by: NURSE PRACTITIONER

## 2022-12-22 PROCEDURE — 1160F RVW MEDS BY RX/DR IN RCRD: CPT | Mod: CPTII,,, | Performed by: NURSE PRACTITIONER

## 2022-12-22 PROCEDURE — 1159F PR MEDICATION LIST DOCUMENTED IN MEDICAL RECORD: ICD-10-PCS | Mod: CPTII,,, | Performed by: NURSE PRACTITIONER

## 2022-12-22 PROCEDURE — 99213 PR OFFICE/OUTPT VISIT, EST, LEVL III, 20-29 MIN: ICD-10-PCS | Mod: S$PBB,,, | Performed by: NURSE PRACTITIONER

## 2022-12-22 PROCEDURE — 99213 OFFICE O/P EST LOW 20 MIN: CPT | Mod: PBBFAC | Performed by: NURSE PRACTITIONER

## 2022-12-22 PROCEDURE — 99213 OFFICE O/P EST LOW 20 MIN: CPT | Mod: S$PBB,,, | Performed by: NURSE PRACTITIONER

## 2022-12-22 PROCEDURE — 1159F MED LIST DOCD IN RCRD: CPT | Mod: CPTII,,, | Performed by: NURSE PRACTITIONER

## 2022-12-22 PROCEDURE — 1160F PR REVIEW ALL MEDS BY PRESCRIBER/CLIN PHARMACIST DOCUMENTED: ICD-10-PCS | Mod: CPTII,,, | Performed by: NURSE PRACTITIONER

## 2022-12-22 RX ORDER — HYDROCODONE BITARTRATE AND ACETAMINOPHEN 5; 325 MG/1; MG/1
1 TABLET ORAL EVERY 6 HOURS PRN
Qty: 20 TABLET | Refills: 0 | Status: SHIPPED | OUTPATIENT
Start: 2022-12-22

## 2022-12-22 NOTE — PROGRESS NOTES
Subjective:      Follow up: Left knee pain      Arnulfo Fay is a 57 y.o. male returns for follow up for left knee pain, swelling and decreased ROM. He presents and states that he only had very short term relief post steroid injection last visit. He states that the pain and swelling has returned. He rates his pain as 9/10 in the left knee. He is wearing his brace and using a crutch. He states that he went to the ER on 12/18/22 due to sciatic symptoms and left hip pain. Radiographs of the hip showed mild DJD. He is noted with a limp.  He has had greater than 3 months conservative treatment. He states that the knee is giving way and is unable to fully bend the knee.  Symptoms worsen with activity, getting up from a chair, weight bearing, sitting for prolonged periods of time.           Review of Systems   Constitutional: Negative.  Negative for fever.   Musculoskeletal:  Positive for joint pain.   Skin: Negative.    Neurological: Negative.    Psychiatric/Behavioral: Negative.     All other systems reviewed and are negative.      Objective:      NVI  General :   alert, appears stated age, and cooperative   Gait: Antalgic   Bilateral Lower Extremity  Hip Palpation:  no tenderness over the greater trochanters   Hip ROM: 100% of normal    Knee Effusion:  1+ left knee   Ecchymosis:  none   Knee ROM:  5 to 105 degrees with subpatellar crepitance.   Patella:  Patella does track normally.  Patellar apprehension test: negative  Patellar compression test: negative   Tenderness: medial joint line left knee   Stability:  Lachman's test: negative  Posterior drawer: negative  Medial collateral ligament: negative  Lateral collateral ligament: negative     Dominique Test:  negative   Melody's Test:  Present in left knee- medial   Sensation:   intact to light touch   Pulses: normal DP and PT pulses.    Mild quad weakness noted.   Brisk cap refill.      Imaging  None today  Assessment:      Left knee pain, DJD, chondrocalcinosis      Plan:      MRI left knee to evaluate for internal derangement. He has instability, effusion and decreased ROM.   He has had greater than 6 weeks conservative treatment.   Continue home physician directed exercises.   Ice and Nsaid as previous. Limit Stairs and squatting.   Norco 5/325 mg  prn pain as directed. Reviewed side effects and dosing.   Continue crossfire [employer]kin knee brace LT knee for joint support and compression.   Follow up: post MRI  He had no further questions.

## 2022-12-29 ENCOUNTER — HOSPITAL ENCOUNTER (OUTPATIENT)
Dept: RADIOLOGY | Facility: HOSPITAL | Age: 57
Discharge: HOME | End: 2022-12-29
Attending: NURSE PRACTITIONER
Payer: MEDICAID

## 2022-12-29 DIAGNOSIS — M25.462 KNEE EFFUSION, LEFT: ICD-10-CM

## 2022-12-29 DIAGNOSIS — M17.12 PRIMARY OSTEOARTHRITIS OF LEFT KNEE: ICD-10-CM

## 2022-12-29 PROCEDURE — 73721 MRI JNT OF LWR EXTRE W/O DYE: CPT | Mod: TC,LT

## 2023-01-03 ENCOUNTER — OFFICE VISIT (OUTPATIENT)
Dept: ORTHOPEDICS | Facility: CLINIC | Age: 58
End: 2023-01-03
Payer: MEDICAID

## 2023-01-03 DIAGNOSIS — M17.12 PRIMARY OSTEOARTHRITIS OF LEFT KNEE: Primary | ICD-10-CM

## 2023-01-03 DIAGNOSIS — M25.462 KNEE EFFUSION, LEFT: ICD-10-CM

## 2023-01-03 DIAGNOSIS — S83.242D ACUTE MEDIAL MENISCAL TEAR, LEFT, SUBSEQUENT ENCOUNTER: ICD-10-CM

## 2023-01-03 DIAGNOSIS — S83.282A OTHER TEAR OF LATERAL MENISCUS OF LEFT KNEE AS CURRENT INJURY, INITIAL ENCOUNTER: ICD-10-CM

## 2023-01-03 PROCEDURE — 99213 OFFICE O/P EST LOW 20 MIN: CPT | Mod: PBBFAC | Performed by: NURSE PRACTITIONER

## 2023-01-03 PROCEDURE — 1160F PR REVIEW ALL MEDS BY PRESCRIBER/CLIN PHARMACIST DOCUMENTED: ICD-10-PCS | Mod: CPTII,,, | Performed by: NURSE PRACTITIONER

## 2023-01-03 PROCEDURE — 99999 PR PBB SHADOW E&M-EST. PATIENT-LVL III: ICD-10-PCS | Mod: PBBFAC,,, | Performed by: NURSE PRACTITIONER

## 2023-01-03 PROCEDURE — 1159F PR MEDICATION LIST DOCUMENTED IN MEDICAL RECORD: ICD-10-PCS | Mod: CPTII,,, | Performed by: NURSE PRACTITIONER

## 2023-01-03 PROCEDURE — 99213 PR OFFICE/OUTPT VISIT, EST, LEVL III, 20-29 MIN: ICD-10-PCS | Mod: S$PBB,,, | Performed by: NURSE PRACTITIONER

## 2023-01-03 PROCEDURE — 1160F RVW MEDS BY RX/DR IN RCRD: CPT | Mod: CPTII,,, | Performed by: NURSE PRACTITIONER

## 2023-01-03 PROCEDURE — 99213 OFFICE O/P EST LOW 20 MIN: CPT | Mod: S$PBB,,, | Performed by: NURSE PRACTITIONER

## 2023-01-03 PROCEDURE — 1159F MED LIST DOCD IN RCRD: CPT | Mod: CPTII,,, | Performed by: NURSE PRACTITIONER

## 2023-01-03 PROCEDURE — 99999 PR PBB SHADOW E&M-EST. PATIENT-LVL III: CPT | Mod: PBBFAC,,, | Performed by: NURSE PRACTITIONER

## 2023-01-03 NOTE — PROGRESS NOTES
Subjective:      Follow up: Left knee MRI      Arnulfo Fay is a 57 y.o. male returns for follow up for left knee pain, swelling and decreased ROM. He presents for MRI review. He rates his pain as 6/10 in the left knee. He is wearing his brace. He states that the pain has decreased some since our last visit. He is noted with a mild limp. Symptoms worsen with activity, getting up from a chair, weight bearing, sitting for prolonged periods of time.           Review of Systems   Constitutional: Negative.  Negative for fever.   Musculoskeletal:  Positive for joint pain.   Skin: Negative.    Neurological: Negative.    Psychiatric/Behavioral: Negative.     All other systems reviewed and are negative.      Objective:      NVI  General :   alert, appears stated age, and cooperative   Gait: Antalgic- mild   Left Lower Extremity  Hip Palpation:  no tenderness over the greater trochanters   Hip ROM: 100% of normal    Knee Effusion:  0-1+ left knee   Ecchymosis:  none   Knee ROM:  5 to 110 degrees with subpatellar crepitance.   Patella:  Patella does track normally.  Patellar apprehension test: negative  Patellar compression test: negative   Tenderness: medial joint line   Stability:  Lachman's test: negative  Posterior drawer: negative  Medial collateral ligament: negative  Lateral collateral ligament: negative     Dominique Test:  negative   Melody's Test:  Present in left knee   Sensation:   intact to light touch   Pulses: normal DP and PT pulses.    Mild quad weakness noted.   Brisk cap refill.      Imaging    MRI Left knee:   Impression:     Suspected small radial tear at the junction of the body and anterior horn of the lateral meniscus.  Thinning and abnormal contour of the medial meniscal body and posterior horn, suspicious for a complex tear.  Nonspecific marrow edema involving the proximal tibia.        Assessment:      Left knee pain, DJD, chondrocalcinosis and meniscal tears as described.     Plan:      MRI left  knee reviewed, consistent with meniscal tears as described.  Continue home physician directed exercises.   Referral to therapy for left knee modalities for ROM and strengthening. Will make referral to sports medicine if therapy fails.  Ice and Nsaid as previous. Limit Stairs and squatting.   Continue crossfire bioskin knee brace LT knee for joint support and compression.   Follow up: 6-8 weeks  He had no further questions.

## 2023-01-05 ENCOUNTER — HOSPITAL ENCOUNTER (EMERGENCY)
Facility: HOSPITAL | Age: 58
Discharge: HOME | End: 2023-01-05
Attending: STUDENT IN AN ORGANIZED HEALTH CARE EDUCATION/TRAINING PROGRAM
Payer: MEDICAID

## 2023-01-05 VITALS
RESPIRATION RATE: 16 BRPM | HEART RATE: 86 BPM | HEIGHT: 71 IN | TEMPERATURE: 98 F | OXYGEN SATURATION: 98 % | SYSTOLIC BLOOD PRESSURE: 107 MMHG | WEIGHT: 175 LBS | DIASTOLIC BLOOD PRESSURE: 75 MMHG | BODY MASS INDEX: 24.5 KG/M2

## 2023-01-05 DIAGNOSIS — M25.559 HIP PAIN: Primary | ICD-10-CM

## 2023-01-05 DIAGNOSIS — M54.9 BACK PAIN, UNSPECIFIED BACK LOCATION, UNSPECIFIED BACK PAIN LATERALITY, UNSPECIFIED CHRONICITY: ICD-10-CM

## 2023-01-05 PROCEDURE — 99284 EMERGENCY DEPT VISIT MOD MDM: CPT

## 2023-01-05 PROCEDURE — 96372 THER/PROPH/DIAG INJ SC/IM: CPT

## 2023-01-05 PROCEDURE — 63600175 PHARM REV CODE 636 W HCPCS

## 2023-01-05 RX ORDER — IBUPROFEN 600 MG/1
600 TABLET ORAL EVERY 6 HOURS PRN
Qty: 20 TABLET | Refills: 0 | OUTPATIENT
Start: 2023-01-05 | End: 2023-07-29

## 2023-01-05 RX ORDER — KETOROLAC TROMETHAMINE 30 MG/ML
30 INJECTION, SOLUTION INTRAMUSCULAR; INTRAVENOUS
Status: COMPLETED | OUTPATIENT
Start: 2023-01-05 | End: 2023-01-05

## 2023-01-05 RX ORDER — BETAMETHASONE SODIUM PHOSPHATE AND BETAMETHASONE ACETATE 3; 3 MG/ML; MG/ML
6 INJECTION, SUSPENSION INTRA-ARTICULAR; INTRALESIONAL; INTRAMUSCULAR; SOFT TISSUE
Status: DISCONTINUED | OUTPATIENT
Start: 2023-01-05 | End: 2023-01-05

## 2023-01-05 RX ORDER — ORPHENADRINE CITRATE 30 MG/ML
60 INJECTION INTRAMUSCULAR; INTRAVENOUS
Status: COMPLETED | OUTPATIENT
Start: 2023-01-05 | End: 2023-01-05

## 2023-01-05 RX ORDER — CYCLOBENZAPRINE HCL 10 MG
10 TABLET ORAL 3 TIMES DAILY PRN
Qty: 15 TABLET | Refills: 0 | Status: SHIPPED | OUTPATIENT
Start: 2023-01-05 | End: 2023-01-10

## 2023-01-05 RX ORDER — MUPIROCIN 20 MG/G
1 OINTMENT TOPICAL
Status: DISCONTINUED | OUTPATIENT
Start: 2023-01-05 | End: 2023-01-05

## 2023-01-05 RX ORDER — DEXAMETHASONE SODIUM PHOSPHATE 4 MG/ML
8 INJECTION, SOLUTION INTRA-ARTICULAR; INTRALESIONAL; INTRAMUSCULAR; INTRAVENOUS; SOFT TISSUE
Status: COMPLETED | OUTPATIENT
Start: 2023-01-05 | End: 2023-01-05

## 2023-01-05 RX ADMIN — KETOROLAC TROMETHAMINE 30 MG: 30 INJECTION, SOLUTION INTRAMUSCULAR at 12:01

## 2023-01-05 RX ADMIN — ORPHENADRINE CITRATE 60 MG: 30 INJECTION INTRAMUSCULAR; INTRAVENOUS at 11:01

## 2023-01-05 RX ADMIN — DEXAMETHASONE SODIUM PHOSPHATE 8 MG: 4 INJECTION, SOLUTION INTRA-ARTICULAR; INTRALESIONAL; INTRAMUSCULAR; INTRAVENOUS; SOFT TISSUE at 12:01

## 2023-01-05 NOTE — Clinical Note
"Arnulfo"Arnulfo" Sumeet was seen and treated in our emergency department on 1/5/2023.  He may return to work on 01/09/2023.       If you have any questions or concerns, please don't hesitate to call.      Fredo Gordillo MD"
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
## 2023-01-11 ENCOUNTER — CLINICAL SUPPORT (OUTPATIENT)
Dept: REHABILITATION | Facility: HOSPITAL | Age: 58
End: 2023-01-11
Payer: MEDICAID

## 2023-01-11 DIAGNOSIS — S83.282A OTHER TEAR OF LATERAL MENISCUS OF LEFT KNEE AS CURRENT INJURY, INITIAL ENCOUNTER: ICD-10-CM

## 2023-01-11 DIAGNOSIS — S83.242D ACUTE MEDIAL MENISCAL TEAR, LEFT, SUBSEQUENT ENCOUNTER: ICD-10-CM

## 2023-01-11 DIAGNOSIS — M17.12 PRIMARY OSTEOARTHRITIS OF LEFT KNEE: ICD-10-CM

## 2023-01-11 PROCEDURE — 97161 PT EVAL LOW COMPLEX 20 MIN: CPT

## 2023-01-11 NOTE — PLAN OF CARE
Physical Therapy Initial Evaluation     Name: Arnulfo Fay  [facility] Number: [id number]    Arnulfo is a 57 y.o. male evaluated on 01/11/2023.     Diagnosis:   Encounter Diagnoses   Name Primary?    Primary osteoarthritis of left knee     Other tear of lateral meniscus of left knee as current injury, initial encounter     Acute medial meniscal tear, left, subsequent encounter      Physician: Mathew Valdes, NP  Treatment Orders: PT Eval and Treat    Past Medical History:   Diagnosis Date    GERD (gastroesophageal reflux disease)     Seizures      Current Outpatient Medications   Medication Sig    albuterol (PROVENTIL/VENTOLIN HFA) 90 mcg/actuation inhaler Inhale 1-2 puffs into the lungs every 6 (six) hours as needed. Rescue    atorvastatin (LIPITOR) 80 MG tablet Take 80 mg by mouth once daily.    azithromycin (Z-CHAS) 250 MG tablet Take 1 tablet (250 mg total) by mouth once daily. Take first 2 tablets together, then 1 every day until finished.    carbamazepine (TEGRETOL) 200 mg tablet Take 200 mg by mouth 4 (four) times daily.    dextromethorphan-guaiFENesin  mg/5 ml (ROBITUSSIN-DM)  mg/5 mL liquid Take 5 mLs by mouth every 4 (four) hours as needed (cough).    famotidine (PEPCID) 20 MG tablet Take 1 tablet (20 mg total) by mouth 2 (two) times daily. for 14 days    fluticasone propionate (FLONASE) 50 mcg/actuation nasal spray 1 spray (50 mcg total) by Each Nostril route 2 (two) times daily as needed.    furosemide (LASIX) 40 MG tablet Take 40 mg by mouth once daily.    HYDROcodone-acetaminophen (NORCO) 5-325 mg per tablet Take 1 tablet by mouth every 8 (eight) hours as needed for Pain (Severe pain).    HYDROcodone-acetaminophen (NORCO) 5-325 mg per tablet Take 1 tablet by mouth every 6 (six) hours as needed for Pain.    ibuprofen (ADVIL,MOTRIN) 600 MG tablet Take 1 tablet (600 mg total) by mouth every 6 (six) hours as needed for Pain.    loratadine  (CLARITIN) 10 mg tablet Take 1 tablet (10 mg total) by mouth once daily.    meclizine (ANTIVERT) 25 mg tablet Take 1 tablet (25 mg total) by mouth 3 (three) times daily as needed for Dizziness.    naproxen (NAPROSYN) 500 MG tablet Take 1 tablet (500 mg total) by mouth 2 (two) times daily.    naproxen (NAPROSYN) 500 MG tablet Take 1 tablet (500 mg total) by mouth every 12 (twelve) hours as needed (Pain).    naproxen (NAPROSYN) 500 MG tablet Take 1 tablet (500 mg total) by mouth every 12 (twelve) hours as needed (Pain).    ondansetron (ZOFRAN) 4 MG tablet Take 1 tablet (4 mg total) by mouth every 6 (six) hours as needed for Nausea.    ondansetron (ZOFRAN-ODT) 4 MG TbDL Take 1 tablet (4 mg total) by mouth every 8 (eight) hours as needed (Nausea and vomiting).    pantoprazole (PROTONIX) 20 MG tablet Take 1 tablet (20 mg total) by mouth 2 (two) times daily before meals. for 14 days    phenytoin (DILANTIN) 200 MG ER capsule Take 5,000 mg by mouth once daily.     traMADoL (ULTRAM) 50 mg tablet Take 1 tablet (50 mg total) by mouth every 6 (six) hours as needed for Pain.     No current facility-administered medications for this visit.     Review of patient's allergies indicates:   Allergen Reactions    Penicillins Shortness Of Breath     Precautions: Fall        Subjective     Patient States: (L) knee pain gradually starting and increasing in intensity, duration and frequency but unsure of time frame. Patient wearing (L) knee brace. Patient reports he has had several shots in (L) knee as well as fluid aspirated several times. He has also had similar occurrences with (R) knee but not currently; that has been resolved. Patient describes discomfort as burning sensation around and under patella. Patient reports he is trying therapy to try to avoid surgical intervention. Occasional giving out of (L) knee but no falls. He uses crutch/cane when feeling he needed more stability. Stopped using crutches 2 weeks ago. Patient is  disabled and loves to hunt and fish and wants to be able to return to these recreational activities. Patient independent with ADLs with modifications. Patient with one step up to enter home. Standing tolerance 10-15 minutes. Sitting tolerance 60-75 minutes. Patient reports if sits too long knee stiffens up. Patient reports history of bulging disc lumbar spine and hip. Patient reports he wears knee brace mostly during the day and occasionally at night for comfort. (L) knee pain at rest 0/10 and 5/10 with walking activity during evaluation. (L) knee pain 10/10 at worst.     Objective     Observation: Patient ambulated into clinic with knee brace (L) knee.     Posture: Pronation (B) feet.     Range of Motion: Knee all measurements in degrees   Left Right   Flexion: 111 (115) 125   Extension 4 from full extension 0        Lower Extremity Strength  Right LE  Left LE    Knee extension: 4+/5 Knee extension: 4-/5   Knee flexion: 4+/5 Knee flexion: 4-/5   Hip flexion: 4+/5 Hip flexion: 4-/5   Hip extension:  4+/5 Hip extension: 4-/5   Hip abduction: 4+/5 Hip abduction: 4-/5   Hip adduction: 4+/5 Hip adduction 4-/5   Ankle dorsiflexion: 4+/5 Ankle dorsiflexion: 4-/5   Ankle plantarflexion: 4+/5 Ankle plantarflexion: 4-/5          Special Tests: Knee   Left   Ant. Drawer negative   Post. Drawer negative   Lachman's negative   Valgus at 0 Deg negative   Valgus at 30 Deg negative   Varus at 0 Deg negative   Varus at 30 Deg negative   Melody's positive     Joint Mobility: hypomobile (L) knee; WNL (R) knee  Palpation: tenderness to palpation medial and lateral knee,  around and under patella  Sensation: intact to light touch    Edema:  Left: minimal  Right: absent    Girth   Left Right   10 cm above joint line 38 cm 38 cm   Joint Line 39 cm 38 cm   10 cm below joint line 32 cm 32 cm       Gait: Sumeet ambulated with  no assistive device .  Level of Assistance: independent  Patient displays unsteady gait, decreased step  length, increased base of support, decreased weight shift, antalgic gait, and pronation (B) feet, decreased foot clearance, decreased heel strike, lateral excursion during gait .       TREATMENT     Time In: 1500  Time Out: 1542    PT Evaluation Completed? Yes  Discussed Plan of Care with patient: Yes  Written Home Exercises Provided: will provide next visit as exercise tolerance established.     ASSESSMENT   Pt prognosis is Good.  Pt will benefit from skilled outpatient physical therapy to address the above stated deficits, provide pt/family education and to maximize pt's level of independence.     Medical necessity is demonstrated by the following IMPAIRMENTS/PROBLEMS:  1. Decreased (L) knee ROM  2. Increased (L) knee pain  3. Decreased strength (L) LE  4. Gait deviations  5. Decreased tolerance to functional activities.       Pt's spiritual, cultural and educational needs considered and pt agreeable to plan of care and goals as stated below:     Anticipated Barriers for physical therapy:     Short Term GOALS: 3 weeks. Pt agrees with goals set.  1. Patient demonstrates independence with HEP.   2. Patient reports no falls.  3. Patient demonstrates postural awareness with all activities.     Long Term GOALS: 6  weeks. Pt agrees with goals set.  1. Patient reports decreased complaints of (L) knee pain to 2/10 at worst.   2. Patient demonstrates improved (L) Knee ROM to WNL.  3. Patient demonstrates improved (L) LE strength to 4+/5.  4. Patient demonstrates improvement in 5x sit to stand to 12s or less.  5. Patient demonstrates improvement in TUG to 10s or less.   6. Patient reports ability to return to recreational activities.      PLAN     Outpatient physical therapy 3 times weekly to include: pt ed, hep, therapeutic exercises, neuromuscular re-education/ balance exercises, manual therapy and modalities prn including but not limites MHP, CP, electrical stimulation, ultrasound. Cont PT for  6 weeks. Pt may be seen by  PTA as part of the rehabilitation team.   Certification dates: 1/11/2023-2/24/2023    Therapist: Jo Snow, PT    I certify the need for these services furnished under this plan of treatment and while under my care.  ____________________________________ Physician/Referring Practitioner   Date of Signature

## 2023-01-13 ENCOUNTER — CLINICAL SUPPORT (OUTPATIENT)
Dept: REHABILITATION | Facility: HOSPITAL | Age: 58
End: 2023-01-13
Attending: NURSE PRACTITIONER
Payer: MEDICAID

## 2023-01-13 DIAGNOSIS — S83.282D OTHER TEAR OF LATERAL MENISCUS OF LEFT KNEE AS CURRENT INJURY, SUBSEQUENT ENCOUNTER: ICD-10-CM

## 2023-01-13 DIAGNOSIS — M17.12 PRIMARY OSTEOARTHRITIS OF LEFT KNEE: Primary | ICD-10-CM

## 2023-01-13 PROCEDURE — 97110 THERAPEUTIC EXERCISES: CPT

## 2023-01-13 NOTE — PROGRESS NOTES
Physical Therapy Daily Note     Name: Arnulfo Fay  Clinic Number: [id number]  Diagnosis:   Encounter Diagnoses   Name Primary?    Primary osteoarthritis of left knee Yes    Other tear of lateral meniscus of left knee as current injury, subsequent encounter      Physician: Mathew Valdes NP  Precautions: fall  Visit #: 1 of 18  PTA Visit #: 0  Time In: 1400  Time Out: 1455    Subjective     Pt reports: (L) medial knee discomfort but no significant changes since evaluation.   Pain Scale: Arnulfo rates pain on a scale of 0-10 to be 5 currently.    Objective     Arnulfo received individual therapeutic exercises to develop strength, endurance, ROM, flexibility, and posture for 40 minutes including:  All BOLD exercises performed today:   2x10 AP  2x10 QS  2x10 SAQ  2x10 SLR  2x10 supine ball rolls (L) LE  2x10 Supine heel slides with belt for assist   Omnicycle Ortho setting resistance 4, 15 minutes, 98% activity, (L) 56% (R) 44%    Written Home Exercises Provided: will be provided next appt.   Education provided re: modalities, exercise techniques  Arnulfo verbalized good understanding of education provided.   No spiritual or educational barriers to learning provided    Assessment     Patient tolerated treatment well. Patient motivated to participate with therapy intervention. Patient reports no pain following therapy treatment. Will continue to progress towards achievement of patient goals as appropriate.   This is a 57 y.o. male referred to outpatient physical therapy and presents with a medical diagnosis of   Encounter Diagnoses   Name Primary?    Primary osteoarthritis of left knee Yes    Other tear of lateral meniscus of left knee as current injury, subsequent encounter     and demonstrates limitations as described in the problem list. Pt prognosis is Good. Pt will continue to benefit from skilled outpatient physical therapy to address the deficits listed in the  problem list, provide pt/family education and to maximize pt's level of independence in the home and community environment.     Goals as follows:  Short Term GOALS: 3 weeks. Pt agrees with goals set.  1. Patient demonstrates independence with HEP.   2. Patient reports no falls.  3. Patient demonstrates postural awareness with all activities.      Long Term GOALS: 6  weeks. Pt agrees with goals set.  1. Patient reports decreased complaints of (L) knee pain to 2/10 at worst.   2. Patient demonstrates improved (L) Knee ROM to WNL.  3. Patient demonstrates improved (L) LE strength to 4+/5.  4. Patient demonstrates improvement in 5x sit to stand to 12s or less.  5. Patient demonstrates improvement in TUG to 10s or less.   6. Patient reports ability to return to recreational activities.     Plan     Continue with established Plan of Care towards PT goals.    Therapist: Jo Snow, PT  1/13/2023

## 2023-01-17 ENCOUNTER — CLINICAL SUPPORT (OUTPATIENT)
Dept: REHABILITATION | Facility: HOSPITAL | Age: 58
End: 2023-01-17
Payer: MEDICAID

## 2023-01-17 DIAGNOSIS — S83.242D ACUTE MEDIAL MENISCAL TEAR, LEFT, SUBSEQUENT ENCOUNTER: ICD-10-CM

## 2023-01-17 DIAGNOSIS — M17.12 PRIMARY OSTEOARTHRITIS OF LEFT KNEE: Primary | ICD-10-CM

## 2023-01-17 PROCEDURE — 97110 THERAPEUTIC EXERCISES: CPT

## 2023-01-17 PROCEDURE — 97014 ELECTRIC STIMULATION THERAPY: CPT

## 2023-01-17 NOTE — PROGRESS NOTES
Physical Therapy Daily Note     Name: Arnulfo Fay  [facility] Number: [id number]  Diagnosis:   Encounter Diagnoses   Name Primary?    Primary osteoarthritis of left knee Yes    Acute medial meniscal tear, left, subsequent encounter      Physician: Mathew Valdes NP  Precautions: fall  Visit #: 2 of 18  PTA Visit #: 0  Time In: 1400  Time Out: 1454    Subjective     Pt reports: (L) medial knee discomfort intermittently. Patient reports he went [employer] this weekend and is somewhat sore.   Pain Scale: Arnulfo rates pain on a scale of 0-10 to be 5 currently.    Objective     Arnulfo received individual therapeutic exercises to develop strength, endurance, ROM, flexibility, and posture for 39 minutes including:  All BOLD exercises performed today:   2x10 AP  2x10 QS  2x10 SAQ  2x10 SLR  2x10 supine ball rolls (L) LE  2x10 Supine heel slides with belt for assist   LE bicycle x10 minutes with steady revolutions.   IFC to (L) knee x15 minutes with knee flexed over bolster for patient comfort.   Written Home Exercises Provided: see  for details  Education provided re: modalities, exercise techniques  Arnulfo verbalized good understanding of education provided.   No spiritual or educational barriers to learning provided    Assessment     Patient tolerated treatment well. Patient tolerating modalities and exercises well. Patient with improvement in gait. Patient continues to wear knee brace. Will progress per POC as tolerated.   Encounter Diagnoses   Name Primary?    Primary osteoarthritis of left knee Yes    Other tear of lateral meniscus of left knee as current injury, subsequent encounter     and demonstrates limitations as described in the problem list. Pt prognosis is Good. Pt will continue to benefit from skilled outpatient physical therapy to address the deficits listed in the problem list, provide pt/family education and to maximize pt's level of  independence in the home and community environment.     Goals as follows:  Short Term GOALS: 3 weeks. Pt agrees with goals set.  1. Patient demonstrates independence with HEP.   2. Patient reports no falls.  3. Patient demonstrates postural awareness with all activities.      Long Term GOALS: 6  weeks. Pt agrees with goals set.  1. Patient reports decreased complaints of (L) knee pain to 2/10 at worst.   2. Patient demonstrates improved (L) Knee ROM to WNL.  3. Patient demonstrates improved (L) LE strength to 4+/5.  4. Patient demonstrates improvement in 5x sit to stand to 12s or less.  5. Patient demonstrates improvement in TUG to 10s or less.   6. Patient reports ability to return to recreational activities.     Plan     Continue with established Plan of Care towards PT goals.    Therapist: Jo Snow, PT  1/17/2023

## 2023-01-20 ENCOUNTER — CLINICAL SUPPORT (OUTPATIENT)
Dept: REHABILITATION | Facility: HOSPITAL | Age: 58
End: 2023-01-20
Payer: MEDICAID

## 2023-01-20 DIAGNOSIS — S83.282D OTHER TEAR OF LATERAL MENISCUS OF LEFT KNEE AS CURRENT INJURY, SUBSEQUENT ENCOUNTER: ICD-10-CM

## 2023-01-20 DIAGNOSIS — M17.12 PRIMARY OSTEOARTHRITIS OF LEFT KNEE: Primary | ICD-10-CM

## 2023-01-20 PROCEDURE — 97110 THERAPEUTIC EXERCISES: CPT

## 2023-01-20 NOTE — PROGRESS NOTES
Physical Therapy Daily Note     Name: Arnulfo Fay  [facility] Number: [id number]  Diagnosis:   Encounter Diagnoses   Name Primary?    Primary osteoarthritis of left knee Yes    Other tear of lateral meniscus of left knee as current injury, subsequent encounter      Physician: Mathew Valdes NP  Precautions: fall  Visit #: 3 of 18  PTA Visit #: 0  Time In: 1400  Time Out: 1500    Subjective     Pt reports: (L) medial knee discomfort intermittently.   Pain Scale: Arnulfo rates pain on a scale of 0-10 to be 7 currently.    Objective     Arnulfo received individual therapeutic exercises to develop strength, endurance, ROM, flexibility, and posture for 45 minutes including:  All BOLD exercises performed today:   3x10 AP  3x10 QS  3x10 SAQ  3x10 SLR  3x10 supine ball rolls (L) LE  3x10 Supine heel slides with belt for assist   LE bicycle x10 minutes with steady revolutions.   IFC to (L) knee x15 minutes with knee flexed over bolster for patient comfort.   Written Home Exercises Provided: see  for details  Education provided re: modalities, exercise techniques  Arnulfo verbalized good understanding of education provided.   No spiritual or educational barriers to learning provided    Assessment     Patient tolerated treatment well. Patient tolerating modalities and exercises well. Patient continues to wear (L) knee brace. Patient reports increasing activity level and has been going fishing. Decreased intensity, duration and frequency of pain symptoms.    Encounter Diagnoses   Name Primary?    Primary osteoarthritis of left knee Yes    Other tear of lateral meniscus of left knee as current injury, subsequent encounter     and demonstrates limitations as described in the problem list. Pt prognosis is Good. Pt will continue to benefit from skilled outpatient physical therapy to address the deficits listed in the problem list, provide pt/family education and to maximize  pt's level of independence in the home and community environment.     Goals as follows:  Short Term GOALS: 3 weeks. Pt agrees with goals set.  1. Patient demonstrates independence with HEP.   2. Patient reports no falls.  3. Patient demonstrates postural awareness with all activities.      Long Term GOALS: 6  weeks. Pt agrees with goals set.  1. Patient reports decreased complaints of (L) knee pain to 2/10 at worst.   2. Patient demonstrates improved (L) Knee ROM to WNL.  3. Patient demonstrates improved (L) LE strength to 4+/5.  4. Patient demonstrates improvement in 5x sit to stand to 12s or less.  5. Patient demonstrates improvement in TUG to 10s or less.   6. Patient reports ability to return to recreational activities.     Plan     Continue with established Plan of Care towards PT goals.    Therapist: Jo Snow, PT  1/20/2023

## 2023-01-21 ENCOUNTER — HOSPITAL ENCOUNTER (EMERGENCY)
Facility: HOSPITAL | Age: 58
Discharge: HOME | End: 2023-01-21
Attending: STUDENT IN AN ORGANIZED HEALTH CARE EDUCATION/TRAINING PROGRAM
Payer: MEDICAID

## 2023-01-21 VITALS
TEMPERATURE: 98 F | OXYGEN SATURATION: 97 % | BODY MASS INDEX: 26.46 KG/M2 | HEART RATE: 86 BPM | DIASTOLIC BLOOD PRESSURE: 93 MMHG | HEIGHT: 71 IN | SYSTOLIC BLOOD PRESSURE: 125 MMHG | RESPIRATION RATE: 18 BRPM | WEIGHT: 189 LBS

## 2023-01-21 DIAGNOSIS — B34.9 VIRAL SYNDROME: Primary | ICD-10-CM

## 2023-01-21 PROCEDURE — 87502 INFLUENZA DNA AMP PROBE: CPT

## 2023-01-21 PROCEDURE — 87635 SARS-COV-2 COVID-19 AMP PRB: CPT

## 2023-01-21 PROCEDURE — 99283 EMERGENCY DEPT VISIT LOW MDM: CPT | Mod: 25

## 2023-01-21 RX ORDER — NAPROXEN 500 MG/1
500 TABLET ORAL EVERY 12 HOURS PRN
Qty: 20 TABLET | Refills: 0 | Status: SHIPPED | OUTPATIENT
Start: 2023-01-21 | End: 2023-01-31

## 2023-01-21 RX ORDER — FLUTICASONE PROPIONATE 50 MCG
1 SPRAY, SUSPENSION (ML) NASAL 2 TIMES DAILY PRN
Qty: 15 G | Refills: 0 | Status: SHIPPED | OUTPATIENT
Start: 2023-01-21
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
## 2023-01-22 NOTE — DISCHARGE INSTRUCTIONS
You tested negative for COVID and flu.  You have a viral upper respiratory illness take Flonase for congestion.  You take Tylenol as needed for pain.  Sent you a prescription for naproxen which she could take for pain, fever, cough.  Follow up with your primary care provider symptoms do not improve.  Please stop smoking.  Drink plenty of fluids.

## 2023-01-23 ENCOUNTER — CLINICAL SUPPORT (OUTPATIENT)
Dept: REHABILITATION | Facility: HOSPITAL | Age: 58
End: 2023-01-23
Payer: MEDICAID

## 2023-01-23 DIAGNOSIS — M17.12 PRIMARY OSTEOARTHRITIS OF LEFT KNEE: Primary | ICD-10-CM

## 2023-01-23 DIAGNOSIS — S83.242D ACUTE MEDIAL MENISCAL TEAR, LEFT, SUBSEQUENT ENCOUNTER: ICD-10-CM

## 2023-01-23 PROCEDURE — 97110 THERAPEUTIC EXERCISES: CPT

## 2023-01-23 NOTE — PROGRESS NOTES
Physical Therapy Daily Note     Name: Arnulfo Fay  Clinic Number: [id number]  Diagnosis:   Encounter Diagnoses   Name Primary?    Primary osteoarthritis of left knee Yes    Acute medial meniscal tear, left, subsequent encounter      Physician: Mathew Valdes NP  Precautions: fall  Visit #: 4 of 18  PTA Visit #: 0  Time In: 1400  Time Out: 1500    Subjective     Pt reports: minimal to no pain with knee brace on and 3-4/10 without brace. Describes discomfort as burning sensation.   Pain Scale: Arnulfo rates pain on a scale of 0-10 to be 3-4 currently.    Objective     Arnulfo received individual therapeutic exercises to develop strength, endurance, ROM, flexibility, and posture for 45 minutes including:  All BOLD exercises performed today:   2x10 AP YTB  3x10 QS  3x10 SAQ 1/2# ankle weight  2x10 SLR  3x10 supine ball rolls (L) LE  3x10 Supine heel slides with belt for assist   2x10 calf raises/toe raises at edge of TM  LE bicycle x10 minutes with steady revolutions level 2.   IFC and CP to (L) knee x10 minutes with knee flexed over bolster for patient comfort.   Written Home Exercises Provided: see  for details  Education provided re: modalities, exercise techniques  Arnulfo verbalized good understanding of education provided.   No spiritual or educational barriers to learning provided    Assessment     Patient tolerated treatment well. Patient tolerating modalities and exercises well. Patient continues to wear (L) knee brace. Patient demonstrating overall improvement in pain symptoms since beginning therapy intervention.   Patient with decreased antalgic gait with improvement in heel strike during mobility in clinic.     Encounter Diagnoses   Name Primary?    Primary osteoarthritis of left knee Yes    Other tear of lateral meniscus of left knee as current injury, subsequent encounter     and demonstrates limitations as described in the problem list. Pt  prognosis is Good. Pt will continue to benefit from skilled outpatient physical therapy to address the deficits listed in the problem list, provide pt/family education and to maximize pt's level of independence in the home and community environment.     Goals as follows:  Short Term GOALS: 3 weeks. Pt agrees with goals set.  1. Patient demonstrates independence with HEP.   2. Patient reports no falls.  3. Patient demonstrates postural awareness with all activities.      Long Term GOALS: 6  weeks. Pt agrees with goals set.  1. Patient reports decreased complaints of (L) knee pain to 2/10 at worst.   2. Patient demonstrates improved (L) Knee ROM to WNL.  3. Patient demonstrates improved (L) LE strength to 4+/5.  4. Patient demonstrates improvement in 5x sit to stand to 12s or less.  5. Patient demonstrates improvement in TUG to 10s or less.   6. Patient reports ability to return to recreational activities.     Plan     Continue with established Plan of Care towards PT goals.    Therapist: Jo Snow, PT  1/23/2023

## 2023-01-25 ENCOUNTER — CLINICAL SUPPORT (OUTPATIENT)
Dept: REHABILITATION | Facility: HOSPITAL | Age: 58
End: 2023-01-25
Payer: MEDICAID

## 2023-01-25 DIAGNOSIS — S83.242D ACUTE MEDIAL MENISCAL TEAR, LEFT, SUBSEQUENT ENCOUNTER: ICD-10-CM

## 2023-01-25 DIAGNOSIS — M17.12 PRIMARY OSTEOARTHRITIS OF LEFT KNEE: Primary | ICD-10-CM

## 2023-01-25 PROCEDURE — 97110 THERAPEUTIC EXERCISES: CPT

## 2023-01-25 NOTE — PROGRESS NOTES
Physical Therapy Daily Note     Name: Arnulfo Fay  Clinic Number: [id number]  Diagnosis:   Encounter Diagnoses   Name Primary?    Primary osteoarthritis of left knee Yes    Acute medial meniscal tear, left, subsequent encounter      Physician: Mathew Valdes NP  Precautions: fall  Visit #: 5 of 18  PTA Visit #: 0  Time In: 1400  Time Out: 1500    Subjective     Pt reports: (L) knee pain reported. Patient wearing (L) knee brace into and out of clinic. Exercises performed without brace.   Pain Scale: Arnulfo rates pain on a scale of 0-10 to be 5-6 currently.    Objective     Arnulfo received individual therapeutic exercises to develop strength, endurance, ROM, flexibility, and posture for 45 minutes including:  All BOLD exercises performed today:   3x10 AP YTB  3x10 QS  3x10 SAQ 2# ankle weight  2x10 SLR  3x10 supine ball rolls (L) LE  3x10 Supine heel slides with belt for assist   3x10 calf raises/toe raises at edge of TM  LE bicycle x10 minutes with steady revolutions level 1.   IFC and CP to (L) knee x10 minutes with knee flexed over bolster for patient comfort.   Written Home Exercises Provided: see  for details  Education provided re: modalities, exercise techniques  Arnulfo verbalized good understanding of education provided.   No spiritual or educational barriers to learning provided    Assessment     Patient tolerated treatment well. Patient tolerating modalities and exercises well. Patient continues to wear (L) knee brace. PT will re-assess patient next visit. Patient with fluctuation of pain symptoms that appear to be associated with activity level.     Encounter Diagnoses   Name Primary?    Primary osteoarthritis of left knee Yes    Other tear of lateral meniscus of left knee as current injury, subsequent encounter     and demonstrates limitations as described in the problem list. Pt prognosis is Good. Pt will continue to benefit from skilled  outpatient physical therapy to address the deficits listed in the problem list, provide pt/family education and to maximize pt's level of independence in the home and community environment.     Goals as follows:  Short Term GOALS: 3 weeks. Pt agrees with goals set.  1. Patient demonstrates independence with HEP.   2. Patient reports no falls.  3. Patient demonstrates postural awareness with all activities.      Long Term GOALS: 6  weeks. Pt agrees with goals set.  1. Patient reports decreased complaints of (L) knee pain to 2/10 at worst.   2. Patient demonstrates improved (L) Knee ROM to WNL.  3. Patient demonstrates improved (L) LE strength to 4+/5.  4. Patient demonstrates improvement in 5x sit to stand to 12s or less.  5. Patient demonstrates improvement in TUG to 10s or less.   6. Patient reports ability to return to recreational activities.     Plan     Continue with established Plan of Care towards PT goals. Re-assessment next visit.    Therapist: Jo Snow, PT  1/25/2023

## 2023-01-27 ENCOUNTER — CLINICAL SUPPORT (OUTPATIENT)
Dept: REHABILITATION | Facility: HOSPITAL | Age: 58
End: 2023-01-27
Payer: MEDICAID

## 2023-01-27 DIAGNOSIS — M17.12 PRIMARY OSTEOARTHRITIS OF LEFT KNEE: Primary | ICD-10-CM

## 2023-01-27 DIAGNOSIS — S83.282D OTHER TEAR OF LATERAL MENISCUS OF LEFT KNEE AS CURRENT INJURY, SUBSEQUENT ENCOUNTER: ICD-10-CM

## 2023-01-27 PROCEDURE — 97110 THERAPEUTIC EXERCISES: CPT

## 2023-01-27 NOTE — PROGRESS NOTES
Physical Therapy Daily Note     Name: Arnulfo Fay  Clinic Number: [id number]  Diagnosis:   Encounter Diagnoses   Name Primary?    Primary osteoarthritis of left knee Yes    Other tear of lateral meniscus of left knee as current injury, subsequent encounter      Physician: Mathew Valdes NP  Precautions: fall  Visit #: 6 of 18  PTA Visit #: 0  Time In: 1400  Time Out: 1500    Subjective     Pt reports: (L) knee pain reported. Patient wearing (L) knee brace into and out of clinic. Exercises performed without brace.   Pain Scale: Arnulfo rates pain on a scale of 0-10 to be 5 currently.    Objective     Arnulfo received individual therapeutic exercises to develop strength, endurance, ROM, flexibility, and posture for 45 minutes including:  All BOLD exercises performed today:     3x10 QS  3x10 SAQ 2# ankle weight  3x10 SLR  3x10 clams RTB  3x10 supine ball rolls (L) LE  3x10 calf raises/toe raises at edge of TM  3x10 LAQ  LE bicycle x10 minutes with steady revolutions level 1.   IFC and CP to (L) knee x15 minutes with knee in extension for patient comfort.   Written Home Exercises Provided: see  for details  Education provided re: modalities, exercise techniques  Arnulfo verbalized good understanding of education provided.   No spiritual or educational barriers to learning provided    Assessment     Patient tolerated treatment well. Patient tolerating modalities and exercises well. Patient continues to wear (L) knee brace. Re-assessment not performed today. PT will perform next visit. Will continue to progress patient treatment as appropriate.     Encounter Diagnoses   Name Primary?    Primary osteoarthritis of left knee Yes    Other tear of lateral meniscus of left knee as current injury, subsequent encounter     and demonstrates limitations as described in the problem list. Pt prognosis is Good. Pt will continue to benefit from skilled outpatient physical  therapy to address the deficits listed in the problem list, provide pt/family education and to maximize pt's level of independence in the home and community environment.     Goals as follows:  Short Term GOALS: 3 weeks. Pt agrees with goals set.  1. Patient demonstrates independence with HEP.   2. Patient reports no falls.  3. Patient demonstrates postural awareness with all activities.      Long Term GOALS: 6  weeks. Pt agrees with goals set.  1. Patient reports decreased complaints of (L) knee pain to 2/10 at worst.   2. Patient demonstrates improved (L) Knee ROM to WNL.  3. Patient demonstrates improved (L) LE strength to 4+/5.  4. Patient demonstrates improvement in 5x sit to stand to 12s or less.  5. Patient demonstrates improvement in TUG to 10s or less.   6. Patient reports ability to return to recreational activities.     Plan     Continue with established Plan of Care towards PT goals. Re-assessment next visit.    Therapist: Jo Snow, PT  1/27/2023

## 2023-01-30 ENCOUNTER — CLINICAL SUPPORT (OUTPATIENT)
Dept: REHABILITATION | Facility: HOSPITAL | Age: 58
End: 2023-01-30
Payer: MEDICAID

## 2023-01-30 DIAGNOSIS — M17.12 PRIMARY OSTEOARTHRITIS OF LEFT KNEE: Primary | ICD-10-CM

## 2023-01-30 DIAGNOSIS — S83.242D ACUTE MEDIAL MENISCAL TEAR, LEFT, SUBSEQUENT ENCOUNTER: ICD-10-CM

## 2023-01-30 PROCEDURE — 97110 THERAPEUTIC EXERCISES: CPT

## 2023-01-30 NOTE — PROGRESS NOTES
"                                                    Physical Therapy Daily Note     Name: Arnulfo Fay  Clinic Number: [id number]  Diagnosis:   Encounter Diagnoses   Name Primary?    Primary osteoarthritis of left knee Yes    Acute medial meniscal tear, left, subsequent encounter      Physician: Mathew Valdes NP  Precautions: fall  Visit #: 7 of 18  PTA Visit #: 0  Time In: 1400  Time Out: 1505    Subjective     Pt reports: (L) knee pain reported. Patient wearing (L) knee brace into and out of clinic. Patient reports not performing exercises over the weekend and experiencing some "tightness" (L) knee.   Pain Scale: Arnulfo rates pain on a scale of 0-10 to be 6 currently.    Objective     Arnulfo received individual therapeutic exercises to develop strength, endurance, ROM, flexibility, and posture for 50 minutes including:  All BOLD exercises performed today:     3x10 QS-D/C  3x10 SAQ 2# ankle weight  3x10 SLR 2#  3x10 clams GTB  3x10 supine ball rolls (L) LE  3x10 LAQ 2#  + 2x10 Prone hamstring curls  Exercises at Edge of TM:   3x10 calf raises/toe raises   2x10 hip flex  2x10 hip abduction  20 x 5" TKE RTB  LE bicycle x10 minutes with steady revolutions level 1.   IFC and CP to (L) knee x10 minutes with knee in extension for patient comfort.   Written Home Exercises Provided: see  for details  Education provided re: modalities, exercise techniques  Arnulfo verbalized good understanding of education provided.   No spiritual or educational barriers to learning provided    Range of Motion: Knee all measurements in degrees    Left Right   Flexion: 125 125   Extension 0 0      LOWER EXTREMITY FUNCTIONAL SCALE  31/80         1. Any of your usual work, housework or school activities   4/4  2. Your usual hobbies, sporting     3/4  3. Getting in and out of tub      4/4  4. Walking between rooms      4/4  5. Putting on shoes or socks      1/4  6. Squatting        0/4  7. Lifting an object from the ground "      3/4  8. Performing light activities around the home   3/4  9. Performing heavy activities around the home   0/4  10. Getting in and out of car      3/4  11. Walking 2 blocks       0/4  12.Walking a mile       0/4  13. Getting up and down 1 flight of stairs    1/4  14. Standing for 1 hour      0/4  15. Sitting for an hour       2/4  16. Running on even ground      0/4  17. Running on uneven ground     0/4  18. Making sharp turns when running fast    0/4  19. Hopping        0/4  20. Rolling over in bed      3/4     Lower Extremity Strength  Right LE   Left LE     Knee extension: 4+/5 Knee extension: 4/5   Knee flexion: 4+/5 Knee flexion: 4/5   Hip flexion: 4+/5 Hip flexion: 4/5   Hip extension:  4+/5 Hip extension: 4/5   Hip abduction: 4+/5 Hip abduction: 4/5   Hip adduction: 4+/5 Hip adduction 4/5   Ankle dorsiflexion: 4+/5 Ankle dorsiflexion: 4/5   Ankle plantarflexion: 4+/5 Ankle plantarflexion: 4/5          Assessment     Patient tolerated treatment well. Patient tolerating modalities and exercises well. Patient is making improvements with functional activities tolerance and (L) knee ROM since beginning therapy treatment. Patient with improvement in (L) knee strength. Patient continues to experience (L) knee pain with varying intensity. Will assess TUG and 5 time sit to stand next visit.     Encounter Diagnoses   Name Primary?    Primary osteoarthritis of left knee Yes    Other tear of lateral meniscus of left knee as current injury, subsequent encounter     and demonstrates limitations as described in the problem list. Pt prognosis is Good. Pt will continue to benefit from skilled outpatient physical therapy to address the deficits listed in the problem list, provide pt/family education and to maximize pt's level of independence in the home and community environment.     Goals as follows:  Short Term GOALS: 3 weeks. Pt agrees with goals set.  1. Patient demonstrates independence with HEP. Met CB  1/30/2023  2. Patient reports no falls.Met CB 1/30/2023  3. Patient demonstrates postural awareness with all activities. Met CB 1/30/2023     Long Term GOALS: 6  weeks. Pt agrees with goals set.  1. Patient reports decreased complaints of (L) knee pain to 2/10 at worst. Progress CB 1/30/2023  2. Patient demonstrates improved (L) Knee ROM to WNL.Met CB 1/30/2023  3. Patient demonstrates improved (L) LE strength to 4+/5.Progress CB 1/30/2023  4. Patient demonstrates improvement in 5x sit to stand to 12s or less.Not assessed today CB 1/30/2023  5. Patient demonstrates improvement in TUG to 10s or less. Not assessed today CB 1/30/2023  6. Patient reports ability to return to recreational activities.Progress CB 1/30/2023     Plan     Continue with established Plan of Care towards PT goals.   Outpatient physical therapy 3 times weekly to include: pt ed, hep, therapeutic exercises, neuromuscular re-education/ balance exercises, manual therapy and modalities prn including but not limites MHP, CP, electrical stimulation, ultrasound. Cont PT for  6 weeks. Pt may be seen by PTA as part of the rehabilitation team.   Certification dates: 1/11/2023-2/24/2023     Therapist: Jo Snow, PT  1/30/2023

## 2023-02-01 ENCOUNTER — CLINICAL SUPPORT (OUTPATIENT)
Dept: REHABILITATION | Facility: HOSPITAL | Age: 58
End: 2023-02-01
Payer: MEDICAID

## 2023-02-01 DIAGNOSIS — S83.242D ACUTE MEDIAL MENISCAL TEAR, LEFT, SUBSEQUENT ENCOUNTER: ICD-10-CM

## 2023-02-01 DIAGNOSIS — M17.12 PRIMARY OSTEOARTHRITIS OF LEFT KNEE: Primary | ICD-10-CM

## 2023-02-01 PROCEDURE — 97110 THERAPEUTIC EXERCISES: CPT

## 2023-02-01 NOTE — PROGRESS NOTES
"                                                    Physical Therapy Daily Note/progress note     Name: Arnulfo Fay  Clinic Number: [id number]  Diagnosis:   Encounter Diagnoses   Name Primary?    Primary osteoarthritis of left knee Yes    Acute medial meniscal tear, left, subsequent encounter      Physician: Mathew Valdes NP  Precautions: fall  Visit #: 8 of 18  PTA Visit #: 0  Time In: 1400  Time Out: 1510    Subjective     Pt reports: (L) knee pain reported. Patient wearing (L) knee brace into and out of clinic. Patient reports not performing exercises over the weekend and experiencing some "tightness" (L) knee.   Pain Scale: Arnulfo rates pain on a scale of 0-10 to be 7 currently.    Objective     Arnulfo received individual therapeutic exercises to develop strength, endurance, ROM, flexibility, and posture for 55 minutes including:  All BOLD exercises performed today:     3x10 QS-D/C  3x10 SAQ 2# ankle weight  3x10 SLR 2#  3x10 clams GTB  3x10 supine ball rolls (L) LE  3x10 LAQ 2#  + 2x10 Prone hamstring curls    Exercises at Edge of TM:   3x10 calf raises/toe raises   3x10 hip flex  3x10 hip abduction  20 x 5" TKE RTB  LE bicycle x15 minutes with steady revolutions level 2.   IFC and CP to (L) knee x15 minutes with knee in extension for patient comfort.   Written Home Exercises Provided: see  for details  Education provided re: modalities, exercise techniques  Arnulfo verbalized good understanding of education provided.   No spiritual or educational barriers to learning provided      TUG 12.09s  5x sit to stand 17.42s    Range of Motion: Knee all measurements in degrees    Left Right   Flexion: 125 125   Extension 0 0      LOWER EXTREMITY FUNCTIONAL SCALE  31/80         1. Any of your usual work, housework or school activities   4/4  2. Your usual hobbies, sporting     3/4  3. Getting in and out of tub      4/4  4. Walking between rooms      4/4  5. Putting on shoes or socks      1/4  6. " Squatting        0/4  7. Lifting an object from the ground      3/4  8. Performing light activities around the home   3/4  9. Performing heavy activities around the home   0/4  10. Getting in and out of car      3/4  11. Walking 2 blocks       0/4  12.Walking a mile       0/4  13. Getting up and down 1 flight of stairs    1/4  14. Standing for 1 hour      0/4  15. Sitting for an hour       2/4  16. Running on even ground      0/4  17. Running on uneven ground     0/4  18. Making sharp turns when running fast    0/4  19. Hopping        0/4  20. Rolling over in bed      3/4     Lower Extremity Strength  Right LE   Left LE     Knee extension: 4+/5 Knee extension: 4/5   Knee flexion: 4+/5 Knee flexion: 4/5   Hip flexion: 4+/5 Hip flexion: 4/5   Hip extension:  4+/5 Hip extension: 4/5   Hip abduction: 4+/5 Hip abduction: 4/5   Hip adduction: 4+/5 Hip adduction 4/5   Ankle dorsiflexion: 4+/5 Ankle dorsiflexion: 4/5   Ankle plantarflexion: 4+/5 Ankle plantarflexion: 4/5          Assessment     Patient continues to have medial knee discomfort. Patient discomfort appears related to activity level. Patient with improvement in LE strength, ROM. Patient improving with gait tolerance and technique. Will continue to progress per POC as appropriate.     Encounter Diagnoses   Name Primary?    Primary osteoarthritis of left knee Yes    Other tear of lateral meniscus of left knee as current injury, subsequent encounter     and demonstrates limitations as described in the problem list. Pt prognosis is Good. Pt will continue to benefit from skilled outpatient physical therapy to address the deficits listed in the problem list, provide pt/family education and to maximize pt's level of independence in the home and community environment.     Goals as follows:  Short Term GOALS: 3 weeks. Pt agrees with goals set.  1. Patient demonstrates independence with HEP. Met CB 1/30/2023  2. Patient reports no falls.Met CB 1/30/2023  3. Patient  demonstrates postural awareness with all activities. Met CB 1/30/2023     Long Term GOALS: 6  weeks. Pt agrees with goals set.  1. Patient reports decreased complaints of (L) knee pain to 2/10 at worst. Progress CB 1/30/2023  2. Patient demonstrates improved (L) Knee ROM to WNL.Met CB 1/30/2023  3. Patient demonstrates improved (L) LE strength to 4+/5.Progress CB 1/30/2023  4. Patient demonstrates improvement in 5x sit to stand to 12s or less.Not assessed today CB 1/30/2023  5. Patient demonstrates improvement in TUG to 10s or less. Not assessed today CB 1/30/2023  6. Patient reports ability to return to recreational activities.Progress CB 1/30/2023     Plan     Continue with established Plan of Care towards PT goals to complete patient existing POC.    Outpatient physical therapy 3 times weekly to include: pt ed, hep, therapeutic exercises, neuromuscular re-education/ balance exercises, manual therapy and modalities prn including but not limites MHP, CP, electrical stimulation, ultrasound. Cont PT for  6 weeks. Pt may be seen by PTA as part of the rehabilitation team.   Certification dates: 1/11/2023-2/24/2023     Therapist: Jo Snow, PT  2/1/2023

## 2023-02-03 ENCOUNTER — CLINICAL SUPPORT (OUTPATIENT)
Dept: REHABILITATION | Facility: HOSPITAL | Age: 58
End: 2023-02-03
Payer: MEDICAID

## 2023-02-03 DIAGNOSIS — S83.242D ACUTE MEDIAL MENISCAL TEAR, LEFT, SUBSEQUENT ENCOUNTER: ICD-10-CM

## 2023-02-03 DIAGNOSIS — M17.12 PRIMARY OSTEOARTHRITIS OF LEFT KNEE: Primary | ICD-10-CM

## 2023-02-03 PROCEDURE — 97110 THERAPEUTIC EXERCISES: CPT

## 2023-02-03 NOTE — PROGRESS NOTES
"                                                    Physical Therapy Daily Note     Name: Arnulfo Fay  Clinic Number: [id number]  Diagnosis:   Encounter Diagnoses   Name Primary?    Primary osteoarthritis of left knee Yes    Acute medial meniscal tear, left, subsequent encounter      Physician: Mathew Valdes NP  Precautions: fall  Visit #: 9 of 18  PTA Visit #: 0  Time In: 1400  Time Out: 1515    Subjective     Pt reports: (L) knee pain reported. Patient wearing (L) knee brace into and out of clinic. Patient reports noncompliance with HEP this week, but reported he will perform HEP from now on.   Pain Scale: Arnulfo rates pain on a scale of 0-10 to be 7-8 currently.    Objective     Arnulfo received individual therapeutic exercises to develop strength, endurance, ROM, flexibility, and posture for 65 minutes including:  All BOLD exercises performed today:     3x10 SAQ 2# ankle weight  3x10 SLR 2#  3x10 clams GTB  3x10 supine ball rolls (L) LE  3x10 LAQ 2#  3x10 Prone hamstring curls  3x10 Ball squeezes    Exercises at Edge of TM:   3x10 calf raises/toe raises   3x10 hip flex  3x10 hip abduction  30 x 5" TKE RTB    LE bicycle x15 minutes with steady revolutions, level 2.   IFC and CP to (L) knee x10 minutes with knee in extension for patient comfort.   Written Home Exercises Provided: see  for details  Education provided re: modalities, exercise techniques  Arnulfo verbalized good understanding of education provided.   No spiritual or educational barriers to learning provided      Assessment     Patient continues to be educated on performance of HEP to facilitate healing process. Patient performing exercises well in clinic with good form and minimal cues needed. Patient tolerating increase in resistance during exercises. Patient with decreased (L) knee pain after IFC and CP. Will continue to progress patient treatment as appropriate.     Encounter Diagnoses   Name Primary?    Primary osteoarthritis of left " knee Yes    Other tear of lateral meniscus of left knee as current injury, subsequent encounter     and demonstrates limitations as described in the problem list. Pt prognosis is Good. Pt will continue to benefit from skilled outpatient physical therapy to address the deficits listed in the problem list, provide pt/family education and to maximize pt's level of independence in the home and community environment.     Goals as follows:  Short Term GOALS: 3 weeks. Pt agrees with goals set.  1. Patient demonstrates independence with HEP. Met CB 1/30/2023  2. Patient reports no falls.Met CB 1/30/2023  3. Patient demonstrates postural awareness with all activities. Met CB 1/30/2023     Long Term GOALS: 6  weeks. Pt agrees with goals set.  1. Patient reports decreased complaints of (L) knee pain to 2/10 at worst. Progress CB 1/30/2023  2. Patient demonstrates improved (L) Knee ROM to WNL.Met CB 1/30/2023  3. Patient demonstrates improved (L) LE strength to 4+/5.Progress CB 1/30/2023  4. Patient demonstrates improvement in 5x sit to stand to 12s or less.Not assessed today CB 1/30/2023  5. Patient demonstrates improvement in TUG to 10s or less. Not assessed today CB 1/30/2023  6. Patient reports ability to return to recreational activities.Progress CB 1/30/2023     Plan     Continue with established Plan of Care towards PT goals to complete patient existing POC.    Outpatient physical therapy 3 times weekly to include: pt ed, hep, therapeutic exercises, neuromuscular re-education/ balance exercises, manual therapy and modalities prn including but not limites MHP, CP, electrical stimulation, ultrasound. Cont PT for  6 weeks. Pt may be seen by PTA as part of the rehabilitation team.   Certification dates: 1/11/2023-2/24/2023     Therapist: Jo Snow, PT  2/3/2023

## 2023-02-06 ENCOUNTER — CLINICAL SUPPORT (OUTPATIENT)
Dept: REHABILITATION | Facility: HOSPITAL | Age: 58
End: 2023-02-06
Payer: MEDICAID

## 2023-02-06 DIAGNOSIS — S83.242D ACUTE MEDIAL MENISCAL TEAR, LEFT, SUBSEQUENT ENCOUNTER: Primary | ICD-10-CM

## 2023-02-06 DIAGNOSIS — S83.282D OTHER TEAR OF LATERAL MENISCUS OF LEFT KNEE AS CURRENT INJURY, SUBSEQUENT ENCOUNTER: ICD-10-CM

## 2023-02-06 DIAGNOSIS — M17.12 PRIMARY OSTEOARTHRITIS OF LEFT KNEE: ICD-10-CM

## 2023-02-06 PROCEDURE — 97110 THERAPEUTIC EXERCISES: CPT

## 2023-02-06 NOTE — PROGRESS NOTES
"                                                    Physical Therapy Daily Note     Name: Arnulfo Fay  Clinic Number: [id number]  Diagnosis:   Encounter Diagnoses   Name Primary?    Acute medial meniscal tear, left, subsequent encounter Yes    Primary osteoarthritis of left knee     Other tear of lateral meniscus of left knee as current injury, subsequent encounter      Physician: Mathew Valdes NP  Precautions: fall  Visit #: 10 of 18  PTA Visit #: 0  Time In: 1400  Time Out: 1500    Subjective     Pt reports: (L) knee pain reported. Patient wearing (L) knee brace into and out of clinic. Reports compliance with HEP  Pain Scale: Arnulfo rates pain on a scale of 0-10 to be 3  currently.    Objective     Arnulfo received individual therapeutic exercises to develop strength, endurance, ROM, flexibility, and posture for 45 minutes including:  All BOLD exercises performed today:     3x10 SAQ 2# ankle weight  3x10 SLR 2#  3x10 clams GTB  3x10 supine ball rolls (L) LE  3x10 LAQ 2#  3x10 Prone hamstring curls  3x10 Ball squeezes    Exercises at Edge of TM:   3x10 calf raises/toe raises   3x10 hip flex  3x10 hip abduction  20 x 5" TKE GTB    LE bicycle x10 minutes with steady revolutions, level 2.   IFC and CP to (L) knee x10 minutes with knee in extension for patient comfort.   Written Home Exercises Provided: see  for details  Education provided re: modalities, exercise techniques  Arnulfo verbalized good understanding of education provided.   No spiritual or educational barriers to learning provided      Assessment     Patient tolerated treatment well. Will continue to progress per POC as appropriate. Decreased pain reported today.     Encounter Diagnoses   Name Primary?    Primary osteoarthritis of left knee Yes    Other tear of lateral meniscus of left knee as current injury, subsequent encounter     and demonstrates limitations as described in the problem list. Pt prognosis is Good. Pt will continue to " benefit from skilled outpatient physical therapy to address the deficits listed in the problem list, provide pt/family education and to maximize pt's level of independence in the home and community environment.     Goals as follows:  Short Term GOALS: 3 weeks. Pt agrees with goals set.  1. Patient demonstrates independence with HEP. Met CB 1/30/2023  2. Patient reports no falls.Met CB 1/30/2023  3. Patient demonstrates postural awareness with all activities. Met CB 1/30/2023     Long Term GOALS: 6  weeks. Pt agrees with goals set.  1. Patient reports decreased complaints of (L) knee pain to 2/10 at worst. Progress CB 1/30/2023  2. Patient demonstrates improved (L) Knee ROM to WNL.Met CB 1/30/2023  3. Patient demonstrates improved (L) LE strength to 4+/5.Progress CB 1/30/2023  4. Patient demonstrates improvement in 5x sit to stand to 12s or less.Not assessed today CB 1/30/2023  5. Patient demonstrates improvement in TUG to 10s or less. Not assessed today CB 1/30/2023  6. Patient reports ability to return to recreational activities.Progress CB 1/30/2023     Plan     Continue with established Plan of Care towards PT goals to complete patient existing POC.    Outpatient physical therapy 3 times weekly to include: pt ed, hep, therapeutic exercises, neuromuscular re-education/ balance exercises, manual therapy and modalities prn including but not limites MHP, CP, electrical stimulation, ultrasound. Cont PT for  6 weeks. Pt may be seen by PTA as part of the rehabilitation team.   Certification dates: 1/11/2023-2/24/2023     Therapist: Jo Snow, PT  2/6/2023

## 2023-02-08 ENCOUNTER — CLINICAL SUPPORT (OUTPATIENT)
Dept: REHABILITATION | Facility: HOSPITAL | Age: 58
End: 2023-02-08
Payer: MEDICAID

## 2023-02-08 DIAGNOSIS — S83.242D ACUTE MEDIAL MENISCAL TEAR, LEFT, SUBSEQUENT ENCOUNTER: ICD-10-CM

## 2023-02-08 DIAGNOSIS — M17.12 PRIMARY OSTEOARTHRITIS OF LEFT KNEE: Primary | ICD-10-CM

## 2023-02-08 PROCEDURE — 97110 THERAPEUTIC EXERCISES: CPT

## 2023-02-08 NOTE — PROGRESS NOTES
"                                                    Physical Therapy Daily Note     Name: Arnulfo Fay  Clinic Number: [id number]  Diagnosis:   Encounter Diagnoses   Name Primary?    Primary osteoarthritis of left knee Yes    Acute medial meniscal tear, left, subsequent encounter      Physician: Mathew Valdes NP  Precautions: fall  Visit #: 11 of 18  PTA Visit #: 0  Time In: 1350  Time Out: 1457  Re-assessment performed: 2/1/2023 visit 8    Subjective     Pt reports: (L) knee pain reported. Patient wearing (L) knee brace into and out of clinic. Reports compliance with HEP  Pain Scale: Arnulfo rates pain on a scale of 0-10 to be 5 currently.    Objective     Arnulfo received individual therapeutic exercises to develop strength, endurance, ROM, flexibility, and posture for 67 minutes including:  All BOLD exercises performed today:     3x10 SAQ 2# ankle weight  3x10 SLR 2# ankle weight  3x10 clams GTB  3x10 supine ball rolls (L) LE  3x10 LAQ 2#  3x10 Prone hamstring curls 2# ankle weight  3x10 Ball squeezes  +2x10 sit to stand    Exercises at Edge of TM:   3x10 calf raises/toe raises   3x10 hip flex  3x10 hip abduction  20 x 5" TKE GTB (standing at mat)  2x10 lateral steps at treadmill YTB    LE bicycle x15 minutes with steady revolutions, level 2.   IFC and CP to (L) knee x0 minutes with knee in extension for patient comfort.   Written Home Exercises Provided: see  for details  Education provided re: modalities, exercise techniques  Arnulfo verbalized good understanding of education provided.   No spiritual or educational barriers to learning provided      Assessment     Patient tolerated treatment well. Will continue to progress per POC as appropriate. Denied need for modalities following PT treatment. Patient verbally reporting overall decreased in frequency, intensity and duration of pain symptoms and increased pain symptoms appear to be associated with patient increased activity level.     Encounter " Diagnoses   Name Primary?    Primary osteoarthritis of left knee Yes    Other tear of lateral meniscus of left knee as current injury, subsequent encounter     and demonstrates limitations as described in the problem list. Pt prognosis is Good. Pt will continue to benefit from skilled outpatient physical therapy to address the deficits listed in the problem list, provide pt/family education and to maximize pt's level of independence in the home and community environment.     Goals as follows:  Short Term GOALS: 3 weeks. Pt agrees with goals set.  1. Patient demonstrates independence with HEP. Met CB 1/30/2023  2. Patient reports no falls.Met CB 1/30/2023  3. Patient demonstrates postural awareness with all activities. Met CB 1/30/2023     Long Term GOALS: 6  weeks. Pt agrees with goals set.  1. Patient reports decreased complaints of (L) knee pain to 2/10 at worst. Progress CB 1/30/2023  2. Patient demonstrates improved (L) Knee ROM to WNL.Met CB 1/30/2023  3. Patient demonstrates improved (L) LE strength to 4+/5.Progress CB 1/30/2023  4. Patient demonstrates improvement in 5x sit to stand to 12s or less.Not assessed today CB 1/30/2023  5. Patient demonstrates improvement in TUG to 10s or less. Not assessed today CB 1/30/2023  6. Patient reports ability to return to recreational activities.Progress CB 1/30/2023     Plan     Continue with established Plan of Care towards PT goals to complete patient existing POC.    Outpatient physical therapy 3 times weekly to include: pt ed, hep, therapeutic exercises, neuromuscular re-education/ balance exercises, manual therapy and modalities prn including but not limites MHP, CP, electrical stimulation, ultrasound. Cont PT for  6 weeks. Pt may be seen by PTA as part of the rehabilitation team.   Certification dates: 1/11/2023-2/24/2023     Therapist: Jo Snow, PT  2/8/2023

## 2023-02-10 ENCOUNTER — CLINICAL SUPPORT (OUTPATIENT)
Dept: REHABILITATION | Facility: HOSPITAL | Age: 58
End: 2023-02-10
Payer: MEDICAID

## 2023-02-10 DIAGNOSIS — M17.12 PRIMARY OSTEOARTHRITIS OF LEFT KNEE: Primary | ICD-10-CM

## 2023-02-10 DIAGNOSIS — S83.242D ACUTE MEDIAL MENISCAL TEAR, LEFT, SUBSEQUENT ENCOUNTER: ICD-10-CM

## 2023-02-10 PROCEDURE — 97110 THERAPEUTIC EXERCISES: CPT

## 2023-02-10 NOTE — PROGRESS NOTES
"                                                    Physical Therapy Daily Note     Name: Arnulfo Fay  Clinic Number: [id number]  Diagnosis:   Encounter Diagnoses   Name Primary?    Primary osteoarthritis of left knee Yes    Acute medial meniscal tear, left, subsequent encounter      Physician: Mathew Valdes NP  Precautions: fall  Visit #: 12 of 18  PTA Visit #: 0  Time In: 1355  Time Out: 1457  Re-assessment performed: 2/1/2023 visit 8    Subjective     Pt reports: (L) knee pain reported. Patient wearing (L) knee brace into and out of clinic. Reports compliance with HEP  Pain Scale: Arnulfo rates pain on a scale of 0-10 to be 6 currently.    Objective     Arnulfo received individual therapeutic exercises to develop strength, endurance, ROM, flexibility, and posture for 62 minutes including:  All BOLD exercises performed today:     3x10 SAQ 2# ankle weight  3x10 SLR 2# ankle weight  3x10 clams GTB supine  3x10 supine ball rolls (L) LE  3x10 LAQ 2#  3x10 Prone hamstring curls 2# ankle weight  2x10 bridge on ball  +2x10 sit to stand  3x10 ball squeezes    Exercises at Edge of TM:   3x10 calf raises/toe raises   3x10 hip flex  3x10 hip abduction  3x10 hip extension  20 x 5" TKE GTB (standing at mat)  2x10 lateral steps at treadmill RTB  1x10 mini squats    LE bicycle x15 minutes with steady revolutions, level 2.   IFC and CP to (L) knee x0 minutes with knee in extension for patient comfort.   Written Home Exercises Provided: see  for details  Education provided re: modalities, exercise techniques  Arnulfo verbalized good understanding of education provided.   No spiritual or educational barriers to learning provided      Assessment     Patient tolerated treatment well. Will continue to progress per POC as appropriate. Patient reporting increase in functional activity level during daily life. PT thinks patient could continue to benefit from PT intervention and re-assessment will be performed at end of POC. "     Encounter Diagnoses   Name Primary?    Primary osteoarthritis of left knee Yes    Other tear of lateral meniscus of left knee as current injury, subsequent encounter     and demonstrates limitations as described in the problem list. Pt prognosis is Good. Pt will continue to benefit from skilled outpatient physical therapy to address the deficits listed in the problem list, provide pt/family education and to maximize pt's level of independence in the home and community environment.     Goals as follows:  Short Term GOALS: 3 weeks. Pt agrees with goals set.  1. Patient demonstrates independence with HEP. Met CB 1/30/2023  2. Patient reports no falls.Met CB 1/30/2023  3. Patient demonstrates postural awareness with all activities. Met CB 1/30/2023     Long Term GOALS: 6  weeks. Pt agrees with goals set.  1. Patient reports decreased complaints of (L) knee pain to 2/10 at worst. Progress CB 1/30/2023  2. Patient demonstrates improved (L) Knee ROM to WNL.Met CB 1/30/2023  3. Patient demonstrates improved (L) LE strength to 4+/5.Progress CB 1/30/2023  4. Patient demonstrates improvement in 5x sit to stand to 12s or less.Not assessed today CB 1/30/2023  5. Patient demonstrates improvement in TUG to 10s or less. Not assessed today CB 1/30/2023  6. Patient reports ability to return to recreational activities.Progress CB 1/30/2023     Plan     Continue with established Plan of Care towards PT goals to complete patient existing POC.    Outpatient physical therapy 3 times weekly to include: pt ed, hep, therapeutic exercises, neuromuscular re-education/ balance exercises, manual therapy and modalities prn including but not limites MHP, CP, electrical stimulation, ultrasound. Cont PT for  6 weeks. Pt may be seen by PTA as part of the rehabilitation team.   Certification dates: 1/11/2023-2/24/2023     Therapist: Jo Snow, PT  2/10/2023

## 2023-02-13 ENCOUNTER — CLINICAL SUPPORT (OUTPATIENT)
Dept: REHABILITATION | Facility: HOSPITAL | Age: 58
End: 2023-02-13
Payer: MEDICAID

## 2023-02-13 DIAGNOSIS — M17.12 PRIMARY OSTEOARTHRITIS OF LEFT KNEE: Primary | ICD-10-CM

## 2023-02-13 PROCEDURE — 97110 THERAPEUTIC EXERCISES: CPT

## 2023-02-13 NOTE — PROGRESS NOTES
"                                                    Physical Therapy Daily Note     Name: Arnulfo Fay  Clinic Number: [id number]  Diagnosis:   Encounter Diagnosis   Name Primary?    Primary osteoarthritis of left knee Yes     Physician: Mathew Valdes NP  Precautions: fall  Visit #: 13 of 18  PTA Visit #: 0  Time In: 1400  Time Out: 1500  Re-assessment performed: 2/1/2023 visit 8    Subjective     Pt reports: (L) knee pain reported. Patient wearing (L) knee brace into and out of clinic. Reports compliance with HEP  Pain Scale: Arnulfo rates pain on a scale of 0-10 to be 5 currently.    Objective     Arnulfo received individual therapeutic exercises to develop strength, endurance, ROM, flexibility, and posture for 60 minutes including:  All BOLD exercises performed today:     3x10 SAQ 2# ankle weight  3x10 SLR 2# ankle weight  3x10 clams GTB supine  3x10 supine ball rolls (L) LE  3x10 LAQ 2#  3x10 Prone hamstring curls 2# ankle weight  2x10 bridge on ball  +2x10 sit to stand  3x10 ball squeezes    Exercises at Edge of TM:   2x10 calf raises/toe raises   2x10 hip flex  2x10 hip abduction  2x10 hip extension  20 x 5" TKE GTB (standing at mat)  3x10 lateral steps at treadmill RTB  1x10 mini squats    LE bicycle x15 minutes with steady revolutions, level 2.   IFC and CP to (L) knee x0 minutes with knee in extension for patient comfort.   Written Home Exercises Provided: see  for details  Education provided re: modalities, exercise techniques  Arnulfo verbalized good understanding of education provided.   No spiritual or educational barriers to learning provided      Assessment     Patient tolerated treatment well. Will continue to progress per POC as appropriate. PT with overall improvement in functional activity tolerance since beginning therapy treatment.     Encounter Diagnoses   Name Primary?    Primary osteoarthritis of left knee Yes    Other tear of lateral meniscus of left knee as current injury, " subsequent encounter     and demonstrates limitations as described in the problem list. Pt prognosis is Good. Pt will continue to benefit from skilled outpatient physical therapy to address the deficits listed in the problem list, provide pt/family education and to maximize pt's level of independence in the home and community environment.     Goals as follows:  Short Term GOALS: 3 weeks. Pt agrees with goals set.  1. Patient demonstrates independence with HEP. Met CB 1/30/2023  2. Patient reports no falls.Met CB 1/30/2023  3. Patient demonstrates postural awareness with all activities. Met CB 1/30/2023     Long Term GOALS: 6  weeks. Pt agrees with goals set.  1. Patient reports decreased complaints of (L) knee pain to 2/10 at worst. Progress CB 1/30/2023  2. Patient demonstrates improved (L) Knee ROM to WNL.Met CB 1/30/2023  3. Patient demonstrates improved (L) LE strength to 4+/5.Progress CB 1/30/2023  4. Patient demonstrates improvement in 5x sit to stand to 12s or less.Not assessed today CB 1/30/2023  5. Patient demonstrates improvement in TUG to 10s or less. Not assessed today CB 1/30/2023  6. Patient reports ability to return to recreational activities.Progress CB 1/30/2023     Plan     Continue with established Plan of Care towards PT goals to complete patient existing POC.    Outpatient physical therapy 3 times weekly to include: pt ed, hep, therapeutic exercises, neuromuscular re-education/ balance exercises, manual therapy and modalities prn including but not limites MHP, CP, electrical stimulation, ultrasound. Cont PT for  6 weeks. Pt may be seen by PTA as part of the rehabilitation team.   Certification dates: 1/11/2023-2/24/2023     Therapist: Jo Snow, PT  2/13/2023

## 2023-02-15 ENCOUNTER — CLINICAL SUPPORT (OUTPATIENT)
Dept: REHABILITATION | Facility: HOSPITAL | Age: 58
End: 2023-02-15
Payer: MEDICAID

## 2023-02-15 DIAGNOSIS — M17.12 PRIMARY OSTEOARTHRITIS OF LEFT KNEE: Primary | ICD-10-CM

## 2023-02-15 DIAGNOSIS — S83.282D OTHER TEAR OF LATERAL MENISCUS OF LEFT KNEE AS CURRENT INJURY, SUBSEQUENT ENCOUNTER: ICD-10-CM

## 2023-02-15 PROCEDURE — 97110 THERAPEUTIC EXERCISES: CPT

## 2023-02-15 NOTE — PROGRESS NOTES
"                                                    Physical Therapy Daily Note     Name: Arnulfo Fay  Clinic Number: [id number]  Diagnosis:   Encounter Diagnoses   Name Primary?    Primary osteoarthritis of left knee Yes    Other tear of lateral meniscus of left knee as current injury, subsequent encounter      Physician: Mathew Valdes NP  Precautions: fall  Visit #: 14 of 18  PTA Visit #: 0  Time In: 1400  Time Out: 1500  Re-assessment performed: 2/1/2023 visit 8    Subjective     Pt reports: no new complaints today.   Pain Scale: Arnulfo rates pain on a scale of 0-10 to be 4 currently.    Objective     Arnulfo received individual therapeutic exercises to develop strength, endurance, ROM, flexibility, and posture for 60 minutes including:  All BOLD exercises performed today:     3x10 SAQ 2# ankle weight  3x10 SLR 2# ankle weight  3x10 clams GTB supine  3x10 supine ball rolls (L) LE  3x10 LAQ 2# ankle weight  3x10 Prone hamstring curls 2# ankle weight  3x10 bridge on ball  1x10 sit to stand  3x10 ball squeezes    Exercises at Edge of TM:   3x10 calf raises/toe raises   3x10 hip flex  3x10 hip abduction  3x10 hip extension  3x10 x 5" TKE GTB (standing at mat)  3x10 lateral steps at treadmill RTB  1x10 mini squats    LE bicycle x15 minutes with steady revolutions, level 3.   IFC and CP to (L) knee x0 minutes with knee in extension for patient comfort.   Written Home Exercises Provided: see  for details  Education provided re: modalities, exercise techniques  Arnulfo verbalized good understanding of education provided.   No spiritual or educational barriers to learning provided      Assessment     Patient tolerated treatment well. Will continue to progress per POC as appropriate. PT with overall improvement in functional activity tolerance since beginning therapy treatment. Patient with increased knee pain with increase in activities. Patient reports going fishing.     Encounter Diagnoses   Name Primary?    " Primary osteoarthritis of left knee Yes    Other tear of lateral meniscus of left knee as current injury, subsequent encounter     and demonstrates limitations as described in the problem list. Pt prognosis is Good. Pt will continue to benefit from skilled outpatient physical therapy to address the deficits listed in the problem list, provide pt/family education and to maximize pt's level of independence in the home and community environment.     Goals as follows:  Short Term GOALS: 3 weeks. Pt agrees with goals set.  1. Patient demonstrates independence with HEP. Met CB 1/30/2023  2. Patient reports no falls.Met CB 1/30/2023  3. Patient demonstrates postural awareness with all activities. Met CB 1/30/2023     Long Term GOALS: 6  weeks. Pt agrees with goals set.  1. Patient reports decreased complaints of (L) knee pain to 2/10 at worst. Progress CB 1/30/2023  2. Patient demonstrates improved (L) Knee ROM to WNL.Met CB 1/30/2023  3. Patient demonstrates improved (L) LE strength to 4+/5.Progress CB 1/30/2023  4. Patient demonstrates improvement in 5x sit to stand to 12s or less.Not assessed today CB 1/30/2023  5. Patient demonstrates improvement in TUG to 10s or less. Not assessed today CB 1/30/2023  6. Patient reports ability to return to recreational activities.Progress CB 1/30/2023     Plan     Continue with established Plan of Care towards PT goals to complete patient existing POC.    Outpatient physical therapy 3 times weekly to include: pt ed, hep, therapeutic exercises, neuromuscular re-education/ balance exercises, manual therapy and modalities prn including but not limites MHP, CP, electrical stimulation, ultrasound. Cont PT for  6 weeks. Pt may be seen by PTA as part of the rehabilitation team.   Certification dates: 1/11/2023-2/24/2023     Therapist: Jo Snow, PT  2/15/2023

## 2023-02-17 ENCOUNTER — CLINICAL SUPPORT (OUTPATIENT)
Dept: REHABILITATION | Facility: HOSPITAL | Age: 58
End: 2023-02-17
Payer: MEDICAID

## 2023-02-17 DIAGNOSIS — S83.282D OTHER TEAR OF LATERAL MENISCUS OF LEFT KNEE AS CURRENT INJURY, SUBSEQUENT ENCOUNTER: ICD-10-CM

## 2023-02-17 DIAGNOSIS — M17.12 PRIMARY OSTEOARTHRITIS OF LEFT KNEE: Primary | ICD-10-CM

## 2023-02-17 PROCEDURE — 97110 THERAPEUTIC EXERCISES: CPT

## 2023-02-17 NOTE — PROGRESS NOTES
"                                                    Physical Therapy Daily Note     Name: Arnulfo Fay  Clinic Number: [id number]  Diagnosis:   No diagnosis found.    Physician: Mathew Valdes NP  Precautions: fall  Visit #: 15 of 18  PTA Visit #: 0  Time In: 1400  Time Out: 1454  Re-assessment performed: 2/1/2023 visit 8    Subjective     Pt reports: no new complaints today.   Pain Scale: Arnulfo rates pain on a scale of 0-10 to be 5 currently.    Objective     Arnulfo received individual therapeutic exercises to develop strength, endurance, ROM, flexibility, and posture for 54minutes including:  All BOLD exercises performed today:     3x10 SAQ 2# ankle weight  3x10 SLR 2# ankle weight  3x10 clams GTB supine  3x10 supine ball rolls (L) LE  3x10 LAQ 2# ankle weight  3x10 Prone hamstring curls 2# ankle weight  3x10 bridge on ball  1x10 sit to stand  3x10 ball squeezes    Exercises at Edge of TM:   3x10 calf raises/toe raises   3x10 hip flex  3x10 hip abduction  3x10 hip extension  3x10 x 5" TKE GTB (standing at mat)  3x10 lateral steps at treadmill RTB  1x10 mini squats    LE bicycle x15 minutes with steady revolutions, level 3.   IFC and CP to (L) knee x0 minutes with knee in extension for patient comfort.   Written Home Exercises Provided: see  for details  Education provided re: modalities, exercise techniques  Arnulfo verbalized good understanding of education provided.   No spiritual or educational barriers to learning provided      Assessment     Patient tolerated treatment well. Will continue to progress per POC as appropriate. Patient with intermittent complaints of knee pain and appears to be associated with activity level.     Encounter Diagnoses   Name Primary?    Primary osteoarthritis of left knee Yes    Other tear of lateral meniscus of left knee as current injury, subsequent encounter     and demonstrates limitations as described in the problem list. Pt prognosis is Good. Pt will continue to " benefit from skilled outpatient physical therapy to address the deficits listed in the problem list, provide pt/family education and to maximize pt's level of independence in the home and community environment.     Goals as follows:  Short Term GOALS: 3 weeks. Pt agrees with goals set.  1. Patient demonstrates independence with HEP. Met CB 1/30/2023  2. Patient reports no falls.Met CB 1/30/2023  3. Patient demonstrates postural awareness with all activities. Met CB 1/30/2023     Long Term GOALS: 6  weeks. Pt agrees with goals set.  1. Patient reports decreased complaints of (L) knee pain to 2/10 at worst. Progress CB 1/30/2023  2. Patient demonstrates improved (L) Knee ROM to WNL.Met CB 1/30/2023  3. Patient demonstrates improved (L) LE strength to 4+/5.Progress CB 1/30/2023  4. Patient demonstrates improvement in 5x sit to stand to 12s or less.Not assessed today CB 1/30/2023  5. Patient demonstrates improvement in TUG to 10s or less. Not assessed today CB 1/30/2023  6. Patient reports ability to return to recreational activities.Progress CB 1/30/2023     Plan     Continue with established Plan of Care towards PT goals to complete patient existing POC.    Outpatient physical therapy 3 times weekly to include: pt ed, hep, therapeutic exercises, neuromuscular re-education/ balance exercises, manual therapy and modalities prn including but not limites MHP, CP, electrical stimulation, ultrasound. Cont PT for  6 weeks. Pt may be seen by PTA as part of the rehabilitation team.   Certification dates: 1/11/2023-2/24/2023     Therapist: Jo Snow, PT  2/17/2023

## 2023-02-20 ENCOUNTER — CLINICAL SUPPORT (OUTPATIENT)
Dept: REHABILITATION | Facility: HOSPITAL | Age: 58
End: 2023-02-20
Payer: MEDICAID

## 2023-02-20 DIAGNOSIS — S83.242D ACUTE MEDIAL MENISCAL TEAR, LEFT, SUBSEQUENT ENCOUNTER: ICD-10-CM

## 2023-02-20 DIAGNOSIS — M17.12 PRIMARY OSTEOARTHRITIS OF LEFT KNEE: Primary | ICD-10-CM

## 2023-02-20 PROCEDURE — 97110 THERAPEUTIC EXERCISES: CPT

## 2023-02-20 NOTE — PROGRESS NOTES
"                                                    Physical Therapy Daily Note     Name: Arnulfo Fay  [facility] Number: [id number]  Diagnosis:   Encounter Diagnoses   Name Primary?    Primary osteoarthritis of left knee Yes    Acute medial meniscal tear, left, subsequent encounter        Physician: Mathew Valdes NP  Precautions: fall  Visit #: 16 of 18  PTA Visit #: 0  Time In: 1400  Time Out: 1500  Re-assessment performed: 2/1/2023 visit 8    Subjective     Pt reports: no new complaints today. Patient reports "it feels a lot better than it used to."   Pain Scale: Arnulfo rates pain on a scale of 0-10 to be 4 currently.    Objective     Arnulfo received individual therapeutic exercises to develop strength, endurance, ROM, flexibility, and posture for 60 minutes including:  All BOLD exercises performed today:     3x10 SAQ 2# ankle weight  3x10 SLR 2# ankle weight  3x10 clams GTB supine  3x10 LAQ 2# ankle weight  3x10 Prone hamstring curls 2# ankle weight  3x10 bridge on mat  1x10 sit to stand  3x10 ball squeezes    Exercises at Edge of TM:   3x10 calf raises/toe raises   3x10 hip flex  3x10 hip abduction  3x10 hip extension  3x10 x 5" TKE GTB (standing at mat)  3x10 lateral steps at treadmill RTB  1x10 mini squats    LE bicycle x15 minutes with steady revolutions, level 3.   IFC and CP to (L) knee x0 minutes with knee in extension for patient comfort.   Written Home Exercises Provided: see  for details  Education provided re: modalities, exercise techniques  Arnulfo verbalized good understanding of education provided.   No spiritual or educational barriers to learning provided      Assessment     Patient tolerated treatment well. Will continue to progress per POC as appropriate. Patient with intermittent complaints of knee pain and appears to be associated with activity level.     Encounter Diagnoses   Name Primary?    Primary osteoarthritis of left knee Yes    Other tear of lateral meniscus of left knee as " current injury, subsequent encounter     and demonstrates limitations as described in the problem list. Pt prognosis is Good. Pt will continue to benefit from skilled outpatient physical therapy to address the deficits listed in the problem list, provide pt/family education and to maximize pt's level of independence in the home and community environment.     Goals as follows:  Short Term GOALS: 3 weeks. Pt agrees with goals set.  1. Patient demonstrates independence with HEP. Met CB 1/30/2023  2. Patient reports no falls.Met CB 1/30/2023  3. Patient demonstrates postural awareness with all activities. Met CB 1/30/2023     Long Term GOALS: 6  weeks. Pt agrees with goals set.  1. Patient reports decreased complaints of (L) knee pain to 2/10 at worst. Progress CB 1/30/2023  2. Patient demonstrates improved (L) Knee ROM to WNL.Met CB 1/30/2023  3. Patient demonstrates improved (L) LE strength to 4+/5.Progress CB 1/30/2023  4. Patient demonstrates improvement in 5x sit to stand to 12s or less.Not assessed today CB 1/30/2023  5. Patient demonstrates improvement in TUG to 10s or less. Not assessed today CB 1/30/2023  6. Patient reports ability to return to recreational activities.Progress CB 1/30/2023     Plan     Re-assess next visit as next visit is last scheduled visit on current POC.   Outpatient physical therapy 3 times weekly to include: pt ed, hep, therapeutic exercises, neuromuscular re-education/ balance exercises, manual therapy and modalities prn including but not limites MHP, CP, electrical stimulation, ultrasound. Cont PT for  6 weeks. Pt may be seen by PTA as part of the rehabilitation team.   Certification dates: 1/11/2023-2/24/2023     Therapist: Jo Snow, PT  2/20/2023

## 2023-02-22 ENCOUNTER — CLINICAL SUPPORT (OUTPATIENT)
Dept: REHABILITATION | Facility: HOSPITAL | Age: 58
End: 2023-02-22
Payer: MEDICAID

## 2023-02-22 DIAGNOSIS — S83.282D OTHER TEAR OF LATERAL MENISCUS OF LEFT KNEE AS CURRENT INJURY, SUBSEQUENT ENCOUNTER: Primary | ICD-10-CM

## 2023-02-22 DIAGNOSIS — M17.12 PRIMARY OSTEOARTHRITIS OF LEFT KNEE: ICD-10-CM

## 2023-02-22 PROCEDURE — 97110 THERAPEUTIC EXERCISES: CPT

## 2023-02-22 NOTE — PROGRESS NOTES
"                                                    Physical Therapy Daily Note/Discharge Summary     Name: Arnulfo Fay  [facility] Number: [id number]  Diagnosis:   Encounter Diagnoses   Name Primary?    Other tear of lateral meniscus of left knee as current injury, subsequent encounter Yes    Primary osteoarthritis of left knee        Physician: Mathew Valdes NP  Precautions: fall  Visit #: 17 of 18  PTA Visit #: 0  Time In: 1400  Time Out: 1500  Re-assessment performed: 2/22/2023    Subjective     Pt reports: no new complaints today. Patient reports "y'all did real good with my knee."  Pain Scale: Arnulfo rates pain on a scale of 0-10 to be 1-2 currently.    Objective     Arnulfo received individual therapeutic exercises to develop strength, endurance, ROM, flexibility, and posture for 60 minutes including:  All BOLD exercises performed today:     3x10 SAQ 2# ankle weight  3x10 SLR 2# ankle weight  3x10 clams GTB supine  3x10 LAQ 2# ankle weight  3x10 Prone hamstring curls 2# ankle weight  3x10 bridge on mat with ball squeezes  1x10 sit to stand  3x10 ball squeezes    Exercises at Edge of TM:   3x10 calf raises/toe raises   3x10 hip flex  3x10 hip abduction  3x10 hip extension  3x10 x 5" TKE GTB (standing at mat)  3x10 lateral steps at treadmill RTB  1x10 mini squats    LE bicycle x15 minutes with steady revolutions, level 3.     IFC and CP to (L) knee x0 minutes with knee in extension for patient comfort.   Written Home Exercises Provided: see  for details  Education provided re: modalities, exercise techniques  Arnulfo verbalized good understanding of education provided.   No spiritual or educational barriers to learning provided    Range of Motion: Knee all measurements in degrees    Left Right   Flexion: 125 125   Extension 0 0     5x sit to stand: 11.38s  TUG 7.54s    Lower Extremity Strength  Right LE   Left LE     Knee extension: 4+/5 Knee extension: 4+/5   Knee flexion: 4+/5 Knee flexion: 4+/5   Hip " flexion: 4+/5 Hip flexion: 4+/5   Hip extension:  4+/5 Hip extension: 4+/5   Hip abduction: 4+/5 Hip abduction: 4+/5   Hip adduction: 4+/5 Hip adduction 4+/5   Ankle dorsiflexion: 4+/5 Ankle dorsiflexion: 4+/5   Ankle plantarflexion: 4+/5 Ankle plantarflexion: 4+/5     LOWER EXTREMITY FUNCTIONAL SCALE  48/80         1. Any of your usual work, housework or school activities   4/4  2. Your usual hobbies, sporting     4/4  3. Getting in and out of tub      4/4  4. Walking between rooms      4/4  5. Putting on shoes or socks      4/4  6. Squatting        2/4  7. Lifting an object from the ground      3/4  8. Performing light activities around the home   4/4  9. Performing heavy activities around the home   4/4  10. Getting in and out of car      3/4  11. Walking 2 blocks       2/4  12.Walking a mile       0/4  13. Getting up and down 1 flight of stairs    0/4  14. Standing for 1 hour      2/4  15. Sitting for an hour       4/4  16. Running on even ground      0/4  17. Running on uneven ground     0/4  18. Making sharp turns when running fast    0/4  19. Hopping        0/4  20. Rolling over in bed      4/4       Assessment   Patient has made great progress with PT over the course of therapy sessions as evidenced by improvement in objective measures detailed above . PT feels patient could be discharged from PT intervention at this time and continue to perform HEP. Patient to follow up with referring provider as appropriate.      Short Term GOALS: 3 weeks. Pt agrees with goals set.  1. Patient demonstrates independence with HEP. Met CB 1/30/2023  2. Patient reports no falls.Met CB 1/30/2023  3. Patient demonstrates postural awareness with all activities. Met CB 1/30/2023     Long Term GOALS: 6  weeks. Pt agrees with goals set.  1. Patient reports decreased complaints of (L) knee pain to 2/10 at worst. Met CB 2/22/2023  Progress CB 1/30/2023  2. Patient demonstrates improved (L) Knee ROM to WNL.Met CB 1/30/2023  3. Patient  demonstrates improved (L) LE strength to 4+/5. Met CB 2/22/2023 Progress CB 1/30/2023  4. Patient demonstrates improvement in 5x sit to stand to 12s or less.Met CB 2/22/2023  Not assessed today CB 1/30/2023  5. Patient demonstrates improvement in TUG to 10s or less. Met CB 2/22/2023  Not assessed today CB 1/30/2023  6. Patient reports ability to return to recreational activities.Met CB 2/22/2023 Progress CB 1/30/2023     Plan   Discharge from PT at this time due to achieving all goals. Patient to follow up with referring provider as appropriate.     Therapist: Jo Snow, PT  2/22/2023

## 2023-03-03 ENCOUNTER — OFFICE VISIT (OUTPATIENT)
Dept: ORTHOPEDICS | Facility: CLINIC | Age: 58
End: 2023-03-03
Payer: MEDICAID

## 2023-03-03 DIAGNOSIS — M17.12 PRIMARY OSTEOARTHRITIS OF LEFT KNEE: Primary | ICD-10-CM

## 2023-03-03 DIAGNOSIS — M25.462 KNEE EFFUSION, LEFT: ICD-10-CM

## 2023-03-03 PROCEDURE — 99999 PR PBB SHADOW E&M-EST. PATIENT-LVL III: ICD-10-PCS | Mod: PBBFAC,,, | Performed by: NURSE PRACTITIONER

## 2023-03-03 PROCEDURE — 99213 OFFICE O/P EST LOW 20 MIN: CPT | Mod: PBBFAC | Performed by: NURSE PRACTITIONER

## 2023-03-03 PROCEDURE — 1159F MED LIST DOCD IN RCRD: CPT | Mod: CPTII,,, | Performed by: NURSE PRACTITIONER

## 2023-03-03 PROCEDURE — 1160F RVW MEDS BY RX/DR IN RCRD: CPT | Mod: CPTII,,, | Performed by: NURSE PRACTITIONER

## 2023-03-03 PROCEDURE — 99999 PR PBB SHADOW E&M-EST. PATIENT-LVL III: CPT | Mod: PBBFAC,,, | Performed by: NURSE PRACTITIONER

## 2023-03-03 PROCEDURE — 99213 PR OFFICE/OUTPT VISIT, EST, LEVL III, 20-29 MIN: ICD-10-PCS | Mod: S$PBB,,, | Performed by: NURSE PRACTITIONER

## 2023-03-03 PROCEDURE — 99213 OFFICE O/P EST LOW 20 MIN: CPT | Mod: S$PBB,,, | Performed by: NURSE PRACTITIONER

## 2023-03-03 PROCEDURE — 1160F PR REVIEW ALL MEDS BY PRESCRIBER/CLIN PHARMACIST DOCUMENTED: ICD-10-PCS | Mod: CPTII,,, | Performed by: NURSE PRACTITIONER

## 2023-03-03 PROCEDURE — 1159F PR MEDICATION LIST DOCUMENTED IN MEDICAL RECORD: ICD-10-PCS | Mod: CPTII,,, | Performed by: NURSE PRACTITIONER

## 2023-03-03 NOTE — PROGRESS NOTES
Subjective:      Follow up: Left knee      Arnulfo Fay is a 57 y.o. male returns for follow up for left knee pain, swelling and decreased ROM. He presents and states that he is doing well. He has completed therapy with good results. He rates his pain as 0/10 in the left knee. He is wearing his brace as needed. He is noted without a mild limp.          Review of Systems   Constitutional: Negative.  Negative for fever.   Musculoskeletal:  Positive for joint pain.   Skin: Negative.    Neurological: Negative.    Psychiatric/Behavioral: Negative.     All other systems reviewed and are negative.      Objective:      NVI  General :   alert, appears stated age, and cooperative   Gait: Antalgic- mild   Left Lower Extremity  Hip Palpation:  no tenderness over the greater trochanters   Hip ROM: 100% of normal    Knee Effusion:  none   Ecchymosis:  none   Knee ROM:  5 to 115 degrees with subpatellar crepitance.   Patella:  Patella does track normally.  Patellar apprehension test: negative  Patellar compression test: negative   Tenderness: None today   Stability:  Lachman's test: negative  Posterior drawer: negative  Medial collateral ligament: negative  Lateral collateral ligament: negative     Dominique Test:  negative   Melody's Test:  negative   Sensation:   intact to light touch   Pulses: normal DP and PT pulses.    Improved quad weakness noted.   Brisk cap refill.      Imaging  none today     Assessment:      Left knee pain, DJD, chondrocalcinosis      Plan:      He is clinically improved.  Continue home physician directed exercises.   Ice and Nsaid as previous. Limit Stairs and squatting.   Continue crossfire bioskin knee brace prn.   Follow up: prn  He had no further questions.

## 2023-03-17 ENCOUNTER — HOSPITAL ENCOUNTER (EMERGENCY)
Facility: HOSPITAL | Age: 58
Discharge: HOME | End: 2023-03-17
Attending: EMERGENCY MEDICINE
Payer: MEDICAID

## 2023-03-17 VITALS
BODY MASS INDEX: 25.2 KG/M2 | WEIGHT: 180 LBS | HEART RATE: 70 BPM | RESPIRATION RATE: 18 BRPM | TEMPERATURE: 98 F | OXYGEN SATURATION: 98 % | SYSTOLIC BLOOD PRESSURE: 116 MMHG | HEIGHT: 71 IN | DIASTOLIC BLOOD PRESSURE: 76 MMHG

## 2023-03-17 DIAGNOSIS — H00.012 HORDEOLUM EXTERNUM OF RIGHT LOWER EYELID: Primary | ICD-10-CM

## 2023-03-17 PROCEDURE — 99283 EMERGENCY DEPT VISIT LOW MDM: CPT

## 2023-03-17 RX ORDER — ERYTHROMYCIN 5 MG/G
OINTMENT OPHTHALMIC
Qty: 3.5 G | Refills: 0 | Status: SHIPPED | OUTPATIENT
Start: 2023-03-17 | End: 2023-03-17 | Stop reason: SDUPTHER

## 2023-03-17 RX ORDER — ERYTHROMYCIN 5 MG/G
OINTMENT OPHTHALMIC
Qty: 3.5 G | Refills: 0 | Status: SHIPPED | OUTPATIENT
Start: 2023-03-17
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
## 2023-03-17 NOTE — DISCHARGE INSTRUCTIONS
Use the eye ointment 4 times per day for a week. Use warm compresses for 15 minutes at a time also. Follow up with your primary care provider or ophthalmology.

## 2023-05-01 ENCOUNTER — HOSPITAL ENCOUNTER (EMERGENCY)
Facility: HOSPITAL | Age: 58
Discharge: HOME | End: 2023-05-01
Attending: EMERGENCY MEDICINE
Payer: MEDICAID

## 2023-05-01 VITALS
RESPIRATION RATE: 18 BRPM | HEART RATE: 91 BPM | DIASTOLIC BLOOD PRESSURE: 82 MMHG | BODY MASS INDEX: 25.9 KG/M2 | HEIGHT: 71 IN | SYSTOLIC BLOOD PRESSURE: 104 MMHG | TEMPERATURE: 99 F | OXYGEN SATURATION: 98 % | WEIGHT: 185 LBS

## 2023-05-01 DIAGNOSIS — T14.8XXA ANIMAL BITE: Primary | ICD-10-CM

## 2023-05-01 PROCEDURE — 25000003 PHARM REV CODE 250: Performed by: NURSE PRACTITIONER

## 2023-05-01 PROCEDURE — 99284 EMERGENCY DEPT VISIT MOD MDM: CPT

## 2023-05-01 RX ORDER — DOXYCYCLINE 100 MG/1
100 CAPSULE ORAL 2 TIMES DAILY
Qty: 20 CAPSULE | Refills: 0 | Status: SHIPPED | OUTPATIENT
Start: 2023-05-01 | End: 2023-05-11

## 2023-05-01 RX ORDER — MUPIROCIN 20 MG/G
OINTMENT TOPICAL 3 TIMES DAILY
Qty: 22 G | Refills: 0 | Status: SHIPPED | OUTPATIENT
Start: 2023-05-01 | End: 2023-05-11

## 2023-05-01 RX ORDER — MUPIROCIN 20 MG/G
1 OINTMENT TOPICAL
Status: COMPLETED | OUTPATIENT
Start: 2023-05-01 | End: 2023-05-01

## 2023-05-01 RX ADMIN — MUPIROCIN 22 G: 20 OINTMENT TOPICAL at 06:05
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
## 2023-06-21 ENCOUNTER — HOSPITAL ENCOUNTER (EMERGENCY)
Facility: HOSPITAL | Age: 58
Discharge: HOME | End: 2023-06-21
Attending: EMERGENCY MEDICINE
Payer: MEDICAID

## 2023-06-21 VITALS
DIASTOLIC BLOOD PRESSURE: 82 MMHG | BODY MASS INDEX: 26.04 KG/M2 | RESPIRATION RATE: 18 BRPM | HEIGHT: 71 IN | TEMPERATURE: 98 F | HEART RATE: 64 BPM | OXYGEN SATURATION: 97 % | SYSTOLIC BLOOD PRESSURE: 135 MMHG | WEIGHT: 186 LBS

## 2023-06-21 DIAGNOSIS — S70.02XA CONTUSION OF LEFT HIP, INITIAL ENCOUNTER: ICD-10-CM
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
## 2023-06-21 DIAGNOSIS — S60.212A CONTUSION OF LEFT WRIST, INITIAL ENCOUNTER: Primary | ICD-10-CM

## 2023-06-21 PROCEDURE — 99284 EMERGENCY DEPT VISIT MOD MDM: CPT

## 2023-06-21 RX ORDER — CYCLOBENZAPRINE HCL 10 MG
10 TABLET ORAL 3 TIMES DAILY PRN
Qty: 15 TABLET | Refills: 0 | Status: SHIPPED | OUTPATIENT
Start: 2023-06-21 | End: 2023-06-26

## 2023-06-21 RX ORDER — KETOROLAC TROMETHAMINE 10 MG/1
10 TABLET, FILM COATED ORAL EVERY 6 HOURS
Qty: 20 TABLET | Refills: 0 | Status: SHIPPED | OUTPATIENT
Start: 2023-06-21 | End: 2023-06-26
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
## 2023-06-22 ENCOUNTER — HOSPITAL ENCOUNTER (EMERGENCY)
Facility: HOSPITAL | Age: 58
Discharge: HOME | End: 2023-06-22
Attending: EMERGENCY MEDICINE
Payer: MEDICAID

## 2023-06-22 VITALS
WEIGHT: 189 LBS | SYSTOLIC BLOOD PRESSURE: 178 MMHG | TEMPERATURE: 99 F | DIASTOLIC BLOOD PRESSURE: 67 MMHG | RESPIRATION RATE: 18 BRPM | HEIGHT: 71 IN | BODY MASS INDEX: 26.46 KG/M2 | OXYGEN SATURATION: 99 % | HEART RATE: 68 BPM

## 2023-06-22 DIAGNOSIS — L02.91 ABSCESS: Primary | ICD-10-CM

## 2023-06-22 PROCEDURE — 10060 I&D ABSCESS SIMPLE/SINGLE: CPT

## 2023-06-22 PROCEDURE — 25000003 PHARM REV CODE 250

## 2023-06-22 PROCEDURE — 99283 EMERGENCY DEPT VISIT LOW MDM: CPT | Mod: 25

## 2023-06-22 RX ORDER — LIDOCAINE HYDROCHLORIDE 10 MG/ML
5 INJECTION, SOLUTION EPIDURAL; INFILTRATION; INTRACAUDAL; PERINEURAL
Status: COMPLETED | OUTPATIENT
Start: 2023-06-22 | End: 2023-06-22

## 2023-06-22 RX ORDER — SULFAMETHOXAZOLE AND TRIMETHOPRIM 800; 160 MG/1; MG/1
1 TABLET ORAL 2 TIMES DAILY
Qty: 14 TABLET | Refills: 0 | Status: SHIPPED | OUTPATIENT
Start: 2023-06-22 | End: 2023-06-29

## 2023-06-22 RX ADMIN — LIDOCAINE HYDROCHLORIDE 50 MG: 10 INJECTION, SOLUTION EPIDURAL; INFILTRATION; INTRACAUDAL; PERINEURAL at 06:06

## 2023-06-22 NOTE — ED NOTES
C/o painful abscess to center of upper back. Started two weeks ago and has gotten increasingly worse.

## 2023-06-23 NOTE — DISCHARGE INSTRUCTIONS
You had an abscess that I drained.  Take antibiotics twice a day for 7 days.  Follow up in 2-3 days for packing removal and wound recheck.
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
## 2023-07-24 ENCOUNTER — OFFICE VISIT (OUTPATIENT)
Dept: SURGERY | Facility: CLINIC | Age: 58
End: 2023-07-24
Payer: MEDICAID

## 2023-07-24 VITALS
HEART RATE: 67 BPM | BODY MASS INDEX: 26.55 KG/M2 | OXYGEN SATURATION: 97 % | HEIGHT: 71 IN | DIASTOLIC BLOOD PRESSURE: 76 MMHG | SYSTOLIC BLOOD PRESSURE: 110 MMHG | WEIGHT: 189.63 LBS

## 2023-07-24 DIAGNOSIS — D23.5 DERMOID CYST OF SKIN OF BACK: Primary | ICD-10-CM

## 2023-07-24 PROCEDURE — 1159F MED LIST DOCD IN RCRD: CPT | Mod: CPTII,,, | Performed by: STUDENT IN AN ORGANIZED HEALTH CARE EDUCATION/TRAINING PROGRAM

## 2023-07-24 PROCEDURE — 3008F BODY MASS INDEX DOCD: CPT | Mod: CPTII,,, | Performed by: STUDENT IN AN ORGANIZED HEALTH CARE EDUCATION/TRAINING PROGRAM

## 2023-07-24 PROCEDURE — 1159F PR MEDICATION LIST DOCUMENTED IN MEDICAL RECORD: ICD-10-PCS | Mod: CPTII,,, | Performed by: STUDENT IN AN ORGANIZED HEALTH CARE EDUCATION/TRAINING PROGRAM

## 2023-07-24 PROCEDURE — 3008F PR BODY MASS INDEX (BMI) DOCUMENTED: ICD-10-PCS | Mod: CPTII,,, | Performed by: STUDENT IN AN ORGANIZED HEALTH CARE EDUCATION/TRAINING PROGRAM

## 2023-07-24 PROCEDURE — 99215 OFFICE O/P EST HI 40 MIN: CPT | Mod: S$PBB,25,, | Performed by: STUDENT IN AN ORGANIZED HEALTH CARE EDUCATION/TRAINING PROGRAM

## 2023-07-24 PROCEDURE — 99999 PR PBB SHADOW E&M-EST. PATIENT-LVL IV: CPT | Mod: PBBFAC,,, | Performed by: STUDENT IN AN ORGANIZED HEALTH CARE EDUCATION/TRAINING PROGRAM

## 2023-07-24 PROCEDURE — 99215 PR OFFICE/OUTPT VISIT, EST, LEVL V, 40-54 MIN: ICD-10-PCS | Mod: S$PBB,25,, | Performed by: STUDENT IN AN ORGANIZED HEALTH CARE EDUCATION/TRAINING PROGRAM

## 2023-07-24 PROCEDURE — 3078F DIAST BP <80 MM HG: CPT | Mod: CPTII,,, | Performed by: STUDENT IN AN ORGANIZED HEALTH CARE EDUCATION/TRAINING PROGRAM

## 2023-07-24 PROCEDURE — 11403 EXCISION OF LESION: ICD-10-PCS | Mod: S$PBB,,, | Performed by: STUDENT IN AN ORGANIZED HEALTH CARE EDUCATION/TRAINING PROGRAM

## 2023-07-24 PROCEDURE — 3074F SYST BP LT 130 MM HG: CPT | Mod: CPTII,,, | Performed by: STUDENT IN AN ORGANIZED HEALTH CARE EDUCATION/TRAINING PROGRAM

## 2023-07-24 PROCEDURE — 3078F PR MOST RECENT DIASTOLIC BLOOD PRESSURE < 80 MM HG: ICD-10-PCS | Mod: CPTII,,, | Performed by: STUDENT IN AN ORGANIZED HEALTH CARE EDUCATION/TRAINING PROGRAM

## 2023-07-24 PROCEDURE — 3074F PR MOST RECENT SYSTOLIC BLOOD PRESSURE < 130 MM HG: ICD-10-PCS | Mod: CPTII,,, | Performed by: STUDENT IN AN ORGANIZED HEALTH CARE EDUCATION/TRAINING PROGRAM

## 2023-07-24 PROCEDURE — 99214 OFFICE O/P EST MOD 30 MIN: CPT | Mod: PBBFAC,25 | Performed by: STUDENT IN AN ORGANIZED HEALTH CARE EDUCATION/TRAINING PROGRAM

## 2023-07-24 PROCEDURE — 99999 PR PBB SHADOW E&M-EST. PATIENT-LVL IV: ICD-10-PCS | Mod: PBBFAC,,, | Performed by: STUDENT IN AN ORGANIZED HEALTH CARE EDUCATION/TRAINING PROGRAM

## 2023-07-24 PROCEDURE — 11403 EXC TR-EXT B9+MARG 2.1-3CM: CPT | Mod: PBBFAC | Performed by: STUDENT IN AN ORGANIZED HEALTH CARE EDUCATION/TRAINING PROGRAM
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
## 2023-07-25 NOTE — PROCEDURES
Excision of Lesion    Date/Time: 7/24/2023 4:00 PM  Performed by: Ericka Tirado MD  Authorized by: Ericka Tirado MD     Consent Done?:  Yes (Written)  Timeout: prior to procedure the correct patient, procedure, and site was verified    Prep: patient was prepped and draped in usual sterile fashion    Local anesthesia used?: Yes    Anesthesia:  Local infiltration  Local anesthetic:  Lidocaine 1% without epinephrine  Anesthetic total (ml):  10  Assistants?: No    Indications:  Cyst  Body area:  Back / flank  Location modifier: medial.  Position:  Prone   Patient was prepped and draped in the normal sterile fashion.  Anesthesia:  Local infiltration  Local anesthetic:  Lidocaine 1% without epinephrine  Excision type:  Skin  Malignancy:  Benign  Excision size (cm):  2.5  Scalpel size:  15  Incision type:  Elliptical  Specimens?: Yes     Specimens submitted to pathology.   Hemostasis was obtained.  Estimated blood loss (cc):  3  Wound closure:  Simple  Sutures:  Other (comments) (2-0 Nylon)  Sterile dressings:  Gauze  Post-op diagnosis:  Same as pre-op diagnosis.   Needle, instrument, and sponge counts were correct.   Patient tolerated the procedure well with no immediate complications.   Post-operative instructions were provided for the patient.   Patient was discharged and will follow up for wound check and pathology results. and Patient was discharged and will follow up for wound check.    Ericka Tirado MD  General Surgery   [phone]

## 2023-07-26 LAB — TISSUE SPECIMEN TO PATHOLOGY: NORMAL

## 2023-07-27 ENCOUNTER — OFFICE VISIT (OUTPATIENT)
Dept: SURGERY | Facility: CLINIC | Age: 58
End: 2023-07-27
Payer: MEDICAID

## 2023-07-27 VITALS
DIASTOLIC BLOOD PRESSURE: 64 MMHG | OXYGEN SATURATION: 96 % | WEIGHT: 189 LBS | SYSTOLIC BLOOD PRESSURE: 101 MMHG | BODY MASS INDEX: 26.46 KG/M2 | HEIGHT: 71 IN | HEART RATE: 63 BPM

## 2023-07-27 DIAGNOSIS — D23.5 DERMOID CYST OF SKIN OF BACK: Primary | ICD-10-CM

## 2023-07-27 PROCEDURE — 99024 POSTOP FOLLOW-UP VISIT: CPT | Mod: ,,, | Performed by: STUDENT IN AN ORGANIZED HEALTH CARE EDUCATION/TRAINING PROGRAM

## 2023-07-27 PROCEDURE — 99024 PR POST-OP FOLLOW-UP VISIT: ICD-10-PCS | Mod: ,,, | Performed by: STUDENT IN AN ORGANIZED HEALTH CARE EDUCATION/TRAINING PROGRAM

## 2023-07-27 PROCEDURE — 99999 PR PBB SHADOW E&M-EST. PATIENT-LVL IV: ICD-10-PCS | Mod: PBBFAC,,, | Performed by: STUDENT IN AN ORGANIZED HEALTH CARE EDUCATION/TRAINING PROGRAM

## 2023-07-27 PROCEDURE — 3078F PR MOST RECENT DIASTOLIC BLOOD PRESSURE < 80 MM HG: ICD-10-PCS | Mod: CPTII,,, | Performed by: STUDENT IN AN ORGANIZED HEALTH CARE EDUCATION/TRAINING PROGRAM

## 2023-07-27 PROCEDURE — 99999 PR PBB SHADOW E&M-EST. PATIENT-LVL IV: CPT | Mod: PBBFAC,,, | Performed by: STUDENT IN AN ORGANIZED HEALTH CARE EDUCATION/TRAINING PROGRAM

## 2023-07-27 PROCEDURE — 3008F BODY MASS INDEX DOCD: CPT | Mod: CPTII,,, | Performed by: STUDENT IN AN ORGANIZED HEALTH CARE EDUCATION/TRAINING PROGRAM

## 2023-07-27 PROCEDURE — 99214 OFFICE O/P EST MOD 30 MIN: CPT | Mod: PBBFAC | Performed by: STUDENT IN AN ORGANIZED HEALTH CARE EDUCATION/TRAINING PROGRAM

## 2023-07-27 PROCEDURE — 3074F PR MOST RECENT SYSTOLIC BLOOD PRESSURE < 130 MM HG: ICD-10-PCS | Mod: CPTII,,, | Performed by: STUDENT IN AN ORGANIZED HEALTH CARE EDUCATION/TRAINING PROGRAM

## 2023-07-27 PROCEDURE — 3078F DIAST BP <80 MM HG: CPT | Mod: CPTII,,, | Performed by: STUDENT IN AN ORGANIZED HEALTH CARE EDUCATION/TRAINING PROGRAM

## 2023-07-27 PROCEDURE — 3008F PR BODY MASS INDEX (BMI) DOCUMENTED: ICD-10-PCS | Mod: CPTII,,, | Performed by: STUDENT IN AN ORGANIZED HEALTH CARE EDUCATION/TRAINING PROGRAM

## 2023-07-27 PROCEDURE — 3074F SYST BP LT 130 MM HG: CPT | Mod: CPTII,,, | Performed by: STUDENT IN AN ORGANIZED HEALTH CARE EDUCATION/TRAINING PROGRAM

## 2023-07-29 ENCOUNTER — HOSPITAL ENCOUNTER (EMERGENCY)
Facility: HOSPITAL | Age: 58
Discharge: HOME | End: 2023-07-29
Attending: STUDENT IN AN ORGANIZED HEALTH CARE EDUCATION/TRAINING PROGRAM
Payer: MEDICAID

## 2023-07-29 VITALS
WEIGHT: 187 LBS | SYSTOLIC BLOOD PRESSURE: 124 MMHG | DIASTOLIC BLOOD PRESSURE: 81 MMHG | HEIGHT: 71 IN | HEART RATE: 64 BPM | RESPIRATION RATE: 18 BRPM | BODY MASS INDEX: 26.18 KG/M2 | OXYGEN SATURATION: 96 % | TEMPERATURE: 98 F

## 2023-07-29 DIAGNOSIS — J32.9 SINUSITIS, UNSPECIFIED CHRONICITY, UNSPECIFIED LOCATION: Primary | ICD-10-CM

## 2023-07-29 LAB
CTP QC/QA: YES
SARS-COV-2 RDRP RESP QL NAA+PROBE: NEGATIVE

## 2023-07-29 PROCEDURE — 99284 EMERGENCY DEPT VISIT MOD MDM: CPT

## 2023-07-29 PROCEDURE — 87635 SARS-COV-2 COVID-19 AMP PRB: CPT | Performed by: STUDENT IN AN ORGANIZED HEALTH CARE EDUCATION/TRAINING PROGRAM

## 2023-07-29 RX ORDER — AZELASTINE 1 MG/ML
1 SPRAY, METERED NASAL 2 TIMES DAILY
Qty: 30 ML | Refills: 0 | Status: SHIPPED | OUTPATIENT
Start: 2023-07-29 | End: 2023-08-03

## 2023-07-29 RX ORDER — IBUPROFEN 600 MG/1
600 TABLET ORAL EVERY 6 HOURS PRN
Qty: 20 TABLET | Refills: 0 | OUTPATIENT
Start: 2023-07-29 | End: 2023-08-29

## 2023-07-29 RX ORDER — DOXYCYCLINE 100 MG/1
100 CAPSULE ORAL EVERY 12 HOURS
Qty: 14 CAPSULE | Refills: 0 | Status: SHIPPED | OUTPATIENT
Start: 2023-07-29 | End: 2023-08-05
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
## 2023-08-02 NOTE — PROGRESS NOTES
"[facility]  General Surgery Clinic Progress Note    HPI:  Arnulfo Fay is a 57 y.o. male with history of back cyst s/p excision who presents for follow up.  Needs bandage changed and lives alone.  Voices no complaints.     ROS:  Negative except above    PE:  Vitals:    07/27/23 1517   BP: 101/64   BP Location: Left arm   Patient Position: Sitting   BP Method: Medium (Automatic)   Pulse: 63   SpO2: 96%   Weight: 85.7 kg (189 lb)   Height: 5' 11" (1.803 m)        Gen: Alert and oriented x3, judgement intact, NAD  CV: RRR  Resp: CTAB; breaths non-labored and symmetrical  Abd: Soft, NT, ND, BS+  Extremities: No c/c/e  Skin: Mid back incision C/D/I with sutures in place with no SOI    A/P:  57 y.o. male s/p excision of back cyst who presents for follow up  -Bandage removed  -Wound care instructions given  -RTC 2 weeks     Ericka Tirado MD  General Surgery   [phone]        8/1/2023  7:11 PM      "

## 2023-08-07 ENCOUNTER — OFFICE VISIT (OUTPATIENT)
Dept: SURGERY | Facility: CLINIC | Age: 58
End: 2023-08-07
Payer: MEDICAID

## 2023-08-07 VITALS
DIASTOLIC BLOOD PRESSURE: 53 MMHG | SYSTOLIC BLOOD PRESSURE: 118 MMHG | HEIGHT: 71 IN | HEART RATE: 53 BPM | WEIGHT: 189.06 LBS | OXYGEN SATURATION: 97 % | BODY MASS INDEX: 26.47 KG/M2

## 2023-08-07 DIAGNOSIS — D23.5 DERMOID CYST OF SKIN OF BACK: Primary | ICD-10-CM

## 2023-08-07 PROCEDURE — 99024 PR POST-OP FOLLOW-UP VISIT: ICD-10-PCS | Mod: ,,, | Performed by: STUDENT IN AN ORGANIZED HEALTH CARE EDUCATION/TRAINING PROGRAM

## 2023-08-07 PROCEDURE — 99999 PR PBB SHADOW E&M-EST. PATIENT-LVL IV: ICD-10-PCS | Mod: PBBFAC,,, | Performed by: STUDENT IN AN ORGANIZED HEALTH CARE EDUCATION/TRAINING PROGRAM

## 2023-08-07 PROCEDURE — 1159F MED LIST DOCD IN RCRD: CPT | Mod: CPTII,,, | Performed by: STUDENT IN AN ORGANIZED HEALTH CARE EDUCATION/TRAINING PROGRAM

## 2023-08-07 PROCEDURE — 1159F PR MEDICATION LIST DOCUMENTED IN MEDICAL RECORD: ICD-10-PCS | Mod: CPTII,,, | Performed by: STUDENT IN AN ORGANIZED HEALTH CARE EDUCATION/TRAINING PROGRAM

## 2023-08-07 PROCEDURE — 3008F BODY MASS INDEX DOCD: CPT | Mod: CPTII,,, | Performed by: STUDENT IN AN ORGANIZED HEALTH CARE EDUCATION/TRAINING PROGRAM

## 2023-08-07 PROCEDURE — 99214 OFFICE O/P EST MOD 30 MIN: CPT | Mod: PBBFAC | Performed by: STUDENT IN AN ORGANIZED HEALTH CARE EDUCATION/TRAINING PROGRAM

## 2023-08-07 PROCEDURE — 99999 PR PBB SHADOW E&M-EST. PATIENT-LVL IV: CPT | Mod: PBBFAC,,, | Performed by: STUDENT IN AN ORGANIZED HEALTH CARE EDUCATION/TRAINING PROGRAM

## 2023-08-07 PROCEDURE — 99024 POSTOP FOLLOW-UP VISIT: CPT | Mod: ,,, | Performed by: STUDENT IN AN ORGANIZED HEALTH CARE EDUCATION/TRAINING PROGRAM

## 2023-08-07 PROCEDURE — 3074F SYST BP LT 130 MM HG: CPT | Mod: CPTII,,, | Performed by: STUDENT IN AN ORGANIZED HEALTH CARE EDUCATION/TRAINING PROGRAM

## 2023-08-07 PROCEDURE — 3078F PR MOST RECENT DIASTOLIC BLOOD PRESSURE < 80 MM HG: ICD-10-PCS | Mod: CPTII,,, | Performed by: STUDENT IN AN ORGANIZED HEALTH CARE EDUCATION/TRAINING PROGRAM

## 2023-08-07 PROCEDURE — 3078F DIAST BP <80 MM HG: CPT | Mod: CPTII,,, | Performed by: STUDENT IN AN ORGANIZED HEALTH CARE EDUCATION/TRAINING PROGRAM

## 2023-08-07 PROCEDURE — 3008F PR BODY MASS INDEX (BMI) DOCUMENTED: ICD-10-PCS | Mod: CPTII,,, | Performed by: STUDENT IN AN ORGANIZED HEALTH CARE EDUCATION/TRAINING PROGRAM

## 2023-08-07 PROCEDURE — 3074F PR MOST RECENT SYSTOLIC BLOOD PRESSURE < 130 MM HG: ICD-10-PCS | Mod: CPTII,,, | Performed by: STUDENT IN AN ORGANIZED HEALTH CARE EDUCATION/TRAINING PROGRAM

## 2023-08-07 NOTE — PROGRESS NOTES
[facility]  General Surgery Clinic Progress Note    HPI:  Arnulfo Fay is a 57 y.o. male with history of back cyst s/p excision who presents for follow up.  Says back is itching.  Voices no other complaints.     ROS:  Negative except above    PE:  There were no vitals filed for this visit.       Gen: Alert and oriented x3, judgement intact, NAD  CV: RRR  Resp: CTAB; breaths non-labored and symmetrical  Abd: Soft, NT, ND, BS+  Extremities: No c/c/e  Skin: Mid back incision well healed with sutures in place     Pathology:      A/P:  57 y.o. male s/p excision of back cyst who presents for follow up  -Sutures removed   -Path benign  -RTC PRN     Ericka Tirado MD  General Surgery   [phone]        8/7/2023  7:11 PM

## 2023-08-29 ENCOUNTER — HOSPITAL ENCOUNTER (EMERGENCY)
Facility: HOSPITAL | Age: 58
Discharge: HOME | End: 2023-08-29
Attending: STUDENT IN AN ORGANIZED HEALTH CARE EDUCATION/TRAINING PROGRAM
Payer: MEDICAID

## 2023-08-29 VITALS
HEART RATE: 60 BPM | SYSTOLIC BLOOD PRESSURE: 176 MMHG | WEIGHT: 188 LBS | DIASTOLIC BLOOD PRESSURE: 88 MMHG | TEMPERATURE: 99 F | HEIGHT: 71 IN | BODY MASS INDEX: 26.32 KG/M2 | RESPIRATION RATE: 18 BRPM | OXYGEN SATURATION: 97 %

## 2023-08-29 DIAGNOSIS — J06.9 VIRAL URI WITH COUGH: Primary | ICD-10-CM

## 2023-08-29 LAB
CTP QC/QA: YES
SARS-COV-2 RDRP RESP QL NAA+PROBE: NEGATIVE

## 2023-08-29 PROCEDURE — 99284 EMERGENCY DEPT VISIT MOD MDM: CPT

## 2023-08-29 PROCEDURE — 87635 SARS-COV-2 COVID-19 AMP PRB: CPT | Performed by: CLINICAL NURSE SPECIALIST

## 2023-08-29 RX ORDER — CETIRIZINE HYDROCHLORIDE 10 MG/1
10 TABLET ORAL DAILY
Qty: 30 TABLET | Refills: 0 | Status: SHIPPED | OUTPATIENT
Start: 2023-08-29 | End: 2024-08-28

## 2023-08-29 RX ORDER — PROMETHAZINE HYDROCHLORIDE AND DEXTROMETHORPHAN HYDROBROMIDE 6.25; 15 MG/5ML; MG/5ML
10 SYRUP ORAL EVERY 6 HOURS PRN
Qty: 120 ML | Refills: 0 | Status: SHIPPED | OUTPATIENT
Start: 2023-08-29 | End: 2023-09-08

## 2023-08-29 RX ORDER — IBUPROFEN 800 MG/1
800 TABLET ORAL EVERY 6 HOURS PRN
Qty: 20 TABLET | Refills: 0 | OUTPATIENT
Start: 2023-08-29 | End: 2024-02-22
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
## 2023-10-16 ENCOUNTER — HOSPITAL ENCOUNTER (EMERGENCY)
Facility: HOSPITAL | Age: 58
Discharge: HOME | End: 2023-10-16
Attending: EMERGENCY MEDICINE
Payer: MEDICAID

## 2023-10-16 VITALS
HEIGHT: 71 IN | RESPIRATION RATE: 20 BRPM | HEART RATE: 86 BPM | WEIGHT: 192 LBS | DIASTOLIC BLOOD PRESSURE: 93 MMHG | TEMPERATURE: 98 F | OXYGEN SATURATION: 98 % | SYSTOLIC BLOOD PRESSURE: 141 MMHG | BODY MASS INDEX: 26.88 KG/M2

## 2023-10-16 DIAGNOSIS — R07.9 CHEST PAIN: ICD-10-CM

## 2023-10-16 DIAGNOSIS — K21.9 GASTROESOPHAGEAL REFLUX DISEASE, UNSPECIFIED WHETHER ESOPHAGITIS PRESENT: Primary | ICD-10-CM

## 2023-10-16 LAB
ALBUMIN SERPL BCP-MCNC: 3.7 G/DL (ref 3.5–5.2)
ALP SERPL-CCNC: 161 U/L (ref 55–135)
ALT SERPL W/O P-5'-P-CCNC: 25 U/L (ref 10–44)
ANION GAP SERPL CALC-SCNC: 8 MMOL/L (ref 3–11)
AST SERPL-CCNC: 12 U/L (ref 10–40)
BASOPHILS # BLD AUTO: 0.03 K/UL (ref 0–0.2)
BASOPHILS NFR BLD: 0.3 % (ref 0–1.9)
BILIRUB SERPL-MCNC: 0.1 MG/DL (ref 0.1–1)
BUN SERPL-MCNC: 13 MG/DL (ref 6–20)
CALCIUM SERPL-MCNC: 8.4 MG/DL (ref 8.7–10.5)
CHLORIDE SERPL-SCNC: 102 MMOL/L (ref 95–110)
CO2 SERPL-SCNC: 30 MMOL/L (ref 23–29)
CREAT SERPL-MCNC: 0.8 MG/DL (ref 0.5–1.4)
DIFFERENTIAL METHOD: ABNORMAL
EOSINOPHIL # BLD AUTO: 0.4 K/UL (ref 0–0.5)
EOSINOPHIL NFR BLD: 4.3 % (ref 0–8)
ERYTHROCYTE [DISTWIDTH] IN BLOOD BY AUTOMATED COUNT: 12 % (ref 11.5–14.5)
EST. GFR  (NO RACE VARIABLE): >60 ML/MIN/1.73 M^2
GLUCOSE SERPL-MCNC: 124 MG/DL (ref 70–110)
HCT VFR BLD AUTO: 38 % (ref 40–54)
HGB BLD-MCNC: 12.7 G/DL (ref 14–18)
IMM GRANULOCYTES # BLD AUTO: 0.02 K/UL (ref 0–0.04)
IMM GRANULOCYTES NFR BLD AUTO: 0.2 % (ref 0–0.5)
LIPASE SERPL-CCNC: 66 U/L (ref 13–75)
LYMPHOCYTES # BLD AUTO: 2.4 K/UL (ref 1–4.8)
LYMPHOCYTES NFR BLD: 25.1 % (ref 18–48)
MCH RBC QN AUTO: 30.7 PG (ref 27–31)
MCHC RBC AUTO-ENTMCNC: 33.4 G/DL (ref 32–36)
MCV RBC AUTO: 92 FL (ref 82–98)
MONOCYTES # BLD AUTO: 0.7 K/UL (ref 0.3–1)
MONOCYTES NFR BLD: 7.1 % (ref 4–15)
NEUTROPHILS # BLD AUTO: 6 K/UL (ref 1.8–7.7)
NEUTROPHILS NFR BLD: 63 % (ref 38–73)
NRBC BLD-RTO: 0 /100 WBC
PLATELET # BLD AUTO: 228 K/UL (ref 150–450)
PMV BLD AUTO: 9.6 FL (ref 9.2–12.9)
POTASSIUM SERPL-SCNC: 3.8 MMOL/L (ref 3.5–5.1)
PROT SERPL-MCNC: 7.5 G/DL (ref 6–8.4)
RBC # BLD AUTO: 4.14 M/UL (ref 4.6–6.2)
SODIUM SERPL-SCNC: 140 MMOL/L (ref 136–145)
TROPONIN I SERPL DL<=0.01 NG/ML-MCNC: 8.7 PG/ML (ref 0–60)
WBC # BLD AUTO: 9.47 K/UL (ref 3.9–12.7)

## 2023-10-16 PROCEDURE — 83690 ASSAY OF LIPASE: CPT | Performed by: NURSE PRACTITIONER

## 2023-10-16 PROCEDURE — 85025 COMPLETE CBC W/AUTO DIFF WBC: CPT | Performed by: NURSE PRACTITIONER

## 2023-10-16 PROCEDURE — 93010 EKG 12-LEAD: ICD-10-PCS | Mod: ,,, | Performed by: INTERNAL MEDICINE

## 2023-10-16 PROCEDURE — 25000003 PHARM REV CODE 250: Performed by: NURSE PRACTITIONER

## 2023-10-16 PROCEDURE — 93005 ELECTROCARDIOGRAM TRACING: CPT

## 2023-10-16 PROCEDURE — 99284 EMERGENCY DEPT VISIT MOD MDM: CPT

## 2023-10-16 PROCEDURE — 84484 ASSAY OF TROPONIN QUANT: CPT | Performed by: NURSE PRACTITIONER

## 2023-10-16 PROCEDURE — 80053 COMPREHEN METABOLIC PANEL: CPT | Performed by: NURSE PRACTITIONER

## 2023-10-16 PROCEDURE — 36415 COLL VENOUS BLD VENIPUNCTURE: CPT | Performed by: NURSE PRACTITIONER

## 2023-10-16 PROCEDURE — 93010 ELECTROCARDIOGRAM REPORT: CPT | Mod: ,,, | Performed by: INTERNAL MEDICINE

## 2023-10-16 RX ORDER — MAG HYDROX/ALUMINUM HYD/SIMETH 200-200-20
30 SUSPENSION, ORAL (FINAL DOSE FORM) ORAL ONCE
Status: DISCONTINUED | OUTPATIENT
Start: 2023-10-16 | End: 2023-10-16

## 2023-10-16 RX ORDER — SUCRALFATE 1 G/1
1 TABLET ORAL
Qty: 40 TABLET | Refills: 0 | Status: SHIPPED | OUTPATIENT
Start: 2023-10-16 | End: 2023-10-26

## 2023-10-16 RX ORDER — MAG HYDROX/ALUMINUM HYD/SIMETH 200-200-20
30 SUSPENSION, ORAL (FINAL DOSE FORM) ORAL
Status: COMPLETED | OUTPATIENT
Start: 2023-10-16 | End: 2023-10-16

## 2023-10-16 RX ADMIN — ALUMINUM HYDROXIDE, MAGNESIUM HYDROXIDE, AND SIMETHICONE 30 ML: 200; 200; 20 SUSPENSION ORAL at 05:10
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
## 2023-10-16 NOTE — DISCHARGE INSTRUCTIONS
Take medication as directed.  It is important that you follow-up with your primary care doctor for further evaluation and management of your symptoms.  Return to the emergency department for worsening condition.

## 2023-10-23 ENCOUNTER — OFFICE VISIT (OUTPATIENT)
Dept: ORTHOPEDICS | Facility: CLINIC | Age: 58
End: 2023-10-23
Payer: MEDICAID

## 2023-10-23 DIAGNOSIS — M17.0 PRIMARY OSTEOARTHRITIS OF BOTH KNEES: Primary | ICD-10-CM

## 2023-10-23 PROCEDURE — 20610 DRAIN/INJ JOINT/BURSA W/O US: CPT | Mod: PBBFAC,RT | Performed by: NURSE PRACTITIONER

## 2023-10-23 PROCEDURE — 1159F PR MEDICATION LIST DOCUMENTED IN MEDICAL RECORD: ICD-10-PCS | Mod: CPTII,,, | Performed by: NURSE PRACTITIONER

## 2023-10-23 PROCEDURE — 99999 PR PBB SHADOW E&M-EST. PATIENT-LVL III: CPT | Mod: PBBFAC,,, | Performed by: NURSE PRACTITIONER

## 2023-10-23 PROCEDURE — 99213 PR OFFICE/OUTPT VISIT, EST, LEVL III, 20-29 MIN: ICD-10-PCS | Mod: S$PBB,25,, | Performed by: NURSE PRACTITIONER

## 2023-10-23 PROCEDURE — 20610 DRAIN/INJ JOINT/BURSA W/O US: CPT | Mod: S$PBB,RT,, | Performed by: NURSE PRACTITIONER

## 2023-10-23 PROCEDURE — 1160F PR REVIEW ALL MEDS BY PRESCRIBER/CLIN PHARMACIST DOCUMENTED: ICD-10-PCS | Mod: CPTII,,, | Performed by: NURSE PRACTITIONER

## 2023-10-23 PROCEDURE — 99213 OFFICE O/P EST LOW 20 MIN: CPT | Mod: PBBFAC,25 | Performed by: NURSE PRACTITIONER

## 2023-10-23 PROCEDURE — 1160F RVW MEDS BY RX/DR IN RCRD: CPT | Mod: CPTII,,, | Performed by: NURSE PRACTITIONER

## 2023-10-23 PROCEDURE — 99999 PR PBB SHADOW E&M-EST. PATIENT-LVL III: ICD-10-PCS | Mod: PBBFAC,,, | Performed by: NURSE PRACTITIONER

## 2023-10-23 PROCEDURE — 99999PBSHW PR PBB SHADOW TECHNICAL ONLY FILED TO HB: ICD-10-PCS | Mod: PBBFAC,,,

## 2023-10-23 PROCEDURE — 1159F MED LIST DOCD IN RCRD: CPT | Mod: CPTII,,, | Performed by: NURSE PRACTITIONER

## 2023-10-23 PROCEDURE — 99999PBSHW PR PBB SHADOW TECHNICAL ONLY FILED TO HB: Mod: PBBFAC,,,

## 2023-10-23 PROCEDURE — 99213 OFFICE O/P EST LOW 20 MIN: CPT | Mod: S$PBB,25,, | Performed by: NURSE PRACTITIONER

## 2023-10-23 PROCEDURE — 20610 LARGE JOINT ASPIRATION/INJECTION: R KNEE: ICD-10-PCS | Mod: S$PBB,RT,, | Performed by: NURSE PRACTITIONER

## 2023-10-23 RX ORDER — BETAMETHASONE SODIUM PHOSPHATE AND BETAMETHASONE ACETATE 3; 3 MG/ML; MG/ML
6 INJECTION, SUSPENSION INTRA-ARTICULAR; INTRALESIONAL; INTRAMUSCULAR; SOFT TISSUE
Status: DISCONTINUED | OUTPATIENT
Start: 2023-10-23 | End: 2023-10-23 | Stop reason: HOSPADM

## 2023-10-23 RX ADMIN — BETAMETHASONE SODIUM PHOSPHATE AND BETAMETHASONE ACETATE 6 MG: 3; 3 INJECTION, SUSPENSION INTRA-ARTICULAR; INTRALESIONAL; INTRAMUSCULAR; SOFT TISSUE at 01:10

## 2023-10-23 NOTE — PROGRESS NOTES
Subjective:      Follow up: Bilateral knee pain      Arnulfo Fay is a 57 y.o. male returns for follow up for chronic bilateral knee pain. He presents and again is having pain and swelling in both knees. He rates his pain as 7/10 in the right knee and 5/10 in the left knee. He states that the steroid injection had helped a lot in both knees last time he had injections done > 9 months ago. Symptoms worsen with activity, getting up from a chair, weight bearing, sitting for prolonged periods of time.  He is noted with a mild limp.         Review of Systems   Constitutional: Negative.  Negative for fever.   Musculoskeletal:  Positive for joint pain.   Skin: Negative.    Neurological: Negative.    Psychiatric/Behavioral: Negative.     All other systems reviewed and are negative.      Objective:      NVI  General :   alert, appears stated age, and cooperative   Gait: Antalgic- mild   Bilateral Lower Extremity  Hip Palpation:  no tenderness over the greater trochanters   Hip ROM: 100% of normal    Knee Effusion:  0-1 + knees   Ecchymosis:  none   Knee ROM:  5 to 110 degrees with subpatellar crepitance B/L.   Patella:  Patella does track normally.  Patellar apprehension test: negative  Patellar compression test: negative   Tenderness: medial joint line B/L   Stability:  Lachman's test: negative  Posterior drawer: negative  Medial collateral ligament: negative  Lateral collateral ligament: negative     Dominique Test:  negative   Melody's Test:  Present in left knee- mild pain   Sensation:   intact to light touch   Pulses: normal DP and PT pulses.    Mild quad weakness noted.   Brisk cap refill.      Imaging  None today    Assessment:      Bilateral knee pain, DJD, chondrocalcinosis     Plan:      Right knee intra-articular steroid injection done, see procedure note.  Continue home physician directed exercises.   Ice and Nsaid as needed. Limit Stairs and squatting.   Continue knee brace for joint support and compression.    Follow up: 2 weeks for left knee CSI.  He had no further questions.      PROCEDURE: Right knee intra-articular steroid injection:  I have explained the risks, benefits, and alternatives of the procedure in detail.  The patient voices understanding and all questions have been answered.  The patient agrees to proceed as planned.      So after I performed a sterile prep of the skin in the normal fashion, the right knee is injected using a 22 gauge needle from the anterolateral approach with a combination of 3cc 1% plain lidocaine, 0.5% marcaine and 6mg of celestone .       The patient is cautioned and immediate relief of pain is secondary to the local anesthetic and will be temporary.  After the anesthetic wears off there may be a increase in pain that may last for a few hours or a few days and they should use ice to help alleviate this flair up of pain.

## 2023-10-23 NOTE — PROCEDURES
Large Joint Aspiration/Injection: R knee    Date/Time: 10/23/2023 1:30 PM    Performed by: Mathew Valdes NP  Authorized by: Mathew Valdes NP    Consent Done?:  Yes (Written)  Indications:  Arthritis and pain  Site marked: the procedure site was marked    Timeout: prior to procedure the correct patient, procedure, and site was verified    Prep: patient was prepped and draped in usual sterile fashion      Local anesthesia used?: Yes    Local anesthetic:  Bupivacaine 0.5% without epinephrine and lidocaine 1% without epinephrine  Anesthetic total (ml):  2      Details:  Needle Size:  22 G  Ultrasonic Guidance for needle placement?: No    Approach:  Lateral  Location:  Knee  Site:  R knee  Medications:  6 mg betamethasone acetate-betamethasone sodium phosphate 6 mg/mL  Aspirate amount (mL):  3  Aspirate:  Clear  Patient tolerance:  Patient tolerated the procedure well with no immediate complications     Risks reviewed: pain, swelling, infection and nerve or tendon injury.     LILY Ahn MA in room

## 2023-11-06 ENCOUNTER — OFFICE VISIT (OUTPATIENT)
Dept: ORTHOPEDICS | Facility: CLINIC | Age: 58
End: 2023-11-06
Payer: MEDICAID

## 2023-11-06 DIAGNOSIS — M17.0 PRIMARY OSTEOARTHRITIS OF BOTH KNEES: Primary | ICD-10-CM

## 2023-11-06 PROCEDURE — 20610 DRAIN/INJ JOINT/BURSA W/O US: CPT | Mod: PBBFAC,LT | Performed by: NURSE PRACTITIONER

## 2023-11-06 PROCEDURE — 99999 PR PBB SHADOW E&M-EST. PATIENT-LVL III: ICD-10-PCS | Mod: PBBFAC,,, | Performed by: NURSE PRACTITIONER

## 2023-11-06 PROCEDURE — 99999 PR PBB SHADOW E&M-EST. PATIENT-LVL III: CPT | Mod: PBBFAC,,, | Performed by: NURSE PRACTITIONER

## 2023-11-06 PROCEDURE — 99999PBSHW PR PBB SHADOW TECHNICAL ONLY FILED TO HB: Mod: PBBFAC,,,

## 2023-11-06 PROCEDURE — 20610 DRAIN/INJ JOINT/BURSA W/O US: CPT | Mod: S$PBB,LT,, | Performed by: NURSE PRACTITIONER

## 2023-11-06 PROCEDURE — 99499 NO LOS: ICD-10-PCS | Mod: S$PBB,,, | Performed by: NURSE PRACTITIONER

## 2023-11-06 PROCEDURE — 99499 UNLISTED E&M SERVICE: CPT | Mod: S$PBB,,, | Performed by: NURSE PRACTITIONER

## 2023-11-06 PROCEDURE — 20610 LARGE JOINT ASPIRATION/INJECTION: L KNEE: ICD-10-PCS | Mod: S$PBB,LT,, | Performed by: NURSE PRACTITIONER

## 2023-11-06 PROCEDURE — 99213 OFFICE O/P EST LOW 20 MIN: CPT | Mod: PBBFAC,25 | Performed by: NURSE PRACTITIONER

## 2023-11-06 PROCEDURE — 99999PBSHW PR PBB SHADOW TECHNICAL ONLY FILED TO HB: ICD-10-PCS | Mod: PBBFAC,,,

## 2023-11-06 RX ORDER — BETAMETHASONE SODIUM PHOSPHATE AND BETAMETHASONE ACETATE 3; 3 MG/ML; MG/ML
6 INJECTION, SUSPENSION INTRA-ARTICULAR; INTRALESIONAL; INTRAMUSCULAR; SOFT TISSUE
Status: DISCONTINUED | OUTPATIENT
Start: 2023-11-06 | End: 2023-11-06 | Stop reason: HOSPADM

## 2023-11-06 RX ADMIN — BETAMETHASONE SODIUM PHOSPHATE AND BETAMETHASONE ACETATE 6 MG: 3; 3 INJECTION, SUSPENSION INTRA-ARTICULAR; INTRALESIONAL; INTRAMUSCULAR; SOFT TISSUE at 10:11

## 2023-11-06 NOTE — PROGRESS NOTES
Subjective:      Follow up: Bilateral knee pain      Arnulfo Fay is a 57 y.o. male returns for follow up for chronic bilateral knee pain. He presents and states that the right knee is doing better post steroid injection. He is here for left knee steroid injection today. He rates his pain as 5/10 in the right knee and 2/10 in the left knee.  Symptoms worsen with activity, getting up from a chair, weight bearing, sitting for prolonged periods of time.  He is noted with a mild limp.         Review of Systems   Constitutional: Negative.  Negative for fever.   Musculoskeletal:  Positive for joint pain.   Skin: Negative.    Neurological: Negative.    Psychiatric/Behavioral: Negative.     All other systems reviewed and are negative.      Objective:      NVI  General :   alert, appears stated age, and cooperative   Gait: Antalgic- mild   Bilateral Lower Extremity  Hip Palpation:  no tenderness over the greater trochanters   Hip ROM: 100% of normal    Knee Effusion:  0-1 + knees   Ecchymosis:  none   Knee ROM:  5 to 110 degrees with subpatellar crepitance B/L.   Patella:  Patella does track normally.  Patellar apprehension test: negative  Patellar compression test: negative   Tenderness: medial joint line B/L   Stability:  Lachman's test: negative  Posterior drawer: negative  Medial collateral ligament: negative  Lateral collateral ligament: negative     Dominique Test:  negative   Melody's Test:  Present in left knee- mild pain   Sensation:   intact to light touch   Pulses: normal DP and PT pulses.    Mild quad weakness noted.   Brisk cap refill.      Imaging  None today     Assessment:      Bilateral knee pain, DJD, chondrocalcinosis     Plan:      Left knee intra-articular steroid injection done, see procedure note.  Continue home physician directed exercises.   Ice and Nsaid as needed. Limit Stairs and squatting.   Continue knee brace for joint support and compression.   Follow up: 4 months  He had no further  questions.      PROCEDURE: Left knee intra-articular steroid injection:  I have explained the risks, benefits, and alternatives of the procedure in detail.  The patient voices understanding and all questions have been answered.  The patient agrees to proceed as planned.      So after I performed a sterile prep of the skin in the normal fashion, the left knee is injected using a 22 gauge needle from the anterolateral approach with a combination of 3cc 1% plain lidocaine, 0.5% marcaine and 6mg of celestone .       The patient is cautioned and immediate relief of pain is secondary to the local anesthetic and will be temporary.  After the anesthetic wears off there may be a increase in pain that may last for a few hours or a few days and they should use ice to help alleviate this flair up of pain.

## 2023-11-06 NOTE — PROCEDURES
Large Joint Aspiration/Injection: L knee    Date/Time: 11/6/2023 10:00 AM    Performed by: Mathew Valdes NP  Authorized by: Mathew Valdes NP    Consent Done?:  Yes (Written)  Indications:  Arthritis  Site marked: the procedure site was marked    Timeout: prior to procedure the correct patient, procedure, and site was verified    Prep: patient was prepped and draped in usual sterile fashion      Local anesthesia used?: Yes    Local anesthetic:  Bupivacaine 0.5% without epinephrine and lidocaine 1% without epinephrine  Anesthetic total (ml):  2      Details:  Needle Size:  22 G  Ultrasonic Guidance for needle placement?: No    Approach:  Lateral  Location:  Knee  Site:  L knee  Medications:  6 mg betamethasone acetate-betamethasone sodium phosphate 6 mg/mL  Aspirate amount (mL):  3  Aspirate:  Clear  Patient tolerance:  Patient tolerated the procedure well with no immediate complications     Risks reviewed: pain, swelling, infection and nerve or tendon injury.     LILY Bernard MA in room.

## 2023-11-12 ENCOUNTER — HOSPITAL ENCOUNTER (EMERGENCY)
Facility: HOSPITAL | Age: 58
Discharge: HOME | End: 2023-11-12
Attending: EMERGENCY MEDICINE
Payer: MEDICAID

## 2023-11-12 VITALS
OXYGEN SATURATION: 97 % | DIASTOLIC BLOOD PRESSURE: 83 MMHG | WEIGHT: 185 LBS | RESPIRATION RATE: 18 BRPM | BODY MASS INDEX: 25.8 KG/M2 | HEART RATE: 84 BPM | SYSTOLIC BLOOD PRESSURE: 140 MMHG | TEMPERATURE: 99 F

## 2023-11-12 DIAGNOSIS — B34.9 VIRAL SYNDROME: Primary | ICD-10-CM

## 2023-11-12 LAB
CTP QC/QA: YES
INFLUENZA A, MOLECULAR: NEGATIVE
INFLUENZA B, MOLECULAR: NEGATIVE
SARS-COV-2 RDRP RESP QL NAA+PROBE: NEGATIVE
SPECIMEN SOURCE: NORMAL

## 2023-11-12 PROCEDURE — 87635 SARS-COV-2 COVID-19 AMP PRB: CPT | Performed by: EMERGENCY MEDICINE

## 2023-11-12 PROCEDURE — 87502 INFLUENZA DNA AMP PROBE: CPT | Performed by: EMERGENCY MEDICINE

## 2023-11-12 PROCEDURE — 25000003 PHARM REV CODE 250: Performed by: EMERGENCY MEDICINE

## 2023-11-12 PROCEDURE — 99284 EMERGENCY DEPT VISIT MOD MDM: CPT

## 2023-11-12 RX ORDER — BENZONATATE 100 MG/1
200 CAPSULE ORAL
Status: COMPLETED | OUTPATIENT
Start: 2023-11-12 | End: 2023-11-12

## 2023-11-12 RX ORDER — LOPERAMIDE HYDROCHLORIDE 2 MG/1
2 CAPSULE ORAL 4 TIMES DAILY PRN
Qty: 12 CAPSULE | Refills: 0 | Status: SHIPPED | OUTPATIENT
Start: 2023-11-12 | End: 2023-11-22

## 2023-11-12 RX ORDER — BENZONATATE 100 MG/1
100 CAPSULE ORAL 3 TIMES DAILY PRN
Qty: 20 CAPSULE | Refills: 0 | Status: SHIPPED | OUTPATIENT
Start: 2023-11-12 | End: 2023-11-22

## 2023-11-12 RX ADMIN — BENZONATATE 200 MG: 100 CAPSULE ORAL at 08:11
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
## 2024-01-19 ENCOUNTER — HOSPITAL ENCOUNTER (EMERGENCY)
Facility: HOSPITAL | Age: 59
Discharge: HOME | End: 2024-01-19
Attending: STUDENT IN AN ORGANIZED HEALTH CARE EDUCATION/TRAINING PROGRAM
Payer: MEDICAID

## 2024-01-19 VITALS
DIASTOLIC BLOOD PRESSURE: 67 MMHG | HEIGHT: 71 IN | RESPIRATION RATE: 18 BRPM | SYSTOLIC BLOOD PRESSURE: 109 MMHG | BODY MASS INDEX: 27.58 KG/M2 | TEMPERATURE: 98 F | HEART RATE: 94 BPM | OXYGEN SATURATION: 95 % | WEIGHT: 197 LBS

## 2024-01-19 DIAGNOSIS — R56.9 SEIZURE: ICD-10-CM

## 2024-01-19 DIAGNOSIS — R56.9 SEIZURE-LIKE ACTIVITY: Primary | ICD-10-CM

## 2024-01-19 LAB
ALBUMIN SERPL BCP-MCNC: 4 G/DL (ref 3.5–5.2)
ALP SERPL-CCNC: 148 U/L (ref 55–135)
ALT SERPL W/O P-5'-P-CCNC: 27 U/L (ref 10–44)
AMPHET+METHAMPHET UR QL: NEGATIVE
ANION GAP SERPL CALC-SCNC: 3 MMOL/L (ref 3–11)
APAP SERPL-MCNC: <2 UG/ML (ref 10–20)
AST SERPL-CCNC: 13 U/L (ref 10–40)
BARBITURATES UR QL SCN>200 NG/ML: NEGATIVE
BASOPHILS # BLD AUTO: 0.03 K/UL (ref 0–0.2)
BASOPHILS NFR BLD: 0.3 % (ref 0–1.9)
BENZODIAZ UR QL SCN>200 NG/ML: NEGATIVE
BILIRUB SERPL-MCNC: 0.2 MG/DL (ref 0.1–1)
BILIRUB UR QL STRIP: NEGATIVE
BUN SERPL-MCNC: 9 MG/DL (ref 6–20)
BZE UR QL SCN: NEGATIVE
CALCIUM SERPL-MCNC: 8.3 MG/DL (ref 8.7–10.5)
CANNABINOIDS UR QL SCN: NEGATIVE
CHLORIDE SERPL-SCNC: 106 MMOL/L (ref 95–110)
CLARITY UR: CLEAR
CO2 SERPL-SCNC: 30 MMOL/L (ref 23–29)
COLOR UR: YELLOW
CREAT SERPL-MCNC: 0.6 MG/DL (ref 0.5–1.4)
CREAT UR-MCNC: 131 MG/DL (ref 23–375)
DIFFERENTIAL METHOD BLD: ABNORMAL
EOSINOPHIL # BLD AUTO: 0.4 K/UL (ref 0–0.5)
EOSINOPHIL NFR BLD: 4.3 % (ref 0–8)
ERYTHROCYTE [DISTWIDTH] IN BLOOD BY AUTOMATED COUNT: 11.6 % (ref 11.5–14.5)
EST. GFR  (NO RACE VARIABLE): >60 ML/MIN/1.73 M^2
ETHANOL SERPL-MCNC: <3 MG/DL
GLUCOSE SERPL-MCNC: 107 MG/DL (ref 70–110)
GLUCOSE UR QL STRIP: NEGATIVE
HCT VFR BLD AUTO: 39.5 % (ref 40–54)
HGB BLD-MCNC: 13.2 G/DL (ref 14–18)
HGB UR QL STRIP: NEGATIVE
IMM GRANULOCYTES # BLD AUTO: 0.02 K/UL (ref 0–0.04)
IMM GRANULOCYTES NFR BLD AUTO: 0.2 % (ref 0–0.5)
KETONES UR QL STRIP: NEGATIVE
LEUKOCYTE ESTERASE UR QL STRIP: NEGATIVE
LYMPHOCYTES # BLD AUTO: 2.5 K/UL (ref 1–4.8)
LYMPHOCYTES NFR BLD: 28.5 % (ref 18–48)
MCH RBC QN AUTO: 30.6 PG (ref 27–31)
MCHC RBC AUTO-ENTMCNC: 33.4 G/DL (ref 32–36)
MCV RBC AUTO: 91 FL (ref 82–98)
METHADONE UR QL SCN>300 NG/ML: NEGATIVE
MONOCYTES # BLD AUTO: 0.7 K/UL (ref 0.3–1)
MONOCYTES NFR BLD: 7.9 % (ref 4–15)
NEUTROPHILS # BLD AUTO: 5 K/UL (ref 1.8–7.7)
NEUTROPHILS NFR BLD: 58.8 % (ref 38–73)
NITRITE UR QL STRIP: NEGATIVE
NRBC BLD-RTO: 0 /100 WBC
OPIATES UR QL SCN: NEGATIVE
PCP UR QL SCN>25 NG/ML: NEGATIVE
PH UR STRIP: 6 [PH] (ref 5–8)
PHENYTOIN SERPL-MCNC: 14.7 UG/ML (ref 10–20)
PLATELET # BLD AUTO: 228 K/UL (ref 150–450)
PMV BLD AUTO: 9.3 FL (ref 9.2–12.9)
POCT GLUCOSE: 93 MG/DL (ref 70–110)
POTASSIUM SERPL-SCNC: 4 MMOL/L (ref 3.5–5.1)
PROT SERPL-MCNC: 7.7 G/DL (ref 6–8.4)
PROT UR QL STRIP: NEGATIVE
RBC # BLD AUTO: 4.32 M/UL (ref 4.6–6.2)
SALICYLATES SERPL-MCNC: <1.7 MG/DL (ref 15–30)
SODIUM SERPL-SCNC: 139 MMOL/L (ref 136–145)
SP GR UR STRIP: 1.02 (ref 1–1.03)
TOXICOLOGY INFORMATION: NORMAL
URN SPEC COLLECT METH UR: NORMAL
UROBILINOGEN UR STRIP-ACNC: NEGATIVE EU/DL
WBC # BLD AUTO: 8.59 K/UL (ref 3.9–12.7)

## 2024-01-19 PROCEDURE — 80307 DRUG TEST PRSMV CHEM ANLYZR: CPT | Performed by: STUDENT IN AN ORGANIZED HEALTH CARE EDUCATION/TRAINING PROGRAM

## 2024-01-19 PROCEDURE — 82962 GLUCOSE BLOOD TEST: CPT

## 2024-01-19 PROCEDURE — 80143 DRUG ASSAY ACETAMINOPHEN: CPT | Performed by: STUDENT IN AN ORGANIZED HEALTH CARE EDUCATION/TRAINING PROGRAM

## 2024-01-19 PROCEDURE — 36415 COLL VENOUS BLD VENIPUNCTURE: CPT | Performed by: STUDENT IN AN ORGANIZED HEALTH CARE EDUCATION/TRAINING PROGRAM

## 2024-01-19 PROCEDURE — 80156 ASSAY CARBAMAZEPINE TOTAL: CPT | Performed by: STUDENT IN AN ORGANIZED HEALTH CARE EDUCATION/TRAINING PROGRAM

## 2024-01-19 PROCEDURE — 82077 ASSAY SPEC XCP UR&BREATH IA: CPT | Performed by: STUDENT IN AN ORGANIZED HEALTH CARE EDUCATION/TRAINING PROGRAM

## 2024-01-19 PROCEDURE — 85025 COMPLETE CBC W/AUTO DIFF WBC: CPT | Performed by: STUDENT IN AN ORGANIZED HEALTH CARE EDUCATION/TRAINING PROGRAM

## 2024-01-19 PROCEDURE — 93010 ELECTROCARDIOGRAM REPORT: CPT | Mod: ,,, | Performed by: INTERNAL MEDICINE

## 2024-01-19 PROCEDURE — 93005 ELECTROCARDIOGRAM TRACING: CPT

## 2024-01-19 PROCEDURE — 99284 EMERGENCY DEPT VISIT MOD MDM: CPT

## 2024-01-19 PROCEDURE — 80185 ASSAY OF PHENYTOIN TOTAL: CPT | Performed by: STUDENT IN AN ORGANIZED HEALTH CARE EDUCATION/TRAINING PROGRAM

## 2024-01-19 PROCEDURE — 25000003 PHARM REV CODE 250: Performed by: STUDENT IN AN ORGANIZED HEALTH CARE EDUCATION/TRAINING PROGRAM

## 2024-01-19 PROCEDURE — 80053 COMPREHEN METABOLIC PANEL: CPT | Performed by: STUDENT IN AN ORGANIZED HEALTH CARE EDUCATION/TRAINING PROGRAM

## 2024-01-19 PROCEDURE — 81003 URINALYSIS AUTO W/O SCOPE: CPT | Mod: 59 | Performed by: STUDENT IN AN ORGANIZED HEALTH CARE EDUCATION/TRAINING PROGRAM

## 2024-01-19 PROCEDURE — 80179 DRUG ASSAY SALICYLATE: CPT | Performed by: STUDENT IN AN ORGANIZED HEALTH CARE EDUCATION/TRAINING PROGRAM

## 2024-01-19 RX ORDER — PHENYTOIN SODIUM 100 MG/1
400 CAPSULE, EXTENDED RELEASE ORAL ONCE
Status: COMPLETED | OUTPATIENT
Start: 2024-01-19 | End: 2024-01-19

## 2024-01-19 RX ORDER — CARBAMAZEPINE 200 MG/1
800 TABLET ORAL ONCE
Status: DISCONTINUED | OUTPATIENT
Start: 2024-01-19 | End: 2024-01-19

## 2024-01-19 RX ADMIN — PHENYTOIN SODIUM 400 MG: 100 CAPSULE ORAL at 07:01

## 2024-01-20 LAB — CARBAMAZEPINE SERPL-MCNC: 7.6 UG/ML (ref 4–12)
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
## 2024-02-22 ENCOUNTER — HOSPITAL ENCOUNTER (EMERGENCY)
Facility: HOSPITAL | Age: 59
Discharge: HOME | End: 2024-02-22
Attending: EMERGENCY MEDICINE
Payer: MEDICAID

## 2024-02-22 VITALS
RESPIRATION RATE: 20 BRPM | BODY MASS INDEX: 25.87 KG/M2 | DIASTOLIC BLOOD PRESSURE: 68 MMHG | HEIGHT: 71 IN | OXYGEN SATURATION: 97 % | HEART RATE: 58 BPM | TEMPERATURE: 98 F | WEIGHT: 184.81 LBS | SYSTOLIC BLOOD PRESSURE: 123 MMHG

## 2024-02-22 DIAGNOSIS — K21.9 GERD (GASTROESOPHAGEAL REFLUX DISEASE): Primary | ICD-10-CM

## 2024-02-22 LAB — TROPONIN I SERPL DL<=0.01 NG/ML-MCNC: 10.7 PG/ML (ref 0–60)

## 2024-02-22 PROCEDURE — 36415 COLL VENOUS BLD VENIPUNCTURE: CPT | Performed by: EMERGENCY MEDICINE

## 2024-02-22 PROCEDURE — 93010 ELECTROCARDIOGRAM REPORT: CPT | Mod: ,,, | Performed by: INTERNAL MEDICINE

## 2024-02-22 PROCEDURE — 99284 EMERGENCY DEPT VISIT MOD MDM: CPT | Mod: 25

## 2024-02-22 PROCEDURE — 25000003 PHARM REV CODE 250: Performed by: EMERGENCY MEDICINE

## 2024-02-22 PROCEDURE — 84484 ASSAY OF TROPONIN QUANT: CPT | Performed by: EMERGENCY MEDICINE

## 2024-02-22 PROCEDURE — 93005 ELECTROCARDIOGRAM TRACING: CPT

## 2024-02-22 RX ORDER — ALUMINUM HYDROXIDE, MAGNESIUM HYDROXIDE, AND SIMETHICONE 1200; 120; 1200 MG/30ML; MG/30ML; MG/30ML
30 SUSPENSION ORAL ONCE
Status: COMPLETED | OUTPATIENT
Start: 2024-02-22 | End: 2024-02-22

## 2024-02-22 RX ORDER — FAMOTIDINE 20 MG/1
20 TABLET, FILM COATED ORAL 2 TIMES DAILY PRN
Qty: 60 TABLET | Refills: 0 | Status: SHIPPED | OUTPATIENT
Start: 2024-02-22 | End: 2024-05-22

## 2024-02-22 RX ORDER — LIDOCAINE HYDROCHLORIDE 20 MG/ML
15 SOLUTION OROPHARYNGEAL ONCE
Status: COMPLETED | OUTPATIENT
Start: 2024-02-22 | End: 2024-02-22

## 2024-02-22 RX ORDER — OMEPRAZOLE 40 MG/1
40 CAPSULE, DELAYED RELEASE ORAL
COMMUNITY
Start: 2024-01-04

## 2024-02-22 RX ADMIN — ALUMINUM HYDROXIDE, MAGNESIUM HYDROXIDE, AND SIMETHICONE 30 ML: 200; 200; 20 SUSPENSION ORAL at 08:02

## 2024-02-22 RX ADMIN — LIDOCAINE HYDROCHLORIDE 15 ML: 20 SOLUTION OROPHARYNGEAL at 08:02

## 2024-02-23 LAB
OHS QRS DURATION: 90 MS
OHS QTC CALCULATION: 429 MS
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
## 2024-03-06 ENCOUNTER — OFFICE VISIT (OUTPATIENT)
Dept: ORTHOPEDICS | Facility: CLINIC | Age: 59
End: 2024-03-06
Payer: MEDICAID

## 2024-03-06 DIAGNOSIS — M17.0 PRIMARY OSTEOARTHRITIS OF BOTH KNEES: Primary | ICD-10-CM

## 2024-03-06 DIAGNOSIS — M25.462 KNEE EFFUSION, LEFT: ICD-10-CM

## 2024-03-06 DIAGNOSIS — S83.242D ACUTE MEDIAL MENISCAL TEAR, LEFT, SUBSEQUENT ENCOUNTER: ICD-10-CM

## 2024-03-06 DIAGNOSIS — S83.282D OTHER TEAR OF LATERAL MENISCUS OF LEFT KNEE AS CURRENT INJURY, SUBSEQUENT ENCOUNTER: ICD-10-CM

## 2024-03-06 PROCEDURE — 1159F MED LIST DOCD IN RCRD: CPT | Mod: CPTII,,, | Performed by: NURSE PRACTITIONER

## 2024-03-06 PROCEDURE — 99213 OFFICE O/P EST LOW 20 MIN: CPT | Mod: S$PBB,,, | Performed by: NURSE PRACTITIONER

## 2024-03-06 PROCEDURE — 99999 PR PBB SHADOW E&M-EST. PATIENT-LVL III: CPT | Mod: PBBFAC,,, | Performed by: NURSE PRACTITIONER

## 2024-03-06 PROCEDURE — 1160F RVW MEDS BY RX/DR IN RCRD: CPT | Mod: CPTII,,, | Performed by: NURSE PRACTITIONER

## 2024-03-06 PROCEDURE — 99213 OFFICE O/P EST LOW 20 MIN: CPT | Mod: PBBFAC | Performed by: NURSE PRACTITIONER

## 2024-03-06 NOTE — PROGRESS NOTES
Subjective:      Follow up: Left knee pain      Arnulfo Fay is a 58 y.o. male returns for follow up for increased left sided knee pain. He states that he only had short term relief after the last steroid injection done. He presents and states that the right knee is doing well but the left continues with pain, swelling and giving way. He denies locking but is having catching and giving out with weight bearing. He rates his pain as 5/10 in the left knee. Symptoms worsen with activity, getting up from a chair, weight bearing, sitting for prolonged periods of time.  He is noted with a mild limp. Previous left knee MR showed a suspected complex tear of the medial meniscal body and posterior horn and a small lateral meniscal tear.       Review of Systems   Constitutional: Negative.  Negative for fever.   Musculoskeletal:  Positive for joint pain.   Skin: Negative.    Neurological: Negative.    Psychiatric/Behavioral: Negative.     All other systems reviewed and are negative.      Objective:      NVI  General :   alert, appears stated age, and cooperative   Gait: Antalgic- mild   Left Lower Extremity  Hip Palpation:  no tenderness over the greater trochanters   Hip ROM: 100% of normal    Knee Effusion:  0-1 + knee   Ecchymosis:  none   Knee ROM:  5 to 110 degrees with subpatellar crepitance   Patella:  Patella does track normally.  Patellar apprehension test: negative  Patellar compression test: negative   Tenderness: medial joint line > lateral   Stability:  Lachman's test: negative  Posterior drawer: negative  Medial collateral ligament: negative  Lateral collateral ligament: negative     Dominique Test:  negative   Melody's Test:  Present in left knee   Sensation:   intact to light touch   Pulses: normal DP and PT pulses.    Mild quad weakness noted.   Brisk cap refill.      Imaging  Previous MR reviewed from 12/2022   Suspected small radial tear at the junction of the body and anterior horn of the lateral  meniscus.   Thinning and abnormal contour of the medial meniscal body and posterior horn, suspicious for a complex tear.    Assessment:      Left knee pain, mild DJD and complex tear medial meniscus and small radial tear lateral meniscus     Plan:      Referral made to [facility] for evaluation for left knee Arthroscopy. He states that he is having progressing mechanical symptoms and conservative treatment is no longer providing relief. Previous MR done last year showed a suspected complex medial meniscal tear and small lateral meniscal tear.  Continue home physician directed exercises.   Ice and Nsaid as needed. Limit Stairs and squatting.   Continue knee brace for joint support and compression.   Follow up: prn  He had no further questions.

## 2024-03-21 ENCOUNTER — HOSPITAL ENCOUNTER (EMERGENCY)
Facility: HOSPITAL | Age: 59
Discharge: HOME | End: 2024-03-21
Attending: EMERGENCY MEDICINE
Payer: MEDICAID

## 2024-03-21 VITALS
TEMPERATURE: 98 F | RESPIRATION RATE: 18 BRPM | WEIGHT: 187 LBS | OXYGEN SATURATION: 98 % | HEIGHT: 71 IN | HEART RATE: 75 BPM | BODY MASS INDEX: 26.18 KG/M2 | DIASTOLIC BLOOD PRESSURE: 69 MMHG | SYSTOLIC BLOOD PRESSURE: 113 MMHG

## 2024-03-21 DIAGNOSIS — G89.29 OTHER CHRONIC PAIN: Primary | ICD-10-CM

## 2024-03-21 PROCEDURE — 25000003 PHARM REV CODE 250: Performed by: NURSE PRACTITIONER

## 2024-03-21 PROCEDURE — 96372 THER/PROPH/DIAG INJ SC/IM: CPT | Performed by: NURSE PRACTITIONER

## 2024-03-21 PROCEDURE — 99284 EMERGENCY DEPT VISIT MOD MDM: CPT | Mod: 25

## 2024-03-21 PROCEDURE — 63600175 PHARM REV CODE 636 W HCPCS: Performed by: NURSE PRACTITIONER

## 2024-03-21 RX ORDER — HYDROCODONE BITARTRATE AND ACETAMINOPHEN 10; 325 MG/1; MG/1
1 TABLET ORAL
Status: COMPLETED | OUTPATIENT
Start: 2024-03-21 | End: 2024-03-21

## 2024-03-21 RX ORDER — KETOROLAC TROMETHAMINE 30 MG/ML
30 INJECTION, SOLUTION INTRAMUSCULAR; INTRAVENOUS
Status: COMPLETED | OUTPATIENT
Start: 2024-03-21 | End: 2024-03-21

## 2024-03-21 RX ORDER — ORPHENADRINE CITRATE 30 MG/ML
60 INJECTION INTRAMUSCULAR; INTRAVENOUS
Status: COMPLETED | OUTPATIENT
Start: 2024-03-21 | End: 2024-03-21

## 2024-03-21 RX ADMIN — ORPHENADRINE CITRATE 60 MG: 60 INJECTION INTRAMUSCULAR; INTRAVENOUS at 05:03

## 2024-03-21 RX ADMIN — KETOROLAC TROMETHAMINE 30 MG: 30 INJECTION, SOLUTION INTRAMUSCULAR; INTRAVENOUS at 05:03

## 2024-03-21 RX ADMIN — HYDROCODONE BITARTRATE AND ACETAMINOPHEN 1 TABLET: 10; 325 TABLET ORAL at 05:03

## 2024-03-21 NOTE — DISCHARGE INSTRUCTIONS
Continue home medications as directed.  Follow-up with your primary provider for further evaluation and management of your pain.  Return to the emergency department for worsening condition.
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
## 2024-03-22 ENCOUNTER — HOSPITAL ENCOUNTER (EMERGENCY)
Facility: HOSPITAL | Age: 59
Discharge: HOME | End: 2024-03-22
Attending: EMERGENCY MEDICINE
Payer: MEDICAID

## 2024-03-22 VITALS
OXYGEN SATURATION: 95 % | DIASTOLIC BLOOD PRESSURE: 75 MMHG | SYSTOLIC BLOOD PRESSURE: 126 MMHG | HEART RATE: 72 BPM | TEMPERATURE: 98 F | HEIGHT: 71 IN | WEIGHT: 187 LBS | RESPIRATION RATE: 16 BRPM | BODY MASS INDEX: 26.18 KG/M2

## 2024-03-22 DIAGNOSIS — M62.838 NECK MUSCLE SPASM: Primary | ICD-10-CM

## 2024-03-22 PROCEDURE — 99284 EMERGENCY DEPT VISIT MOD MDM: CPT | Mod: 25

## 2024-03-22 PROCEDURE — 63600175 PHARM REV CODE 636 W HCPCS: Performed by: EMERGENCY MEDICINE

## 2024-03-22 PROCEDURE — 96372 THER/PROPH/DIAG INJ SC/IM: CPT | Performed by: EMERGENCY MEDICINE

## 2024-03-22 RX ORDER — TIZANIDINE 4 MG/1
4 TABLET ORAL EVERY 8 HOURS PRN
Qty: 30 TABLET | Refills: 0 | OUTPATIENT
Start: 2024-03-22 | End: 2024-03-24

## 2024-03-22 RX ORDER — HYDROMORPHONE HYDROCHLORIDE 1 MG/ML
1 INJECTION, SOLUTION INTRAMUSCULAR; INTRAVENOUS; SUBCUTANEOUS
Status: COMPLETED | OUTPATIENT
Start: 2024-03-22 | End: 2024-03-22

## 2024-03-22 RX ORDER — METHYLPREDNISOLONE ACETATE 80 MG/ML
120 INJECTION, SUSPENSION INTRA-ARTICULAR; INTRALESIONAL; INTRAMUSCULAR; SOFT TISSUE
Status: COMPLETED | OUTPATIENT
Start: 2024-03-22 | End: 2024-03-22

## 2024-03-22 RX ADMIN — METHYLPREDNISOLONE ACETATE 120 MG: 80 INJECTION, SUSPENSION INTRA-ARTICULAR; INTRALESIONAL; INTRAMUSCULAR; SOFT TISSUE at 05:03

## 2024-03-22 RX ADMIN — HYDROMORPHONE HYDROCHLORIDE 1 MG: 1 INJECTION, SOLUTION INTRAMUSCULAR; INTRAVENOUS; SUBCUTANEOUS at 05:03
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
## 2024-03-24 ENCOUNTER — HOSPITAL ENCOUNTER (EMERGENCY)
Facility: HOSPITAL | Age: 59
Discharge: HOME | End: 2024-03-24
Attending: EMERGENCY MEDICINE
Payer: MEDICAID

## 2024-03-24 VITALS
DIASTOLIC BLOOD PRESSURE: 68 MMHG | SYSTOLIC BLOOD PRESSURE: 105 MMHG | TEMPERATURE: 99 F | BODY MASS INDEX: 26.18 KG/M2 | WEIGHT: 187 LBS | HEIGHT: 71 IN | OXYGEN SATURATION: 96 % | RESPIRATION RATE: 18 BRPM | HEART RATE: 76 BPM

## 2024-03-24 DIAGNOSIS — M62.838 MUSCLE SPASM: Primary | ICD-10-CM

## 2024-03-24 DIAGNOSIS — G89.4 CHRONIC PAIN SYNDROME: ICD-10-CM

## 2024-03-24 PROCEDURE — 96372 THER/PROPH/DIAG INJ SC/IM: CPT | Performed by: EMERGENCY MEDICINE

## 2024-03-24 PROCEDURE — 63600175 PHARM REV CODE 636 W HCPCS: Performed by: EMERGENCY MEDICINE

## 2024-03-24 PROCEDURE — 99284 EMERGENCY DEPT VISIT MOD MDM: CPT | Mod: 25

## 2024-03-24 RX ORDER — DEXAMETHASONE SODIUM PHOSPHATE 4 MG/ML
8 INJECTION, SOLUTION INTRA-ARTICULAR; INTRALESIONAL; INTRAMUSCULAR; INTRAVENOUS; SOFT TISSUE
Status: COMPLETED | OUTPATIENT
Start: 2024-03-24 | End: 2024-03-24

## 2024-03-24 RX ORDER — TIZANIDINE 4 MG/1
4 TABLET ORAL EVERY 8 HOURS PRN
Qty: 30 TABLET | Refills: 0 | Status: SHIPPED | OUTPATIENT
Start: 2024-03-24 | End: 2024-04-03

## 2024-03-24 RX ORDER — ORPHENADRINE CITRATE 30 MG/ML
60 INJECTION INTRAMUSCULAR; INTRAVENOUS
Status: COMPLETED | OUTPATIENT
Start: 2024-03-24 | End: 2024-03-24

## 2024-03-24 RX ADMIN — ORPHENADRINE CITRATE 60 MG: 60 INJECTION INTRAMUSCULAR; INTRAVENOUS at 04:03

## 2024-03-24 RX ADMIN — DEXAMETHASONE SODIUM PHOSPHATE 8 MG: 4 INJECTION, SOLUTION INTRA-ARTICULAR; INTRALESIONAL; INTRAMUSCULAR; INTRAVENOUS; SOFT TISSUE at 04:03
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
## 2024-05-03 ENCOUNTER — HOSPITAL ENCOUNTER (EMERGENCY)
Facility: HOSPITAL | Age: 59
Discharge: HOME | End: 2024-05-03
Attending: EMERGENCY MEDICINE
Payer: MEDICAID

## 2024-05-03 VITALS
HEIGHT: 71 IN | OXYGEN SATURATION: 98 % | TEMPERATURE: 99 F | RESPIRATION RATE: 18 BRPM | HEART RATE: 76 BPM | WEIGHT: 182.38 LBS | DIASTOLIC BLOOD PRESSURE: 85 MMHG | BODY MASS INDEX: 25.53 KG/M2 | SYSTOLIC BLOOD PRESSURE: 132 MMHG

## 2024-05-03 DIAGNOSIS — M54.50 CHRONIC BILATERAL LOW BACK PAIN WITHOUT SCIATICA: Primary | ICD-10-CM

## 2024-05-03 DIAGNOSIS — M25.512 CHRONIC LEFT SHOULDER PAIN: ICD-10-CM

## 2024-05-03 DIAGNOSIS — G89.29 CHRONIC BILATERAL LOW BACK PAIN WITHOUT SCIATICA: Primary | ICD-10-CM

## 2024-05-03 DIAGNOSIS — G89.29 CHRONIC LEFT SHOULDER PAIN: ICD-10-CM

## 2024-05-03 PROCEDURE — 96372 THER/PROPH/DIAG INJ SC/IM: CPT | Performed by: EMERGENCY MEDICINE

## 2024-05-03 PROCEDURE — 63600175 PHARM REV CODE 636 W HCPCS: Performed by: EMERGENCY MEDICINE

## 2024-05-03 PROCEDURE — 99284 EMERGENCY DEPT VISIT MOD MDM: CPT | Mod: 25

## 2024-05-03 RX ORDER — KETOROLAC TROMETHAMINE 30 MG/ML
60 INJECTION, SOLUTION INTRAMUSCULAR; INTRAVENOUS
Status: COMPLETED | OUTPATIENT
Start: 2024-05-03 | End: 2024-05-03

## 2024-05-03 RX ORDER — TIZANIDINE 4 MG/1
4 TABLET ORAL EVERY 8 HOURS PRN
Qty: 30 TABLET | Refills: 0 | Status: SHIPPED | OUTPATIENT
Start: 2024-05-03 | End: 2024-05-13

## 2024-05-03 RX ADMIN — KETOROLAC TROMETHAMINE 60 MG: 30 INJECTION, SOLUTION INTRAMUSCULAR at 08:05
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
## 2024-05-26 ENCOUNTER — HOSPITAL ENCOUNTER (EMERGENCY)
Facility: HOSPITAL | Age: 59
Discharge: HOME | End: 2024-05-26
Attending: EMERGENCY MEDICINE
Payer: MEDICAID

## 2024-05-26 VITALS
RESPIRATION RATE: 18 BRPM | WEIGHT: 180 LBS | OXYGEN SATURATION: 98 % | HEART RATE: 77 BPM | DIASTOLIC BLOOD PRESSURE: 92 MMHG | BODY MASS INDEX: 25.2 KG/M2 | HEIGHT: 71 IN | TEMPERATURE: 99 F | SYSTOLIC BLOOD PRESSURE: 137 MMHG

## 2024-05-26 DIAGNOSIS — M54.9 CHRONIC BILATERAL BACK PAIN, UNSPECIFIED BACK LOCATION: Primary | ICD-10-CM

## 2024-05-26 DIAGNOSIS — G89.29 CHRONIC BILATERAL BACK PAIN, UNSPECIFIED BACK LOCATION: Primary | ICD-10-CM

## 2024-05-26 PROCEDURE — 96372 THER/PROPH/DIAG INJ SC/IM: CPT | Performed by: CLINICAL NURSE SPECIALIST

## 2024-05-26 PROCEDURE — 63600175 PHARM REV CODE 636 W HCPCS: Performed by: CLINICAL NURSE SPECIALIST

## 2024-05-26 PROCEDURE — 99284 EMERGENCY DEPT VISIT MOD MDM: CPT | Mod: 25

## 2024-05-26 RX ORDER — DEXAMETHASONE SODIUM PHOSPHATE 4 MG/ML
8 INJECTION, SOLUTION INTRA-ARTICULAR; INTRALESIONAL; INTRAMUSCULAR; INTRAVENOUS; SOFT TISSUE
Status: COMPLETED | OUTPATIENT
Start: 2024-05-26 | End: 2024-05-26

## 2024-05-26 RX ORDER — TIZANIDINE 4 MG/1
4 TABLET ORAL EVERY 6 HOURS PRN
Qty: 15 TABLET | Refills: 0 | Status: SHIPPED | OUTPATIENT
Start: 2024-05-26 | End: 2024-05-26

## 2024-05-26 RX ORDER — KETOROLAC TROMETHAMINE 30 MG/ML
60 INJECTION, SOLUTION INTRAMUSCULAR; INTRAVENOUS ONCE
Status: COMPLETED | OUTPATIENT
Start: 2024-05-26 | End: 2024-05-26

## 2024-05-26 RX ORDER — TIZANIDINE 4 MG/1
4 TABLET ORAL EVERY 6 HOURS PRN
Qty: 15 TABLET | Refills: 0 | Status: SHIPPED | OUTPATIENT
Start: 2024-05-26 | End: 2024-06-05

## 2024-05-26 RX ORDER — KETOROLAC TROMETHAMINE 30 MG/ML
60 INJECTION, SOLUTION INTRAMUSCULAR; INTRAVENOUS ONCE
Status: DISCONTINUED | OUTPATIENT
Start: 2024-05-26 | End: 2024-05-26

## 2024-05-26 RX ADMIN — DEXAMETHASONE SODIUM PHOSPHATE 8 MG: 4 INJECTION INTRA-ARTICULAR; INTRALESIONAL; INTRAMUSCULAR; INTRAVENOUS; SOFT TISSUE at 09:05

## 2024-05-26 RX ADMIN — KETOROLAC TROMETHAMINE 60 MG: 30 INJECTION, SOLUTION INTRAMUSCULAR at 09:05
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
## 2024-07-23 ENCOUNTER — OFFICE VISIT (OUTPATIENT)
Dept: ORTHOPEDICS | Facility: CLINIC | Age: 59
End: 2024-07-23
Payer: MEDICAID

## 2024-07-23 DIAGNOSIS — S83.242D ACUTE MEDIAL MENISCAL TEAR, LEFT, SUBSEQUENT ENCOUNTER: Primary | ICD-10-CM

## 2024-07-23 DIAGNOSIS — M25.462 KNEE EFFUSION, LEFT: ICD-10-CM

## 2024-07-23 DIAGNOSIS — M25.562 ACUTE PAIN OF LEFT KNEE: ICD-10-CM

## 2024-07-23 PROCEDURE — 1159F MED LIST DOCD IN RCRD: CPT | Mod: CPTII,,, | Performed by: NURSE PRACTITIONER

## 2024-07-23 PROCEDURE — 1160F RVW MEDS BY RX/DR IN RCRD: CPT | Mod: CPTII,,, | Performed by: NURSE PRACTITIONER

## 2024-07-23 PROCEDURE — 99213 OFFICE O/P EST LOW 20 MIN: CPT | Mod: S$PBB,,, | Performed by: NURSE PRACTITIONER

## 2024-07-23 PROCEDURE — 99212 OFFICE O/P EST SF 10 MIN: CPT | Mod: PBBFAC | Performed by: NURSE PRACTITIONER

## 2024-07-23 PROCEDURE — 99999 PR PBB SHADOW E&M-EST. PATIENT-LVL II: CPT | Mod: PBBFAC,,, | Performed by: NURSE PRACTITIONER

## 2024-07-23 RX ORDER — ATORVASTATIN CALCIUM 80 MG/1
80 TABLET, FILM COATED ORAL DAILY
COMMUNITY
Start: 2023-09-01

## 2024-07-23 RX ORDER — HYDROCODONE BITARTRATE AND ACETAMINOPHEN 5; 325 MG/1; MG/1
1 TABLET ORAL EVERY 6 HOURS PRN
Qty: 10 TABLET | Refills: 0 | Status: SHIPPED | OUTPATIENT
Start: 2024-07-23

## 2024-07-23 NOTE — PROGRESS NOTES
Subjective:      Follow up: Left knee pain post knee Arthroscopy      Arnulfo Fay is a 58 y.o. male returns for follow up for increased left sided knee pain and swelling post left knee Arthroscopy. DOS was on 05/31/24.  He is 7 weeks post surgery. He had a medial menisectomy done at [facility]. He states that he did not have any therapy post surgery. He states that the knee has been swelling, increased pain and difficulty ambulating at times. He presents and rates his pain as 6/10.  He denies locking but is having persistent swelling and anterior knee pain. Symptoms worsen with activity, getting up from a chair, weight bearing, sitting for prolonged periods of time.  He is noted with a mild limp.       Review of Systems   Constitutional: Negative.  Negative for fever.   Musculoskeletal:  Positive for joint pain.   Skin: Negative.    Neurological: Negative.    Psychiatric/Behavioral: Negative.     All other systems reviewed and are negative.      Objective:      NVI  General :   alert, appears stated age, and cooperative   Gait: Antalgic- mild   Left Lower Extremity  Hip Palpation:  no tenderness over the greater trochanters   Hip ROM: 100% of normal    Knee Effusion:  0-1 + knee   Ecchymosis:  none   Knee ROM:  5 to 110 degrees with subpatellar crepitance   Patella:  Patella does track normally.  Patellar apprehension test: negative  Patellar compression test: negative   Tenderness: medial joint line, patella   Stability:  Lachman's test: negative  Posterior drawer: negative  Medial collateral ligament: negative  Lateral collateral ligament: negative     Dominique Test:  negative   Melody's Test:  guarded   Sensation:   intact to light touch   Pulses: normal DP and PT pulses.    Mild quad weakness noted.   Brisk cap refill.      Imaging  None today     Assessment:      Left knee pain, mild DJD and post recent knee Arthroscopy    Plan:      He is 7 weeks post knee surgery, I informed him that he needs to follow up  with [facility] to discuss any issues with the surgery since it was done there.   Referral made to PT for ROM and strengthening of the left knee.  ACE applied, ice and Continue home physician directed exercises.   Limit Stairs and squatting.   Rx- Hydrocodone 5/325mg prn pain as directed. Reviewed dosing and side effects.   Follow up: as directed.  He had no further questions.

## 2024-07-30 ENCOUNTER — HOSPITAL ENCOUNTER (EMERGENCY)
Facility: HOSPITAL | Age: 59
Discharge: HOME | End: 2024-07-30
Attending: EMERGENCY MEDICINE
Payer: MEDICAID

## 2024-07-30 VITALS
BODY MASS INDEX: 25.48 KG/M2 | HEIGHT: 71 IN | SYSTOLIC BLOOD PRESSURE: 136 MMHG | RESPIRATION RATE: 18 BRPM | WEIGHT: 182 LBS | OXYGEN SATURATION: 97 % | HEART RATE: 81 BPM | DIASTOLIC BLOOD PRESSURE: 73 MMHG | TEMPERATURE: 98 F

## 2024-07-30 DIAGNOSIS — R11.2 NAUSEA, VOMITING AND DIARRHEA: Primary | ICD-10-CM

## 2024-07-30 DIAGNOSIS — R19.7 NAUSEA, VOMITING AND DIARRHEA: Primary | ICD-10-CM

## 2024-07-30 PROCEDURE — 25000003 PHARM REV CODE 250: Performed by: EMERGENCY MEDICINE

## 2024-07-30 PROCEDURE — 87502 INFLUENZA DNA AMP PROBE: CPT

## 2024-07-30 PROCEDURE — 99283 EMERGENCY DEPT VISIT LOW MDM: CPT

## 2024-07-30 PROCEDURE — 87635 SARS-COV-2 COVID-19 AMP PRB: CPT | Performed by: EMERGENCY MEDICINE

## 2024-07-30 RX ORDER — ONDANSETRON 4 MG/1
4 TABLET, ORALLY DISINTEGRATING ORAL
Status: COMPLETED | OUTPATIENT
Start: 2024-07-30 | End: 2024-07-30

## 2024-07-30 RX ORDER — ONDANSETRON 4 MG/1
4 TABLET, ORALLY DISINTEGRATING ORAL EVERY 6 HOURS PRN
Qty: 20 TABLET | Refills: 0 | Status: SHIPPED | OUTPATIENT
Start: 2024-07-30

## 2024-07-30 RX ADMIN — ONDANSETRON 4 MG: 4 TABLET, ORALLY DISINTEGRATING ORAL at 03:07
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
## 2024-07-31 PROBLEM — E78.5 HYPERLIPIDEMIA: Status: ACTIVE | Noted: 2021-01-20

## 2024-07-31 PROBLEM — R73.01 IMPAIRED FASTING GLUCOSE: Status: ACTIVE | Noted: 2021-01-20

## 2024-07-31 PROBLEM — F32.A DEPRESSION: Status: ACTIVE | Noted: 2021-01-20

## 2024-08-04 ENCOUNTER — HOSPITAL ENCOUNTER (EMERGENCY)
Facility: HOSPITAL | Age: 59
Discharge: HOME | End: 2024-08-04
Attending: EMERGENCY MEDICINE
Payer: MEDICAID

## 2024-08-04 VITALS
OXYGEN SATURATION: 96 % | WEIGHT: 180 LBS | HEART RATE: 80 BPM | RESPIRATION RATE: 18 BRPM | BODY MASS INDEX: 25.77 KG/M2 | HEIGHT: 70 IN | SYSTOLIC BLOOD PRESSURE: 125 MMHG | DIASTOLIC BLOOD PRESSURE: 74 MMHG | TEMPERATURE: 98 F

## 2024-08-04 DIAGNOSIS — M62.830 SPASM OF BACK MUSCLES: Primary | ICD-10-CM

## 2024-08-04 PROCEDURE — 96372 THER/PROPH/DIAG INJ SC/IM: CPT | Performed by: EMERGENCY MEDICINE

## 2024-08-04 PROCEDURE — 99284 EMERGENCY DEPT VISIT MOD MDM: CPT | Mod: 25

## 2024-08-04 PROCEDURE — 63600175 PHARM REV CODE 636 W HCPCS: Performed by: EMERGENCY MEDICINE

## 2024-08-04 PROCEDURE — 25000003 PHARM REV CODE 250: Performed by: EMERGENCY MEDICINE

## 2024-08-04 RX ORDER — LIDOCAINE 50 MG/G
1 PATCH TOPICAL
Status: DISCONTINUED | OUTPATIENT
Start: 2024-08-04 | End: 2024-08-04 | Stop reason: HOSPADM

## 2024-08-04 RX ORDER — CYCLOBENZAPRINE HCL 10 MG
10 TABLET ORAL
Status: COMPLETED | OUTPATIENT
Start: 2024-08-04 | End: 2024-08-04

## 2024-08-04 RX ORDER — KETOROLAC TROMETHAMINE 30 MG/ML
30 INJECTION, SOLUTION INTRAMUSCULAR; INTRAVENOUS
Status: COMPLETED | OUTPATIENT
Start: 2024-08-04 | End: 2024-08-04

## 2024-08-04 RX ADMIN — CYCLOBENZAPRINE HYDROCHLORIDE 10 MG: 10 TABLET, FILM COATED ORAL at 02:08

## 2024-08-04 RX ADMIN — LIDOCAINE 1 PATCH: 50 PATCH CUTANEOUS at 02:08

## 2024-08-04 RX ADMIN — KETOROLAC TROMETHAMINE 30 MG: 30 INJECTION, SOLUTION INTRAMUSCULAR at 02:08

## 2024-08-05 ENCOUNTER — HOSPITAL ENCOUNTER (EMERGENCY)
Facility: HOSPITAL | Age: 59
Discharge: HOME | End: 2024-08-05
Attending: EMERGENCY MEDICINE
Payer: MEDICAID

## 2024-08-05 VITALS
OXYGEN SATURATION: 95 % | RESPIRATION RATE: 20 BRPM | TEMPERATURE: 99 F | HEIGHT: 70 IN | DIASTOLIC BLOOD PRESSURE: 86 MMHG | WEIGHT: 179.81 LBS | HEART RATE: 88 BPM | SYSTOLIC BLOOD PRESSURE: 139 MMHG | BODY MASS INDEX: 25.74 KG/M2

## 2024-08-05 DIAGNOSIS — G89.29 CHRONIC BILATERAL THORACIC BACK PAIN: Primary | ICD-10-CM

## 2024-08-05 DIAGNOSIS — M54.6 CHRONIC BILATERAL THORACIC BACK PAIN: Primary | ICD-10-CM

## 2024-08-05 PROCEDURE — 63600175 PHARM REV CODE 636 W HCPCS: Performed by: EMERGENCY MEDICINE

## 2024-08-05 PROCEDURE — 25000003 PHARM REV CODE 250: Performed by: EMERGENCY MEDICINE

## 2024-08-05 PROCEDURE — 96372 THER/PROPH/DIAG INJ SC/IM: CPT | Performed by: EMERGENCY MEDICINE

## 2024-08-05 PROCEDURE — 99284 EMERGENCY DEPT VISIT MOD MDM: CPT | Mod: 25

## 2024-08-05 RX ORDER — CYCLOBENZAPRINE HCL 10 MG
10 TABLET ORAL
Status: COMPLETED | OUTPATIENT
Start: 2024-08-05 | End: 2024-08-05

## 2024-08-05 RX ORDER — KETOROLAC TROMETHAMINE 30 MG/ML
15 INJECTION, SOLUTION INTRAMUSCULAR; INTRAVENOUS
Status: COMPLETED | OUTPATIENT
Start: 2024-08-05 | End: 2024-08-05

## 2024-08-05 RX ORDER — LIDOCAINE 50 MG/G
1 PATCH TOPICAL
Status: DISCONTINUED | OUTPATIENT
Start: 2024-08-05 | End: 2024-08-05 | Stop reason: HOSPADM

## 2024-08-05 RX ADMIN — LIDOCAINE 1 PATCH: 50 PATCH CUTANEOUS at 07:08

## 2024-08-05 RX ADMIN — KETOROLAC TROMETHAMINE 15 MG: 30 INJECTION, SOLUTION INTRAMUSCULAR at 07:08

## 2024-08-05 RX ADMIN — CYCLOBENZAPRINE HYDROCHLORIDE 10 MG: 10 TABLET, FILM COATED ORAL at 07:08

## 2024-08-14 ENCOUNTER — HOSPITAL ENCOUNTER (EMERGENCY)
Facility: HOSPITAL | Age: 59
Discharge: HOME | End: 2024-08-14
Attending: EMERGENCY MEDICINE
Payer: MEDICAID

## 2024-08-14 VITALS
HEART RATE: 77 BPM | RESPIRATION RATE: 18 BRPM | TEMPERATURE: 99 F | DIASTOLIC BLOOD PRESSURE: 97 MMHG | OXYGEN SATURATION: 97 % | SYSTOLIC BLOOD PRESSURE: 133 MMHG | BODY MASS INDEX: 25.77 KG/M2 | WEIGHT: 180 LBS | HEIGHT: 70 IN

## 2024-08-14 DIAGNOSIS — M62.838 MUSCLE SPASM: Primary | ICD-10-CM

## 2024-08-14 PROCEDURE — 63600175 PHARM REV CODE 636 W HCPCS: Performed by: EMERGENCY MEDICINE

## 2024-08-14 PROCEDURE — 99284 EMERGENCY DEPT VISIT MOD MDM: CPT | Mod: 25

## 2024-08-14 PROCEDURE — 96372 THER/PROPH/DIAG INJ SC/IM: CPT | Performed by: EMERGENCY MEDICINE

## 2024-08-14 RX ORDER — ORPHENADRINE CITRATE 30 MG/ML
60 INJECTION INTRAMUSCULAR; INTRAVENOUS
Status: COMPLETED | OUTPATIENT
Start: 2024-08-14 | End: 2024-08-14

## 2024-08-14 RX ADMIN — ORPHENADRINE CITRATE 60 MG: 60 INJECTION INTRAMUSCULAR; INTRAVENOUS at 06:08
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
## 2024-08-16 ENCOUNTER — HOSPITAL ENCOUNTER (EMERGENCY)
Facility: HOSPITAL | Age: 59
Discharge: HOME | End: 2024-08-16
Attending: EMERGENCY MEDICINE
Payer: MEDICAID

## 2024-08-16 VITALS
TEMPERATURE: 98 F | SYSTOLIC BLOOD PRESSURE: 153 MMHG | HEART RATE: 93 BPM | RESPIRATION RATE: 18 BRPM | OXYGEN SATURATION: 96 % | DIASTOLIC BLOOD PRESSURE: 95 MMHG

## 2024-08-16 DIAGNOSIS — M62.838 MUSCLE SPASM OF LEFT SHOULDER AREA: Primary | ICD-10-CM

## 2024-08-16 PROCEDURE — 63600175 PHARM REV CODE 636 W HCPCS

## 2024-08-16 PROCEDURE — 99284 EMERGENCY DEPT VISIT MOD MDM: CPT | Mod: 25

## 2024-08-16 PROCEDURE — 96372 THER/PROPH/DIAG INJ SC/IM: CPT

## 2024-08-16 RX ORDER — DICLOFENAC SODIUM 10 MG/G
2 GEL TOPICAL 4 TIMES DAILY
Qty: 50 G | Refills: 0 | Status: SHIPPED | OUTPATIENT
Start: 2024-08-16

## 2024-08-16 RX ORDER — CYCLOBENZAPRINE HCL 10 MG
10 TABLET ORAL 3 TIMES DAILY PRN
Qty: 15 TABLET | Refills: 0 | Status: SHIPPED | OUTPATIENT
Start: 2024-08-16 | End: 2024-08-21

## 2024-08-16 RX ORDER — ORPHENADRINE CITRATE 30 MG/ML
60 INJECTION INTRAMUSCULAR; INTRAVENOUS
Status: COMPLETED | OUTPATIENT
Start: 2024-08-16 | End: 2024-08-16

## 2024-08-16 RX ADMIN — ORPHENADRINE CITRATE 60 MG: 30 INJECTION, SOLUTION INTRAMUSCULAR; INTRAVENOUS at 04:08
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
## 2024-08-16 NOTE — DISCHARGE INSTRUCTIONS
You can use the gel 4 times a day as needed for pain.  Take the Flexeril 3 times a day as needed for muscle spasms.  Follow up with primary care as scheduled.

## 2024-08-19 ENCOUNTER — HOSPITAL ENCOUNTER (OUTPATIENT)
Dept: RADIOLOGY | Facility: HOSPITAL | Age: 59
Discharge: HOME | End: 2024-08-19
Attending: NURSE PRACTITIONER
Payer: MEDICAID

## 2024-08-19 DIAGNOSIS — M25.512 LEFT SHOULDER PAIN: ICD-10-CM

## 2024-08-19 DIAGNOSIS — M25.512 LEFT SHOULDER PAIN: Primary | ICD-10-CM

## 2024-08-19 PROCEDURE — 73030 X-RAY EXAM OF SHOULDER: CPT | Mod: TC,LT

## 2024-09-08 ENCOUNTER — HOSPITAL ENCOUNTER (EMERGENCY)
Facility: HOSPITAL | Age: 59
Discharge: HOME | End: 2024-09-08
Attending: EMERGENCY MEDICINE
Payer: MEDICAID

## 2024-09-08 VITALS
WEIGHT: 180 LBS | BODY MASS INDEX: 25.77 KG/M2 | SYSTOLIC BLOOD PRESSURE: 150 MMHG | RESPIRATION RATE: 18 BRPM | HEIGHT: 70 IN | OXYGEN SATURATION: 97 % | DIASTOLIC BLOOD PRESSURE: 89 MMHG | TEMPERATURE: 99 F | HEART RATE: 87 BPM

## 2024-09-08 DIAGNOSIS — R11.10 VOMITING AND DIARRHEA: Primary | ICD-10-CM

## 2024-09-08 DIAGNOSIS — R19.7 VOMITING AND DIARRHEA: Primary | ICD-10-CM

## 2024-09-08 LAB
ALBUMIN SERPL BCP-MCNC: 3 G/DL (ref 3.5–5.2)
ALP SERPL-CCNC: 143 U/L (ref 55–135)
ALT SERPL W/O P-5'-P-CCNC: 18 U/L (ref 10–44)
ANION GAP SERPL CALC-SCNC: 9 MMOL/L (ref 3–11)
AST SERPL-CCNC: 7 U/L (ref 10–40)
BASOPHILS # BLD AUTO: 0.02 K/UL (ref 0–0.2)
BASOPHILS NFR BLD: 0.2 % (ref 0–1.9)
BILIRUB SERPL-MCNC: 0.3 MG/DL (ref 0.1–1)
BUN SERPL-MCNC: 11 MG/DL (ref 6–20)
CALCIUM SERPL-MCNC: 8.8 MG/DL (ref 8.7–10.5)
CHLORIDE SERPL-SCNC: 96 MMOL/L (ref 95–110)
CO2 SERPL-SCNC: 31 MMOL/L (ref 23–29)
CREAT SERPL-MCNC: 0.6 MG/DL (ref 0.5–1.4)
CTP QC/QA: YES
DIFFERENTIAL METHOD BLD: ABNORMAL
EOSINOPHIL # BLD AUTO: 0.3 K/UL (ref 0–0.5)
EOSINOPHIL NFR BLD: 2.5 % (ref 0–8)
ERYTHROCYTE [DISTWIDTH] IN BLOOD BY AUTOMATED COUNT: 12.9 % (ref 11.5–14.5)
EST. GFR  (NO RACE VARIABLE): >60 ML/MIN/1.73 M^2
GLUCOSE SERPL-MCNC: 108 MG/DL (ref 70–110)
HCT VFR BLD AUTO: 37.2 % (ref 40–54)
HGB BLD-MCNC: 12.6 G/DL (ref 14–18)
IMM GRANULOCYTES # BLD AUTO: 0.04 K/UL (ref 0–0.04)
IMM GRANULOCYTES NFR BLD AUTO: 0.4 % (ref 0–0.5)
LYMPHOCYTES # BLD AUTO: 1.4 K/UL (ref 1–4.8)
LYMPHOCYTES NFR BLD: 13.9 % (ref 18–48)
MCH RBC QN AUTO: 29.6 PG (ref 27–31)
MCHC RBC AUTO-ENTMCNC: 33.9 G/DL (ref 32–36)
MCV RBC AUTO: 87 FL (ref 82–98)
MONOCYTES # BLD AUTO: 1 K/UL (ref 0.3–1)
MONOCYTES NFR BLD: 9.8 % (ref 4–15)
NEUTROPHILS # BLD AUTO: 7.6 K/UL (ref 1.8–7.7)
NEUTROPHILS NFR BLD: 73.2 % (ref 38–73)
NRBC BLD-RTO: 0 /100 WBC
PLATELET # BLD AUTO: 240 K/UL (ref 150–450)
PMV BLD AUTO: 8.7 FL (ref 9.2–12.9)
POTASSIUM SERPL-SCNC: 3.9 MMOL/L (ref 3.5–5.1)
PROT SERPL-MCNC: 7.9 G/DL (ref 6–8.4)
RBC # BLD AUTO: 4.26 M/UL (ref 4.6–6.2)
SARS-COV-2 RDRP RESP QL NAA+PROBE: NEGATIVE
SODIUM SERPL-SCNC: 136 MMOL/L (ref 136–145)
WBC # BLD AUTO: 10.31 K/UL (ref 3.9–12.7)

## 2024-09-08 PROCEDURE — 80053 COMPREHEN METABOLIC PANEL: CPT | Performed by: NURSE PRACTITIONER

## 2024-09-08 PROCEDURE — 87635 SARS-COV-2 COVID-19 AMP PRB: CPT | Performed by: NURSE PRACTITIONER

## 2024-09-08 PROCEDURE — 96361 HYDRATE IV INFUSION ADD-ON: CPT

## 2024-09-08 PROCEDURE — 36415 COLL VENOUS BLD VENIPUNCTURE: CPT | Performed by: NURSE PRACTITIONER

## 2024-09-08 PROCEDURE — 96374 THER/PROPH/DIAG INJ IV PUSH: CPT

## 2024-09-08 PROCEDURE — 99284 EMERGENCY DEPT VISIT MOD MDM: CPT | Mod: 25

## 2024-09-08 PROCEDURE — 85025 COMPLETE CBC W/AUTO DIFF WBC: CPT | Performed by: NURSE PRACTITIONER

## 2024-09-08 PROCEDURE — 96372 THER/PROPH/DIAG INJ SC/IM: CPT | Performed by: NURSE PRACTITIONER

## 2024-09-08 PROCEDURE — 63600175 PHARM REV CODE 636 W HCPCS: Performed by: NURSE PRACTITIONER

## 2024-09-08 PROCEDURE — 25000003 PHARM REV CODE 250: Performed by: NURSE PRACTITIONER

## 2024-09-08 RX ORDER — DICYCLOMINE HYDROCHLORIDE 20 MG/1
20 TABLET ORAL 2 TIMES DAILY
Qty: 6 TABLET | Refills: 0 | Status: SHIPPED | OUTPATIENT
Start: 2024-09-08 | End: 2024-09-11

## 2024-09-08 RX ORDER — ONDANSETRON 4 MG/1
4 TABLET, ORALLY DISINTEGRATING ORAL EVERY 8 HOURS PRN
Qty: 6 TABLET | Refills: 0 | Status: SHIPPED | OUTPATIENT
Start: 2024-09-08 | End: 2024-09-10

## 2024-09-08 RX ORDER — DICYCLOMINE HYDROCHLORIDE 10 MG/ML
20 INJECTION INTRAMUSCULAR
Status: COMPLETED | OUTPATIENT
Start: 2024-09-08 | End: 2024-09-08

## 2024-09-08 RX ORDER — ONDANSETRON HYDROCHLORIDE 2 MG/ML
8 INJECTION, SOLUTION INTRAVENOUS
Status: COMPLETED | OUTPATIENT
Start: 2024-09-08 | End: 2024-09-08

## 2024-09-08 RX ADMIN — SODIUM CHLORIDE 1000 ML: 9 INJECTION, SOLUTION INTRAVENOUS at 04:09

## 2024-09-08 RX ADMIN — DICYCLOMINE HYDROCHLORIDE 20 MG: 10 INJECTION, SOLUTION INTRAMUSCULAR at 04:09

## 2024-09-08 RX ADMIN — ONDANSETRON 8 MG: 2 INJECTION INTRAMUSCULAR; INTRAVENOUS at 04:09
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
## 2024-09-13 ENCOUNTER — HOSPITAL ENCOUNTER (EMERGENCY)
Facility: HOSPITAL | Age: 59
Discharge: HOME | End: 2024-09-13
Attending: STUDENT IN AN ORGANIZED HEALTH CARE EDUCATION/TRAINING PROGRAM
Payer: MEDICAID

## 2024-09-13 VITALS
BODY MASS INDEX: 24.62 KG/M2 | DIASTOLIC BLOOD PRESSURE: 86 MMHG | SYSTOLIC BLOOD PRESSURE: 110 MMHG | HEART RATE: 84 BPM | RESPIRATION RATE: 16 BRPM | HEIGHT: 70 IN | TEMPERATURE: 99 F | WEIGHT: 172 LBS | OXYGEN SATURATION: 99 %

## 2024-09-13 DIAGNOSIS — M10.9 GOUT OF RIGHT KNEE, UNSPECIFIED CAUSE, UNSPECIFIED CHRONICITY: Primary | ICD-10-CM

## 2024-09-13 PROCEDURE — 96372 THER/PROPH/DIAG INJ SC/IM: CPT | Performed by: STUDENT IN AN ORGANIZED HEALTH CARE EDUCATION/TRAINING PROGRAM

## 2024-09-13 PROCEDURE — 99284 EMERGENCY DEPT VISIT MOD MDM: CPT | Mod: 25

## 2024-09-13 PROCEDURE — 63600175 PHARM REV CODE 636 W HCPCS: Performed by: STUDENT IN AN ORGANIZED HEALTH CARE EDUCATION/TRAINING PROGRAM

## 2024-09-13 PROCEDURE — 25000003 PHARM REV CODE 250: Performed by: STUDENT IN AN ORGANIZED HEALTH CARE EDUCATION/TRAINING PROGRAM

## 2024-09-13 RX ORDER — DEXAMETHASONE SODIUM PHOSPHATE 4 MG/ML
12 INJECTION, SOLUTION INTRA-ARTICULAR; INTRALESIONAL; INTRAMUSCULAR; INTRAVENOUS; SOFT TISSUE
Status: COMPLETED | OUTPATIENT
Start: 2024-09-13 | End: 2024-09-13

## 2024-09-13 RX ORDER — IBUPROFEN 400 MG/1
800 TABLET ORAL
Status: COMPLETED | OUTPATIENT
Start: 2024-09-13 | End: 2024-09-13

## 2024-09-13 RX ORDER — INDOMETHACIN 50 MG/1
50 CAPSULE ORAL
Qty: 15 CAPSULE | Refills: 0 | Status: SHIPPED | OUTPATIENT
Start: 2024-09-13 | End: 2024-09-18

## 2024-09-13 RX ADMIN — DEXAMETHASONE SODIUM PHOSPHATE 12 MG: 4 INJECTION INTRA-ARTICULAR; INTRALESIONAL; INTRAMUSCULAR; INTRAVENOUS; SOFT TISSUE at 11:09

## 2024-09-13 RX ADMIN — IBUPROFEN 800 MG: 400 TABLET, FILM COATED ORAL at 11:09

## 2024-09-14 NOTE — DISCHARGE INSTRUCTIONS
Take Indomethacin as prescribed for sx relief.  F/u with PMD for re-evaluation in the outpatient setting
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
## 2024-09-26 ENCOUNTER — HOSPITAL ENCOUNTER (OUTPATIENT)
Dept: RADIOLOGY | Facility: HOSPITAL | Age: 59
Discharge: HOME | End: 2024-09-26
Payer: MEDICAID

## 2024-09-26 DIAGNOSIS — M25.461 EFFUSION OF RIGHT KNEE: ICD-10-CM

## 2024-09-26 DIAGNOSIS — M25.461 EFFUSION OF RIGHT KNEE: Primary | ICD-10-CM

## 2024-09-26 DIAGNOSIS — S80.01XA CONTUSION OF RIGHT KNEE: ICD-10-CM

## 2024-09-26 PROCEDURE — 73560 X-RAY EXAM OF KNEE 1 OR 2: CPT | Mod: TC,RT

## 2024-09-27 ENCOUNTER — HOSPITAL ENCOUNTER (EMERGENCY)
Facility: HOSPITAL | Age: 59
Discharge: HOME | End: 2024-09-27
Attending: EMERGENCY MEDICINE
Payer: MEDICAID

## 2024-09-27 VITALS
OXYGEN SATURATION: 96 % | HEART RATE: 102 BPM | HEIGHT: 70 IN | WEIGHT: 172 LBS | DIASTOLIC BLOOD PRESSURE: 84 MMHG | TEMPERATURE: 98 F | BODY MASS INDEX: 24.62 KG/M2 | SYSTOLIC BLOOD PRESSURE: 158 MMHG | RESPIRATION RATE: 16 BRPM

## 2024-09-27 DIAGNOSIS — M25.561 CHRONIC PAIN OF RIGHT KNEE: Primary | ICD-10-CM

## 2024-09-27 DIAGNOSIS — G89.29 CHRONIC PAIN OF RIGHT KNEE: Primary | ICD-10-CM

## 2024-09-27 DIAGNOSIS — M25.461 EFFUSION OF RIGHT KNEE: ICD-10-CM

## 2024-09-27 PROCEDURE — 99284 EMERGENCY DEPT VISIT MOD MDM: CPT | Mod: 25

## 2024-09-27 PROCEDURE — 96372 THER/PROPH/DIAG INJ SC/IM: CPT | Performed by: CLINICAL NURSE SPECIALIST

## 2024-09-27 PROCEDURE — 63600175 PHARM REV CODE 636 W HCPCS: Performed by: CLINICAL NURSE SPECIALIST

## 2024-09-27 RX ORDER — CYCLOBENZAPRINE HCL 10 MG
10 TABLET ORAL 3 TIMES DAILY PRN
Qty: 15 TABLET | Refills: 0 | Status: SHIPPED | OUTPATIENT
Start: 2024-09-27 | End: 2024-10-02

## 2024-09-27 RX ORDER — DEXAMETHASONE SODIUM PHOSPHATE 4 MG/ML
8 INJECTION, SOLUTION INTRA-ARTICULAR; INTRALESIONAL; INTRAMUSCULAR; INTRAVENOUS; SOFT TISSUE
Status: COMPLETED | OUTPATIENT
Start: 2024-09-27 | End: 2024-09-27

## 2024-09-27 RX ORDER — KETOROLAC TROMETHAMINE 30 MG/ML
60 INJECTION, SOLUTION INTRAMUSCULAR; INTRAVENOUS ONCE
Status: COMPLETED | OUTPATIENT
Start: 2024-09-27 | End: 2024-09-27

## 2024-09-27 RX ORDER — KETOROLAC TROMETHAMINE 10 MG/1
10 TABLET, FILM COATED ORAL EVERY 6 HOURS
Qty: 20 TABLET | Refills: 0 | Status: SHIPPED | OUTPATIENT
Start: 2024-09-27 | End: 2024-10-02

## 2024-09-27 RX ADMIN — DEXAMETHASONE SODIUM PHOSPHATE 8 MG: 4 INJECTION INTRA-ARTICULAR; INTRALESIONAL; INTRAMUSCULAR; INTRAVENOUS; SOFT TISSUE at 01:09

## 2024-09-27 RX ADMIN — KETOROLAC TROMETHAMINE 60 MG: 30 INJECTION, SOLUTION INTRAMUSCULAR at 01:09
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
## 2024-10-16 ENCOUNTER — OFFICE VISIT (OUTPATIENT)
Dept: ORTHOPEDICS | Facility: CLINIC | Age: 59
End: 2024-10-16
Payer: MEDICAID

## 2024-10-16 DIAGNOSIS — M17.11 PRIMARY OSTEOARTHRITIS OF RIGHT KNEE: Primary | ICD-10-CM

## 2024-10-16 DIAGNOSIS — G89.29 CHRONIC PAIN OF RIGHT KNEE: ICD-10-CM

## 2024-10-16 DIAGNOSIS — M25.561 CHRONIC PAIN OF RIGHT KNEE: ICD-10-CM

## 2024-10-16 PROCEDURE — 99213 OFFICE O/P EST LOW 20 MIN: CPT | Mod: PBBFAC,25 | Performed by: NURSE PRACTITIONER

## 2024-10-16 PROCEDURE — 20610 DRAIN/INJ JOINT/BURSA W/O US: CPT | Mod: PBBFAC,RT | Performed by: NURSE PRACTITIONER

## 2024-10-16 PROCEDURE — 99213 OFFICE O/P EST LOW 20 MIN: CPT | Mod: S$PBB,25,, | Performed by: NURSE PRACTITIONER

## 2024-10-16 PROCEDURE — 99999 PR PBB SHADOW E&M-EST. PATIENT-LVL III: CPT | Mod: PBBFAC,,, | Performed by: NURSE PRACTITIONER

## 2024-10-16 PROCEDURE — 99999PBSHW PR PBB SHADOW TECHNICAL ONLY FILED TO HB: Mod: PBBFAC,,,

## 2024-10-16 PROCEDURE — 1159F MED LIST DOCD IN RCRD: CPT | Mod: CPTII,,, | Performed by: NURSE PRACTITIONER

## 2024-10-16 PROCEDURE — 1160F RVW MEDS BY RX/DR IN RCRD: CPT | Mod: CPTII,,, | Performed by: NURSE PRACTITIONER

## 2024-10-16 PROCEDURE — 20610 DRAIN/INJ JOINT/BURSA W/O US: CPT | Mod: S$PBB,RT,, | Performed by: NURSE PRACTITIONER

## 2024-10-16 RX ORDER — TRIAMCINOLONE ACETONIDE 40 MG/ML
40 INJECTION, SUSPENSION INTRA-ARTICULAR; INTRAMUSCULAR
Status: DISCONTINUED | OUTPATIENT
Start: 2024-10-16 | End: 2024-10-16 | Stop reason: HOSPADM

## 2024-10-16 RX ADMIN — TRIAMCINOLONE ACETONIDE 40 MG: 40 INJECTION, SUSPENSION INTRA-ARTICULAR; INTRAMUSCULAR at 10:10

## 2024-10-16 NOTE — PROGRESS NOTES
Subjective:      Follow up: Bilateral knee pain     Arnulfo Fay is a 59 y.o. male returns for recurrent RT knee pain. He states that he had a fall 2 weeks ago and impacted the anterior RT knee. He states that he has been having increased pain in the knee before the fall as well. He states that he went to the ER 3 weeks ago for suspected gout affecting the RT knee. Radiographs were done and showed mild DJD. He states that the knee has been swollen and painful since the fall. He presents and rates his pain as 5/10 in the right knee. Symptoms worsen with activity, getting up from a chair, weight bearing, sitting for prolonged periods of time.  He is noted with a mild limp.      Review of Systems   Constitutional: Negative.  Negative for fever.   Musculoskeletal:  Positive for joint pain.   Skin: Negative.    Neurological: Negative.    Psychiatric/Behavioral: Negative.     All other systems reviewed and are negative.      Objective:      NVI  General :   alert, appears stated age, and cooperative   Gait: Antalgic   RT Lower Extremity  Hip Palpation:  no tenderness over the greater trochanters   Hip ROM: 100% of normal    Knee Effusion:  Trace RT knees   Ecchymosis:  none   Knee ROM:  5 to 110 degrees with subpatellar crepitance.   Patella:  Patella does track normally.  Patellar apprehension test: negative  Patellar compression test: negative   Tenderness: medial joint line, patella   Stability:  Lachman's test: negative  Posterior drawer: negative  Medial collateral ligament: negative  Lateral collateral ligament: negative     Dominique Test:  negative   Melody's Test:  guarded   Sensation:   intact to light touch   Pulses: normal DP and PT pulses.    Mild quad weakness noted.   Brisk cap refill.      Imaging RT knee films from 09/26/24  Mild degenerative changes with no acute fracture with chondrocalcinosis      Assessment:      RT knee pain, DJD, chondrocalcinosis     Plan:      Right knee intra-articular steroid  injection done, see procedure note.  Continue home physician directed exercises as previous.   Ice and Nsaid as needed. Limit Stairs and squatting.   Continue knee brace for joint support and compression.   Follow up: prn as directed.  He had no further questions.      PROCEDURE: Right knee intra-articular steroid injection:  I have explained the risks, benefits, and alternatives of the procedure in detail.  The patient voices understanding and all questions have been answered.  The patient agrees to proceed as planned.      So after I performed a sterile prep of the skin in the normal fashion, the right knee is injected using a 22 gauge needle from the lateral approach with a combination of 3cc 1% plain lidocaine and 40 mg Kenalog .       The patient is cautioned and immediate relief of pain is secondary to the local anesthetic and will be temporary.  After the anesthetic wears off there may be a increase in pain that may last for a few hours or a few days and they should use ice to help alleviate this flair up of pain.

## 2024-10-16 NOTE — PROCEDURES
Large Joint Aspiration/Injection: R knee    Date/Time: 10/16/2024 10:15 AM    Performed by: Mathew Valdes NP  Authorized by: Mathew Valdes NP    Consent Done?:  Yes (Written)  Indications:  Arthritis  Timeout: prior to procedure the correct patient, procedure, and site was verified    Prep: patient was prepped and draped in usual sterile fashion      Local anesthesia used?: Yes    Local anesthetic:  Lidocaine 1% without epinephrine  Anesthetic total (ml):  3      Details:  Needle Size:  22 G  Ultrasonic Guidance for needle placement?: No    Approach:  Lateral  Location:  Knee  Site:  R knee  Medications:  40 mg triamcinolone acetonide 40 mg/mL  Aspirate amount (mL):  3  Aspirate:  Clear  Patient tolerance:  Patient tolerated the procedure well with no immediate complications     Risks reviewed: pain, swelling, infection and nerve or tendon injury.     LILY Ahn MA in room

## 2024-12-30 ENCOUNTER — HOSPITAL ENCOUNTER (EMERGENCY)
Facility: HOSPITAL | Age: 59
Discharge: HOME | End: 2024-12-30
Attending: FAMILY MEDICINE
Payer: MEDICAID

## 2024-12-30 VITALS
WEIGHT: 188 LBS | TEMPERATURE: 99 F | OXYGEN SATURATION: 98 % | HEIGHT: 70 IN | RESPIRATION RATE: 18 BRPM | SYSTOLIC BLOOD PRESSURE: 160 MMHG | BODY MASS INDEX: 26.92 KG/M2 | DIASTOLIC BLOOD PRESSURE: 97 MMHG | HEART RATE: 73 BPM

## 2024-12-30 DIAGNOSIS — S09.90XA CLOSED HEAD INJURY, INITIAL ENCOUNTER: Primary | ICD-10-CM

## 2024-12-30 PROCEDURE — 99282 EMERGENCY DEPT VISIT SF MDM: CPT

## 2025-01-09 ENCOUNTER — OFFICE VISIT (OUTPATIENT)
Dept: SURGERY | Facility: CLINIC | Age: 60
End: 2025-01-09
Payer: MEDICAID

## 2025-01-09 VITALS
OXYGEN SATURATION: 98 % | BODY MASS INDEX: 28.2 KG/M2 | HEART RATE: 57 BPM | SYSTOLIC BLOOD PRESSURE: 120 MMHG | WEIGHT: 197 LBS | RESPIRATION RATE: 18 BRPM | HEIGHT: 70 IN | DIASTOLIC BLOOD PRESSURE: 77 MMHG

## 2025-01-09 DIAGNOSIS — L98.8: Primary | ICD-10-CM

## 2025-01-09 PROCEDURE — 3074F SYST BP LT 130 MM HG: CPT | Mod: CPTII,,, | Performed by: STUDENT IN AN ORGANIZED HEALTH CARE EDUCATION/TRAINING PROGRAM

## 2025-01-09 PROCEDURE — 99213 OFFICE O/P EST LOW 20 MIN: CPT | Mod: S$PBB,,, | Performed by: STUDENT IN AN ORGANIZED HEALTH CARE EDUCATION/TRAINING PROGRAM

## 2025-01-09 PROCEDURE — 99999 PR PBB SHADOW E&M-EST. PATIENT-LVL III: CPT | Mod: PBBFAC,,, | Performed by: STUDENT IN AN ORGANIZED HEALTH CARE EDUCATION/TRAINING PROGRAM

## 2025-01-09 PROCEDURE — 3078F DIAST BP <80 MM HG: CPT | Mod: CPTII,,, | Performed by: STUDENT IN AN ORGANIZED HEALTH CARE EDUCATION/TRAINING PROGRAM

## 2025-01-09 PROCEDURE — 1159F MED LIST DOCD IN RCRD: CPT | Mod: CPTII,,, | Performed by: STUDENT IN AN ORGANIZED HEALTH CARE EDUCATION/TRAINING PROGRAM

## 2025-01-09 PROCEDURE — 99213 OFFICE O/P EST LOW 20 MIN: CPT | Mod: PBBFAC | Performed by: STUDENT IN AN ORGANIZED HEALTH CARE EDUCATION/TRAINING PROGRAM

## 2025-01-09 PROCEDURE — 3008F BODY MASS INDEX DOCD: CPT | Mod: CPTII,,, | Performed by: STUDENT IN AN ORGANIZED HEALTH CARE EDUCATION/TRAINING PROGRAM

## 2025-01-09 NOTE — PROGRESS NOTES
"[facility]  General Surgery Clinic Progress Note    HPI:  Arnulfo Fay is a 59 y.o. male with history subcutaneous cyst of the back who presents for follow up.  Reports new painful subcutaneous masses on the right flank and under left breast.      ROS:  Negative except above    PE:  Vitals:    01/09/25 1457   BP: 120/77   BP Location: Left arm   Patient Position: Sitting   Pulse: (!) 57   Resp: 18   SpO2: 98%   Weight: 89.4 kg (197 lb)   Height: 5' 10" (1.778 m)        Wt Readings from Last 2 Encounters:   01/09/25 89.4 kg (197 lb)   12/30/24 85.3 kg (188 lb)           Gen: Alert and oriented x3, judgement intact, NAD  CV: RRR  Resp: CTAB; breaths non-labored and symmetrical  Abd: Soft, NT, ND, BS+  Extremities: No c/c/e  Skin:  Right flank subcutaneous mass only palpable with torso flexion  Difficult to palpate subcut mass under the left inframammary fold       A/P:  59 y.o. male presenting with subcutaneous masses  -US ST right flank and left chest wall  -RTC after imaging  -    Ericka Tirado MD  General Surgery   [phone]        1/9/2025  3:08 PM  "

## 2025-01-30 ENCOUNTER — HOSPITAL ENCOUNTER (OUTPATIENT)
Dept: RADIOLOGY | Facility: HOSPITAL | Age: 60
Discharge: HOME | End: 2025-01-30
Attending: STUDENT IN AN ORGANIZED HEALTH CARE EDUCATION/TRAINING PROGRAM
Payer: MEDICAID

## 2025-01-30 DIAGNOSIS — L98.8: ICD-10-CM

## 2025-01-30 PROCEDURE — 76604 US EXAM CHEST: CPT | Mod: TC

## 2025-02-06 ENCOUNTER — OFFICE VISIT (OUTPATIENT)
Dept: SURGERY | Facility: CLINIC | Age: 60
End: 2025-02-06
Payer: MEDICAID

## 2025-02-06 VITALS
HEART RATE: 57 BPM | HEIGHT: 70 IN | SYSTOLIC BLOOD PRESSURE: 119 MMHG | DIASTOLIC BLOOD PRESSURE: 58 MMHG | WEIGHT: 190 LBS | RESPIRATION RATE: 18 BRPM | BODY MASS INDEX: 27.2 KG/M2 | OXYGEN SATURATION: 98 %

## 2025-02-06 DIAGNOSIS — D17.1 LIPOMA OF TORSO: Primary | ICD-10-CM

## 2025-02-06 DIAGNOSIS — Z01.818 PRE-OP TESTING: ICD-10-CM

## 2025-02-06 PROCEDURE — 99999 PR PBB SHADOW E&M-EST. PATIENT-LVL IV: CPT | Mod: PBBFAC,,, | Performed by: STUDENT IN AN ORGANIZED HEALTH CARE EDUCATION/TRAINING PROGRAM

## 2025-02-06 PROCEDURE — 99214 OFFICE O/P EST MOD 30 MIN: CPT | Mod: PBBFAC | Performed by: STUDENT IN AN ORGANIZED HEALTH CARE EDUCATION/TRAINING PROGRAM

## 2025-02-13 RX ORDER — CLINDAMYCIN PHOSPHATE 900 MG/50ML
900 INJECTION, SOLUTION INTRAVENOUS
OUTPATIENT
Start: 2025-02-13

## 2025-02-13 RX ORDER — ONDANSETRON HYDROCHLORIDE 2 MG/ML
4 INJECTION, SOLUTION INTRAVENOUS EVERY 12 HOURS PRN
OUTPATIENT
Start: 2025-02-13

## 2025-02-13 RX ORDER — SODIUM CHLORIDE 9 MG/ML
INJECTION, SOLUTION INTRAVENOUS CONTINUOUS
OUTPATIENT
Start: 2025-02-13
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
## 2025-02-20 ENCOUNTER — HOSPITAL ENCOUNTER (OUTPATIENT)
Dept: PULMONOLOGY | Facility: HOSPITAL | Age: 60
Discharge: HOME | End: 2025-02-20
Attending: STUDENT IN AN ORGANIZED HEALTH CARE EDUCATION/TRAINING PROGRAM
Payer: MEDICAID

## 2025-02-20 ENCOUNTER — HOSPITAL ENCOUNTER (OUTPATIENT)
Dept: PREADMISSION TESTING | Facility: HOSPITAL | Age: 60
Discharge: HOME | End: 2025-02-20
Attending: STUDENT IN AN ORGANIZED HEALTH CARE EDUCATION/TRAINING PROGRAM
Payer: MEDICAID

## 2025-02-20 VITALS — BODY MASS INDEX: 27.2 KG/M2 | WEIGHT: 190 LBS | HEIGHT: 70 IN

## 2025-02-20 DIAGNOSIS — Z01.818 PRE-OP TESTING: ICD-10-CM

## 2025-02-20 DIAGNOSIS — Z01.818 PRE-OP TESTING: Primary | ICD-10-CM

## 2025-02-20 LAB
OHS QRS DURATION: 86 MS
OHS QTC CALCULATION: 405 MS

## 2025-02-20 PROCEDURE — 93005 ELECTROCARDIOGRAM TRACING: CPT

## 2025-02-20 NOTE — DISCHARGE INSTRUCTIONS
"         PREPARING SKIN BEFORE SURGERY      Preparing skin before surgery can reduce the risk of infection at surgical site. To make the process easier this facility has chosen disposable cloths moistened with rinse free 2% Chlorhexidine Gluconate antiseptic solution. The steps below outline the prepping process and should be carefully followed.        DIRECTIONS:     Shower or bathe the night prior to surgery, wait at least 2 hours before prepping skin with disposable cloths. Once  prepping begins do not apply lotions, moisturizers or make up.         * Open prep wipes by holding top of package in one hand and and lift flap on backside with the other hand.      * Prep from the jaw line down using all cloths in package.       * Avoid contact with eyes, ears and mouth.     Prep is used on:    1. Neck, shoulders, arms, underarms and chest.    2. Both hands, between fingers.    3. Abdomen, groin and perineum.     4. Right leg, foot and between toes.    5. Left leg, foot and between toes.    6. Back, back of neck and buttocks.     Allow areas to air dry 1 minute. Do not wipe off or rinse. It is normal for the skin to have a temporary "tacky" feel for several minutes after the antiseptic solutions is applied.     Shower or bathe the morning of surgery.BEFORE THE PROCEDURE:    REPORT ANY CHANGE IN YOUR PHYSICAL CONDITION TO YOUR DOCTOR IMMEDIATELY.  SELF ISOLATE AND CHECK TEMPERATURE DAILY, IF TEMP OVER 100, CALL PHYSICIAN IMMEDIATELY.  TRY TO REFRAIN FROM SMOKING AND ALCOHOL 72 HOURS BEFORE YOUR PROCEDURE.   DO NOT EAT AFTER MIDNIGHT THE NIGHT BEFORE YOUR PROCEDURE. DO NOT DRINK ANYTHING AFTER MIDNIGHT EXCEPT YOU CAN HAVE WATER ONLY UP TO 4 HOURS PRIOR TO ARRIVAL.  NO MAKE UP, NAIL POLISH OR JEWELRY.      SURGERY PREP INSTRUCTIONS    SOMEONE WILL CALL YOU THE DAY BEFORE YOUR PROCEDURE WITH A CHECK-IN TIME FOR YOUR PROCEDURE.    DAY OF YOUR PROCEDURE:    TAKE BLOOD PRESSURE MEDICATIONS THE MORNING OF YOUR PROCEDURE, WITH " SMALL SIPS WATER, AS DIRECTED BY YOUR PHYSICIAN.   DO NOT TAKE ANY DIABETIC MEDICATIONS UNLESS DIRECTED TO DO SO BY YOUR PHYSICIAN.   CONTACT LENSES AND DENTURES MUST BE REMOVED.  A RESPONSIBLE ADULT MUST ACCOMPANY YOU HOME UPON DISCHARGE.   ONLY 1 VISITOR ALLOWED PER ROOM.     YOUR THOUGHTS AND OPINIONS HELP US TO BETTER SERVE YOU.     PLEASE PARTICIPATE IN SURVEYS ABOUT YOUR CARE.    THANK YOU FOR CHOOSING [facility].
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
## 2025-03-11 ENCOUNTER — ANESTHESIA EVENT (OUTPATIENT)
Dept: SURGERY | Facility: HOSPITAL | Age: 60
End: 2025-03-11
Payer: MEDICAID

## 2025-03-11 NOTE — ANESTHESIA PREPROCEDURE EVALUATION
03/11/2025  Arnulfo Fay is a 59 y.o., male.      Pre-op Assessment    I have reviewed the Patient Summary Reports.    I have reviewed the NPO Status.   I have reviewed the Medications.     Review of Systems  Anesthesia Hx:  No problems with previous Anesthesia             Denies Family Hx of Anesthesia complications.    Denies Personal Hx of Anesthesia complications.                    Social:  Former Smoker       Cardiovascular:  Cardiovascular Normal                  ECG has been reviewed.                            Pulmonary:  Pulmonary Normal                       Renal/:  Renal/ Normal                 Hepatic/GI:     GERD, well controlled                Musculoskeletal:  Arthritis               Neurological:       Seizures, well controlled                                Endocrine:  Endocrine Normal            Psych:    depression              Lab Results   Component Value Date    WBC 7.62 02/20/2025    HGB 13.3 (L) 02/20/2025    HCT 41.0 02/20/2025    MCV 93 02/20/2025     02/20/2025      CMP  Sodium   Date Value Ref Range Status   02/20/2025 141 136 - 145 mmol/L Final     Potassium   Date Value Ref Range Status   02/20/2025 3.8 3.5 - 5.1 mmol/L Final     Chloride   Date Value Ref Range Status   02/20/2025 104 95 - 110 mmol/L Final     CO2   Date Value Ref Range Status   02/20/2025 29 23 - 29 mmol/L Final     Glucose   Date Value Ref Range Status   02/20/2025 150 (H) 70 - 110 mg/dL Final     BUN   Date Value Ref Range Status   02/20/2025 9 6 - 20 mg/dL Final     Creatinine   Date Value Ref Range Status   02/20/2025 0.6 0.5 - 1.4 mg/dL Final     Calcium   Date Value Ref Range Status   02/20/2025 9.0 8.7 - 10.5 mg/dL Final     Total Protein   Date Value Ref Range Status   02/20/2025 7.5 6.0 - 8.4 g/dL Final     Albumin   Date Value Ref Range Status   02/20/2025 4.2 3.5 - 5.2 g/dL  Final     Total Bilirubin   Date Value Ref Range Status   02/20/2025 0.2 0.1 - 1.0 mg/dL Final     Comment:     For infants and newborns, interpretation of results should be based  on gestational age, weight and in agreement with clinical  observations.    Premature Infant recommended reference ranges:  Up to 24 hours.............<8.0 mg/dL  Up to 48 hours............<12.0 mg/dL  3-5 days..................<15.0 mg/dL  6-29 days.................<15.0 mg/dL       Alkaline Phosphatase   Date Value Ref Range Status   02/20/2025 159 (H) 55 - 135 U/L Final     AST   Date Value Ref Range Status   02/20/2025 18 10 - 40 U/L Final     ALT   Date Value Ref Range Status   02/20/2025 13 10 - 44 U/L Final     Anion Gap   Date Value Ref Range Status   02/20/2025 8 8 - 16 mmol/L Final     eGFR   Date Value Ref Range Status   02/20/2025 >60.0 >60 mL/min/1.73 m^2 Final   05/09/2020 189 >60- mL/min/1.73m Final        Physical Exam  General: Well nourished    Airway:  Mallampati: I / I  Mouth Opening: Normal  TM Distance: Normal  Tongue: Normal  Neck ROM: Normal ROM    Dental:  Dentures    Chest/Lungs:  Clear to auscultation    Heart:  Rate: Normal  Rhythm: Regular Rhythm  Sounds: Normal        Anesthesia Plan  Type of Anesthesia, risks & benefits discussed:    Anesthesia Type: MAC, Gen Supraglottic Airway, Gen ETT  Intra-op Monitoring Plan: Standard ASA Monitors  Post Op Pain Control Plan: multimodal analgesia  Induction:  IV  Airway Plan: Direct  Informed Consent: Informed consent signed with the Patient and all parties understand the risks and agree with anesthesia plan.  All questions answered.   ASA Score: 3  Day of Surgery Review of History & Physical: I have interviewed and examined the patient. I have reviewed the patient's H&P dated: There are no significant changes.     Ready For Surgery From Anesthesia Perspective.     .

## 2025-03-12 ENCOUNTER — ANESTHESIA (OUTPATIENT)
Dept: SURGERY | Facility: HOSPITAL | Age: 60
End: 2025-03-12
Payer: MEDICAID

## 2025-03-12 ENCOUNTER — HOSPITAL ENCOUNTER (OUTPATIENT)
Facility: HOSPITAL | Age: 60
Discharge: HOME | End: 2025-03-12
Attending: STUDENT IN AN ORGANIZED HEALTH CARE EDUCATION/TRAINING PROGRAM | Admitting: STUDENT IN AN ORGANIZED HEALTH CARE EDUCATION/TRAINING PROGRAM
Payer: MEDICAID

## 2025-03-12 DIAGNOSIS — G89.18 ACUTE POST-OPERATIVE PAIN: ICD-10-CM

## 2025-03-12 DIAGNOSIS — D17.1 LIPOMA OF TORSO: Primary | ICD-10-CM

## 2025-03-12 PROCEDURE — 21931 EXC BACK LES SC 3 CM/>: CPT | Mod: ,,, | Performed by: STUDENT IN AN ORGANIZED HEALTH CARE EDUCATION/TRAINING PROGRAM

## 2025-03-12 PROCEDURE — 37000009 HC ANESTHESIA EA ADD 15 MINS: Performed by: STUDENT IN AN ORGANIZED HEALTH CARE EDUCATION/TRAINING PROGRAM

## 2025-03-12 PROCEDURE — 63600175 PHARM REV CODE 636 W HCPCS: Performed by: NURSE ANESTHETIST, CERTIFIED REGISTERED

## 2025-03-12 PROCEDURE — 36000706: Performed by: STUDENT IN AN ORGANIZED HEALTH CARE EDUCATION/TRAINING PROGRAM

## 2025-03-12 PROCEDURE — 37000008 HC ANESTHESIA 1ST 15 MINUTES: Performed by: STUDENT IN AN ORGANIZED HEALTH CARE EDUCATION/TRAINING PROGRAM

## 2025-03-12 PROCEDURE — 25000003 PHARM REV CODE 250: Performed by: STUDENT IN AN ORGANIZED HEALTH CARE EDUCATION/TRAINING PROGRAM

## 2025-03-12 PROCEDURE — 36000707: Performed by: STUDENT IN AN ORGANIZED HEALTH CARE EDUCATION/TRAINING PROGRAM

## 2025-03-12 PROCEDURE — 71000015 HC POSTOP RECOV 1ST HR: Performed by: STUDENT IN AN ORGANIZED HEALTH CARE EDUCATION/TRAINING PROGRAM

## 2025-03-12 PROCEDURE — 63600175 PHARM REV CODE 636 W HCPCS: Performed by: STUDENT IN AN ORGANIZED HEALTH CARE EDUCATION/TRAINING PROGRAM

## 2025-03-12 RX ORDER — HYDROCODONE BITARTRATE AND ACETAMINOPHEN 7.5; 325 MG/1; MG/1
1 TABLET ORAL EVERY 6 HOURS PRN
Qty: 28 TABLET | Refills: 0 | Status: SHIPPED | OUTPATIENT
Start: 2025-03-12 | End: 2025-03-19

## 2025-03-12 RX ORDER — ONDANSETRON HYDROCHLORIDE 2 MG/ML
INJECTION, SOLUTION INTRAVENOUS
Status: DISCONTINUED | OUTPATIENT
Start: 2025-03-12 | End: 2025-03-12

## 2025-03-12 RX ORDER — METHOCARBAMOL 750 MG/1
750 TABLET, FILM COATED ORAL 4 TIMES DAILY
Qty: 40 TABLET | Refills: 0 | Status: SHIPPED | OUTPATIENT
Start: 2025-03-12 | End: 2025-03-22

## 2025-03-12 RX ORDER — PROPOFOL 10 MG/ML
INJECTION, EMULSION INTRAVENOUS
Status: DISCONTINUED | OUTPATIENT
Start: 2025-03-12 | End: 2025-03-12

## 2025-03-12 RX ORDER — BUPIVACAINE HYDROCHLORIDE 5 MG/ML
INJECTION, SOLUTION EPIDURAL; INTRACAUDAL; PERINEURAL
Status: DISCONTINUED | OUTPATIENT
Start: 2025-03-12 | End: 2025-03-12 | Stop reason: HOSPADM

## 2025-03-12 RX ORDER — CLINDAMYCIN PHOSPHATE 900 MG/50ML
900 INJECTION, SOLUTION INTRAVENOUS
Status: COMPLETED | OUTPATIENT
Start: 2025-03-12 | End: 2025-03-12

## 2025-03-12 RX ORDER — ONDANSETRON HYDROCHLORIDE 2 MG/ML
4 INJECTION, SOLUTION INTRAVENOUS EVERY 12 HOURS PRN
Status: DISCONTINUED | OUTPATIENT
Start: 2025-03-12 | End: 2025-03-12 | Stop reason: HOSPADM

## 2025-03-12 RX ORDER — FENTANYL CITRATE 50 UG/ML
INJECTION, SOLUTION INTRAMUSCULAR; INTRAVENOUS
Status: DISCONTINUED | OUTPATIENT
Start: 2025-03-12 | End: 2025-03-12

## 2025-03-12 RX ORDER — MIDAZOLAM HYDROCHLORIDE 1 MG/ML
INJECTION INTRAMUSCULAR; INTRAVENOUS
Status: DISCONTINUED | OUTPATIENT
Start: 2025-03-12 | End: 2025-03-12

## 2025-03-12 RX ORDER — SODIUM CHLORIDE 9 MG/ML
INJECTION, SOLUTION INTRAVENOUS CONTINUOUS
Status: DISCONTINUED | OUTPATIENT
Start: 2025-03-12 | End: 2025-03-12 | Stop reason: HOSPADM

## 2025-03-12 RX ORDER — LIDOCAINE HYDROCHLORIDE 10 MG/ML
INJECTION, SOLUTION EPIDURAL; INFILTRATION; INTRACAUDAL; PERINEURAL
Status: DISCONTINUED | OUTPATIENT
Start: 2025-03-12 | End: 2025-03-12

## 2025-03-12 RX ADMIN — PROPOFOL 50 MG: 10 INJECTION, EMULSION INTRAVENOUS at 10:03

## 2025-03-12 RX ADMIN — ONDANSETRON 4 MG: 2 INJECTION INTRAMUSCULAR; INTRAVENOUS at 10:03

## 2025-03-12 RX ADMIN — FENTANYL CITRATE 50 MCG: 50 INJECTION INTRAMUSCULAR; INTRAVENOUS at 10:03

## 2025-03-12 RX ADMIN — SODIUM CHLORIDE: 9 INJECTION, SOLUTION INTRAVENOUS at 07:03

## 2025-03-12 RX ADMIN — LIDOCAINE HYDROCHLORIDE 50 MG: 10 INJECTION, SOLUTION EPIDURAL; INFILTRATION; INTRACAUDAL; PERINEURAL at 10:03

## 2025-03-12 RX ADMIN — MIDAZOLAM 2 MG: 1 INJECTION INTRAMUSCULAR; INTRAVENOUS at 10:03

## 2025-03-12 RX ADMIN — CLINDAMYCIN PHOSPHATE 900 MG: 900 INJECTION, SOLUTION INTRAVENOUS at 10:03

## 2025-03-12 RX ADMIN — PROPOFOL 50 MG: 10 INJECTION, EMULSION INTRAVENOUS at 11:03

## 2025-03-12 NOTE — OP NOTE
[facility] - OR Periop Services  General Surgery Department  Operative Note    SUMMARY     Date of Procedure: 3/12/2025    Procedure: Procedure(s) (LRB):  EXCISION, LIPOMA, flank: 03178 (CPT®)    right    Surgeon(s) and Role:  Ericka Tirado MD     Pre-Operative Diagnosis: Pre-Op Diagnosis Codes:      * Lipoma of torso [D17.1]    Post-Operative Diagnosis: Same         Anesthesia: Local MAC    Findings:  3.5 x 4.0 x 0.3 cm right flank lipoma excised       Indications for the Procedure:  58yo male presenting for excision of right flank lipoma.     Operative Conduct in Detail:      Patient was seen in holding where surgical sites were marked and informed consent verified.  Preop antibiotics preoperative antibiotics were hung in day surgery..  Patient was then wheeled to the operating room and placed in the left lateral decubitus position where SCDs were placed and activated, and MAC anesthesia was administered.  The right flank was prepped and draped in sterile fashion.  A time-out was performed in the operating room with all present in agreement.      Local anesthetic was infiltrated and a transverse incision was created over the apex of the  lipoma.  The incision was carried down with electrocautery through skin and subcutaneous tissue until the lipomatous fat was encountered.  The adhesions around the lipoma were carefully excised with a combination of blunt dissection electrocautery.  Lipoma was delivered from the cavity measuring 3.5 x 4.0 x 0.3 cm cm.  Hemostasis was achieved and the wound was copiously irrigated with normal saline.  The wound was then closed with 2-0 Vicryl deep dermal interrupted sutures and skin closed with 4-0 monocryl running subcuticular suture..  Sterile dressing applied.  Patient then awakened from anesthesia and extubated in the OR with no untoward event.  All lap and instrument counts were correct x2 prior to and after wound closure.      Complications: No    Estimated Blood  Loss (EBL): Minimal           Implants: * No implants in log *    Specimens:   Specimens (From admission, onward)       Start     Ordered    03/12/25 1058  Specimen to Pathology, Surgery General Surgery  Once        Comments: EXCISION, LIPOMA, flank - Ericka Rodríguez MDRight flank lipoma @1058     References:    Click here for ordering Quick Tip   Question Answer Comment   Procedure Type: General Surgery    Release to patient Immediate        03/12/25 1107                             Condition: Good    Disposition: PACU - hemodynamically stable.        Ericka Tirado MD  General Surgery   [phone]

## 2025-03-12 NOTE — DISCHARGE SUMMARY
[facility] - Surgery  Discharge Note  Short Stay    Procedure(s) (LRB):  EXCISION, LIPOMA, flank (Right)      OUTCOME: Patient tolerated treatment/procedure well without complication and is now ready for discharge.    DISPOSITION: Home or Self Care    FINAL DIAGNOSIS:  Lipoma of torso    FOLLOWUP: In clinic    DISCHARGE INSTRUCTIONS:    Discharge Procedure Orders   Diet Adult Regular   Order Comments: Low fat     Lifting restrictions   Order Comments: No lifting, pushing, or pulling greater than 10lbs for 6 weeks to reduce risk of hernia   You may return to work in one week if you are able to adhere to the lifting restriction above  If you are unable to perform your regular duties with the restrictions, please contact Dr. Tirado's office     No driving until:   Order Comments: No driving while on narcotic pain medication  No driving for two weeks until follow up with Dr. Tirado     No dressing needed   Order Comments: Keep dry for 24 hours   After 24 hours, you may shower daily and allow soapy water to run over incisions  Skin glue will fall off on its own with time  Do not scrub or submerge in water for two weeks     Notify your health care provider if you experience any of the following:  temperature >100.4     Notify your health care provider if you experience any of the following:  persistent nausea and vomiting or diarrhea     Notify your health care provider if you experience any of the following:  severe uncontrolled pain     Notify your health care provider if you experience any of the following:  redness, tenderness, or signs of infection (pain, swelling, redness, odor or green/yellow discharge around incision site)     Activity as tolerated        TIME SPENT ON DISCHARGE: 8 minutes

## 2025-03-12 NOTE — TRANSFER OF CARE
Anesthesia Transfer of Care Note    Patient: Arnulfo Fay    Procedure(s) Performed: Procedure(s) (LRB):  EXCISION, LIPOMA, flank (Right)    Patient location: Other: OR    Transport from OR: Transported from OR on room air with adequate spontaneous ventilation    Post pain: adequate analgesia    Post assessment: no apparent anesthetic complications    Post vital signs: stable    Level of consciousness: awake    Nausea/Vomiting: no nausea/vomiting    Complications: none    Transfer of care protocol was followed      Last vitals:   90/55  16 RR  59 HR  98% O2 SAT  37 C TEMP
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
## 2025-03-12 NOTE — DISCHARGE INSTRUCTIONS
FOLLOW UP WITH DR HALL AS INSTRUCTED.  DO NOT DRINK ALCOHOL AND NO DRIVING FOR THE NEXT 24 HOURS    CALL DR HALL'S OFFICE FOR ANY QUESTIONS OR CONCERNS.  REPORT TO THE ER IF URGENT.    THANK YOU FOR CHOOSING [facility]!

## 2025-03-12 NOTE — PLAN OF CARE
"Patient prepared for procedure. Friend at the bedside. No complaints or concerns voiced at present time. Denies any pain. Lipoma located to right back. Pt reports being "nervous about anesthesia." 20g IV inserted to right AC, infusing. No s/s of infiltration noted. Bed locked and in lowest position to the floor. Belongings placed at the bedside. Call bell placed in reach for any needs.  "

## 2025-03-12 NOTE — PLAN OF CARE
Patient back from procedure.  Awake and alert.  V/S WNL.  Tolerating snack well.  Denies c/o pain or discomfort at this time.  Kianna Strange, DANYELLEN
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
## 2025-03-13 ENCOUNTER — OFFICE VISIT (OUTPATIENT)
Dept: SURGERY | Facility: CLINIC | Age: 60
End: 2025-03-13
Payer: MEDICAID

## 2025-03-13 VITALS
SYSTOLIC BLOOD PRESSURE: 113 MMHG | HEART RATE: 78 BPM | HEIGHT: 70 IN | RESPIRATION RATE: 18 BRPM | OXYGEN SATURATION: 95 % | BODY MASS INDEX: 27.49 KG/M2 | DIASTOLIC BLOOD PRESSURE: 78 MMHG | WEIGHT: 192 LBS

## 2025-03-13 DIAGNOSIS — Z98.890 S/P EXCISION OF LIPOMA: Primary | ICD-10-CM

## 2025-03-13 DIAGNOSIS — Z86.018 S/P EXCISION OF LIPOMA: Primary | ICD-10-CM

## 2025-03-13 PROCEDURE — 99213 OFFICE O/P EST LOW 20 MIN: CPT | Mod: PBBFAC | Performed by: STUDENT IN AN ORGANIZED HEALTH CARE EDUCATION/TRAINING PROGRAM

## 2025-03-13 PROCEDURE — 99999 PR PBB SHADOW E&M-EST. PATIENT-LVL III: CPT | Mod: PBBFAC,,, | Performed by: STUDENT IN AN ORGANIZED HEALTH CARE EDUCATION/TRAINING PROGRAM

## 2025-03-13 NOTE — ANESTHESIA POSTPROCEDURE EVALUATION
Anesthesia Post Evaluation    Patient: Arnulfo Fay    Procedure(s) Performed: Procedure(s) (LRB):  EXCISION, LIPOMA, flank (Right)    Final Anesthesia Type: MAC      Patient location during evaluation: OPS  Patient participation: Yes- Able to Participate  Level of consciousness: awake  Post-procedure vital signs: reviewed and stable  Pain management: adequate  Airway patency: patent    PONV status at discharge: No PONV  Anesthetic complications: no      Cardiovascular status: blood pressure returned to baseline  Respiratory status: spontaneous ventilation  Hydration status: euvolemic  Follow-up not needed.              Vitals Value Taken Time   /63 03/12/25 11:22   Temp 36.7 °C (98 °F) 03/12/25 11:22   Pulse 64 03/12/25 11:22   Resp 16 03/12/25 11:22   SpO2 96 % 03/12/25 11:22         No case tracking events are documented in the log.      Pain/Gus Score: Gus Score: 10 (3/12/2025 11:31 AM)

## 2025-03-17 VITALS
BODY MASS INDEX: 27.35 KG/M2 | HEART RATE: 64 BPM | OXYGEN SATURATION: 96 % | TEMPERATURE: 98 F | SYSTOLIC BLOOD PRESSURE: 109 MMHG | RESPIRATION RATE: 16 BRPM | DIASTOLIC BLOOD PRESSURE: 63 MMHG | HEIGHT: 70 IN | WEIGHT: 191 LBS

## 2025-03-17 LAB — SPECIMEN TO PATHOLOGY - SURGICAL: NORMAL

## 2025-03-21 NOTE — PROGRESS NOTES
"[facility]  General Surgery Clinic Progress Note    HPI:  Arnulfo Fay is a 59 y.o. male s/p excision right flank cyst who presents for follow up.  Voices no complaints.      ROS:  Negative except above    PE:  Vitals:    03/13/25 0925   BP: 113/78   BP Location: Left arm   Patient Position: Sitting   Pulse: 78   Resp: 18   SpO2: 95%   Weight: 87.1 kg (192 lb)   Height: 5' 10" (1.778 m)        Gen: Alert and oriented x3, judgement intact, NAD  CV: RRR  Resp: CTAB; breaths non-labored and symmetrical  Abd: Soft, NT, ND, BS+  Extremities: No c/c/e  Skin - right flank incision C/D/I with derma bond in place     A/P:  59 y.o. male s/p excision right flank cyst who presents for post op follow up  -OK to shower daily   -No fishing for two weeks   -RTC 2 weeks      Ericka Tirado MD  General Surgery   [phone]        3/21/2025  3:04 PM  "

## 2025-04-03 ENCOUNTER — OFFICE VISIT (OUTPATIENT)
Dept: SURGERY | Facility: CLINIC | Age: 60
End: 2025-04-03
Payer: MEDICAID

## 2025-04-03 VITALS
DIASTOLIC BLOOD PRESSURE: 66 MMHG | RESPIRATION RATE: 18 BRPM | SYSTOLIC BLOOD PRESSURE: 110 MMHG | WEIGHT: 190.31 LBS | OXYGEN SATURATION: 98 % | HEART RATE: 68 BPM | HEIGHT: 70 IN | BODY MASS INDEX: 27.24 KG/M2

## 2025-04-03 DIAGNOSIS — Z98.890 S/P EXCISION OF LIPOMA: ICD-10-CM

## 2025-04-03 DIAGNOSIS — D17.1 LIPOMA OF TORSO: Primary | ICD-10-CM

## 2025-04-03 DIAGNOSIS — Z86.018 S/P EXCISION OF LIPOMA: ICD-10-CM

## 2025-04-03 PROCEDURE — 3078F DIAST BP <80 MM HG: CPT | Mod: CPTII,,, | Performed by: STUDENT IN AN ORGANIZED HEALTH CARE EDUCATION/TRAINING PROGRAM

## 2025-04-03 PROCEDURE — 99213 OFFICE O/P EST LOW 20 MIN: CPT | Mod: PBBFAC | Performed by: STUDENT IN AN ORGANIZED HEALTH CARE EDUCATION/TRAINING PROGRAM

## 2025-04-03 PROCEDURE — 3074F SYST BP LT 130 MM HG: CPT | Mod: CPTII,,, | Performed by: STUDENT IN AN ORGANIZED HEALTH CARE EDUCATION/TRAINING PROGRAM

## 2025-04-03 PROCEDURE — 1159F MED LIST DOCD IN RCRD: CPT | Mod: CPTII,,, | Performed by: STUDENT IN AN ORGANIZED HEALTH CARE EDUCATION/TRAINING PROGRAM

## 2025-04-03 PROCEDURE — 99024 POSTOP FOLLOW-UP VISIT: CPT | Mod: ,,, | Performed by: STUDENT IN AN ORGANIZED HEALTH CARE EDUCATION/TRAINING PROGRAM

## 2025-04-03 PROCEDURE — 99999 PR PBB SHADOW E&M-EST. PATIENT-LVL III: CPT | Mod: PBBFAC,,, | Performed by: STUDENT IN AN ORGANIZED HEALTH CARE EDUCATION/TRAINING PROGRAM

## 2025-04-03 NOTE — PROGRESS NOTES
"[facility]  General Surgery Clinic Progress Note    HPI:  Arnulfo Fay is a 59 y.o. male s/p excision right flank lipoma who presents for follow up.  Voices no complaints.  unchanged    ROS:  Negative except above    PE:  Vitals:    04/03/25 1331   BP: 110/66   Pulse: 68   Resp: 18   SpO2: 98%   Weight: 86.3 kg (190 lb 4.8 oz)   Height: 5' 10" (1.778 m)        Gen: Alert and oriented x3, judgement intact, NAD  CV: RRR  Resp: CTAB; breaths non-labored and symmetrical  Abd: Soft, NT, ND, BS+  Extremities: No c/c/e  Skin - right flank incision well healed with no SOI    Pathology:      A/P:  59 y.o. male s/p excision right flank cyst who presents for post op follow up  -incision well healed  -path benign  -no restrictions  -RTC PRN     Ericka Tirado MD  General Surgery   [phone]        4/3/2025  3:04 PM    "

## 2025-04-06 ENCOUNTER — HOSPITAL ENCOUNTER (EMERGENCY)
Facility: HOSPITAL | Age: 60
Discharge: HOME | End: 2025-04-06
Attending: EMERGENCY MEDICINE
Payer: MEDICAID

## 2025-04-06 VITALS
HEART RATE: 59 BPM | WEIGHT: 185 LBS | OXYGEN SATURATION: 95 % | TEMPERATURE: 99 F | BODY MASS INDEX: 26.48 KG/M2 | RESPIRATION RATE: 30 BRPM | HEIGHT: 70 IN | SYSTOLIC BLOOD PRESSURE: 154 MMHG | DIASTOLIC BLOOD PRESSURE: 81 MMHG

## 2025-04-06 DIAGNOSIS — R07.9 CHEST PAIN: ICD-10-CM

## 2025-04-06 LAB
ABSOLUTE EOSINOPHIL (OHS): 0.54 K/UL
ABSOLUTE MONOCYTE (OHS): 0.69 K/UL (ref 0.3–1)
ABSOLUTE NEUTROPHIL COUNT (OHS): 4.34 K/UL (ref 1.8–7.7)
ALBUMIN SERPL BCP-MCNC: 4.2 G/DL (ref 3.5–5.2)
ALP SERPL-CCNC: 153 UNIT/L (ref 40–150)
ALT SERPL W/O P-5'-P-CCNC: 14 UNIT/L (ref 10–44)
ANION GAP (OHS): 10 MMOL/L (ref 8–16)
AST SERPL-CCNC: 23 UNIT/L (ref 11–45)
BASOPHILS # BLD AUTO: 0.02 K/UL
BASOPHILS NFR BLD AUTO: 0.3 %
BILIRUB SERPL-MCNC: 0.2 MG/DL (ref 0.1–1)
BUN SERPL-MCNC: 14 MG/DL (ref 6–20)
CALCIUM SERPL-MCNC: 8.9 MG/DL (ref 8.7–10.5)
CHLORIDE SERPL-SCNC: 104 MMOL/L (ref 95–110)
CO2 SERPL-SCNC: 27 MMOL/L (ref 23–29)
CREAT SERPL-MCNC: 0.6 MG/DL (ref 0.5–1.4)
ERYTHROCYTE [DISTWIDTH] IN BLOOD BY AUTOMATED COUNT: 11.6 % (ref 11.5–14.5)
GFR SERPLBLD CREATININE-BSD FMLA CKD-EPI: >60 ML/MIN/1.73/M2
GLUCOSE SERPL-MCNC: 93 MG/DL (ref 70–110)
HCT VFR BLD AUTO: 37.3 % (ref 40–54)
HGB BLD-MCNC: 12.6 GM/DL (ref 14–18)
IMM GRANULOCYTES # BLD AUTO: 0.02 K/UL (ref 0–0.04)
IMM GRANULOCYTES NFR BLD AUTO: 0.3 % (ref 0–0.5)
LYMPHOCYTES # BLD AUTO: 2.26 K/UL (ref 1–4.8)
MCH RBC QN AUTO: 30.3 PG (ref 27–31)
MCHC RBC AUTO-ENTMCNC: 33.8 G/DL (ref 32–36)
MCV RBC AUTO: 90 FL (ref 82–98)
NT-PROBNP SERPL-MCNC: 70 PG/ML
NUCLEATED RBC (/100WBC) (OHS): 0 /100 WBC
PLATELET # BLD AUTO: 218 K/UL (ref 150–450)
PMV BLD AUTO: 9.4 FL (ref 9.2–12.9)
POTASSIUM SERPL-SCNC: 3.4 MMOL/L (ref 3.5–5.1)
PROT SERPL-MCNC: 7.4 GM/DL (ref 6–8.4)
RBC # BLD AUTO: 4.16 M/UL (ref 4.6–6.2)
RELATIVE EOSINOPHIL (OHS): 6.9 %
RELATIVE LYMPHOCYTE (OHS): 28.7 % (ref 18–48)
RELATIVE MONOCYTE (OHS): 8.8 % (ref 4–15)
RELATIVE NEUTROPHIL (OHS): 55 % (ref 38–73)
SODIUM SERPL-SCNC: 141 MMOL/L (ref 136–145)
TROPONIN I SERPL HS-MCNC: 6 NG/L
WBC # BLD AUTO: 7.87 K/UL (ref 3.9–12.7)

## 2025-04-06 PROCEDURE — 85025 COMPLETE CBC W/AUTO DIFF WBC: CPT | Performed by: EMERGENCY MEDICINE

## 2025-04-06 PROCEDURE — 80053 COMPREHEN METABOLIC PANEL: CPT | Performed by: EMERGENCY MEDICINE

## 2025-04-06 PROCEDURE — 99285 EMERGENCY DEPT VISIT HI MDM: CPT | Mod: 25

## 2025-04-06 PROCEDURE — 93005 ELECTROCARDIOGRAM TRACING: CPT

## 2025-04-06 PROCEDURE — 36415 COLL VENOUS BLD VENIPUNCTURE: CPT | Performed by: EMERGENCY MEDICINE

## 2025-04-06 PROCEDURE — 93010 ELECTROCARDIOGRAM REPORT: CPT | Mod: ,,, | Performed by: STUDENT IN AN ORGANIZED HEALTH CARE EDUCATION/TRAINING PROGRAM

## 2025-04-06 PROCEDURE — 83880 ASSAY OF NATRIURETIC PEPTIDE: CPT | Performed by: EMERGENCY MEDICINE

## 2025-04-06 PROCEDURE — 84484 ASSAY OF TROPONIN QUANT: CPT | Performed by: EMERGENCY MEDICINE

## 2025-04-06 RX ORDER — SUCRALFATE 1 G/1
1 TABLET ORAL
Qty: 20 TABLET | Refills: 0 | Status: SHIPPED | OUTPATIENT
Start: 2025-04-06

## 2025-04-07 LAB
OHS QRS DURATION: 94 MS
OHS QTC CALCULATION: 434 MS
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
## 2025-04-13 ENCOUNTER — HOSPITAL ENCOUNTER (EMERGENCY)
Facility: HOSPITAL | Age: 60
Discharge: HOME | End: 2025-04-13
Attending: EMERGENCY MEDICINE
Payer: MEDICAID

## 2025-04-13 VITALS
RESPIRATION RATE: 17 BRPM | BODY MASS INDEX: 26.89 KG/M2 | WEIGHT: 187.81 LBS | TEMPERATURE: 99 F | HEART RATE: 74 BPM | HEIGHT: 70 IN | DIASTOLIC BLOOD PRESSURE: 89 MMHG | OXYGEN SATURATION: 98 % | SYSTOLIC BLOOD PRESSURE: 158 MMHG

## 2025-04-13 DIAGNOSIS — B02.9 HERPES ZOSTER WITHOUT COMPLICATION: Primary | ICD-10-CM

## 2025-04-13 PROCEDURE — 99284 EMERGENCY DEPT VISIT MOD MDM: CPT

## 2025-04-13 RX ORDER — DIPHENHYDRAMINE HCL 25 MG
25 CAPSULE ORAL EVERY 8 HOURS PRN
Qty: 20 CAPSULE | Refills: 0 | Status: SHIPPED | OUTPATIENT
Start: 2025-04-13

## 2025-04-13 RX ORDER — GABAPENTIN 100 MG/1
100 CAPSULE ORAL 3 TIMES DAILY
Qty: 42 CAPSULE | Refills: 0 | Status: SHIPPED | OUTPATIENT
Start: 2025-04-13 | End: 2025-04-27

## 2025-04-13 RX ORDER — VALACYCLOVIR HYDROCHLORIDE 1 G/1
1000 TABLET, FILM COATED ORAL 3 TIMES DAILY
Qty: 21 TABLET | Refills: 0 | Status: SHIPPED | OUTPATIENT
Start: 2025-04-13 | End: 2025-04-20

## 2025-04-13 RX ORDER — LIDOCAINE 50 MG/G
1 PATCH TOPICAL DAILY PRN
Qty: 10 PATCH | Refills: 0 | Status: SHIPPED | OUTPATIENT
Start: 2025-04-13 | End: 2025-04-23

## 2025-04-13 RX ORDER — NAPROXEN 500 MG/1
500 TABLET ORAL EVERY 12 HOURS PRN
Qty: 20 TABLET | Refills: 0 | Status: SHIPPED | OUTPATIENT
Start: 2025-04-13
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
## 2025-05-13 ENCOUNTER — HOSPITAL ENCOUNTER (EMERGENCY)
Facility: HOSPITAL | Age: 60
Discharge: HOME | End: 2025-05-13
Attending: EMERGENCY MEDICINE
Payer: MEDICAID

## 2025-05-13 VITALS
HEART RATE: 68 BPM | DIASTOLIC BLOOD PRESSURE: 88 MMHG | RESPIRATION RATE: 20 BRPM | BODY MASS INDEX: 26.76 KG/M2 | SYSTOLIC BLOOD PRESSURE: 139 MMHG | TEMPERATURE: 98 F | WEIGHT: 186.94 LBS | OXYGEN SATURATION: 100 % | HEIGHT: 70 IN

## 2025-05-13 DIAGNOSIS — R07.89 CHEST WALL PAIN: ICD-10-CM

## 2025-05-13 DIAGNOSIS — R52 PAIN: ICD-10-CM

## 2025-05-13 DIAGNOSIS — J20.9 ACUTE BRONCHITIS, UNSPECIFIED ORGANISM: Primary | ICD-10-CM

## 2025-05-13 LAB
OHS QRS DURATION: 88 MS
OHS QTC CALCULATION: 410 MS

## 2025-05-13 PROCEDURE — 96372 THER/PROPH/DIAG INJ SC/IM: CPT | Performed by: NURSE PRACTITIONER

## 2025-05-13 PROCEDURE — 99284 EMERGENCY DEPT VISIT MOD MDM: CPT | Mod: 25

## 2025-05-13 PROCEDURE — 93010 ELECTROCARDIOGRAM REPORT: CPT | Mod: ,,, | Performed by: INTERNAL MEDICINE

## 2025-05-13 PROCEDURE — 93005 ELECTROCARDIOGRAM TRACING: CPT

## 2025-05-13 PROCEDURE — 63600175 PHARM REV CODE 636 W HCPCS: Mod: JZ,TB | Performed by: NURSE PRACTITIONER

## 2025-05-13 RX ORDER — NAPROXEN 500 MG/1
500 TABLET ORAL EVERY 12 HOURS PRN
Qty: 20 TABLET | Refills: 0 | Status: SHIPPED | OUTPATIENT
Start: 2025-05-13

## 2025-05-13 RX ORDER — PREDNISONE 20 MG/1
20 TABLET ORAL DAILY
Qty: 5 TABLET | Refills: 0 | Status: SHIPPED | OUTPATIENT
Start: 2025-05-13 | End: 2025-05-18

## 2025-05-13 RX ORDER — KETOROLAC TROMETHAMINE 30 MG/ML
30 INJECTION, SOLUTION INTRAMUSCULAR; INTRAVENOUS
Status: COMPLETED | OUTPATIENT
Start: 2025-05-13 | End: 2025-05-13

## 2025-05-13 RX ORDER — ALBUTEROL SULFATE 90 UG/1
1-2 INHALANT RESPIRATORY (INHALATION) EVERY 6 HOURS PRN
Qty: 6.7 G | Refills: 1 | Status: SHIPPED | OUTPATIENT
Start: 2025-05-13

## 2025-05-13 RX ADMIN — KETOROLAC TROMETHAMINE 30 MG: 30 INJECTION, SOLUTION INTRAMUSCULAR; INTRAVENOUS at 09:05
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
## 2025-06-02 ENCOUNTER — HOSPITAL ENCOUNTER (EMERGENCY)
Facility: HOSPITAL | Age: 60
Discharge: HOME | End: 2025-06-02
Attending: EMERGENCY MEDICINE
Payer: MEDICAID

## 2025-06-02 VITALS
SYSTOLIC BLOOD PRESSURE: 108 MMHG | HEART RATE: 72 BPM | DIASTOLIC BLOOD PRESSURE: 76 MMHG | WEIGHT: 183.19 LBS | BODY MASS INDEX: 26.22 KG/M2 | OXYGEN SATURATION: 97 % | RESPIRATION RATE: 17 BRPM | HEIGHT: 70 IN | TEMPERATURE: 99 F

## 2025-06-02 DIAGNOSIS — J02.9 ACUTE VIRAL PHARYNGITIS: Primary | ICD-10-CM

## 2025-06-02 LAB — STREP A PCR (OHS): NEGATIVE

## 2025-06-02 PROCEDURE — 63600175 PHARM REV CODE 636 W HCPCS: Mod: JZ,TB

## 2025-06-02 PROCEDURE — 87651 STREP A DNA AMP PROBE: CPT

## 2025-06-02 PROCEDURE — 99284 EMERGENCY DEPT VISIT MOD MDM: CPT | Mod: 25

## 2025-06-02 PROCEDURE — 96372 THER/PROPH/DIAG INJ SC/IM: CPT

## 2025-06-02 RX ORDER — DICLOFENAC SODIUM 50 MG/1
50 TABLET, DELAYED RELEASE ORAL 3 TIMES DAILY PRN
Qty: 15 TABLET | Refills: 0 | Status: SHIPPED | OUTPATIENT
Start: 2025-06-02

## 2025-06-02 RX ORDER — PREDNISONE 20 MG/1
40 TABLET ORAL DAILY
Qty: 10 TABLET | Refills: 0 | Status: SHIPPED | OUTPATIENT
Start: 2025-06-02 | End: 2025-06-07

## 2025-06-02 RX ORDER — METHYLPREDNISOLONE SOD SUCC 125 MG
125 VIAL (EA) INJECTION
Status: COMPLETED | OUTPATIENT
Start: 2025-06-02 | End: 2025-06-02

## 2025-06-02 RX ADMIN — METHYLPREDNISOLONE SODIUM SUCCINATE 125 MG: 125 INJECTION, POWDER, FOR SOLUTION INTRAMUSCULAR; INTRAVENOUS at 09:06

## 2025-06-23 ENCOUNTER — HOSPITAL ENCOUNTER (EMERGENCY)
Facility: HOSPITAL | Age: 60
Discharge: HOME | End: 2025-06-23
Attending: EMERGENCY MEDICINE
Payer: MEDICAID

## 2025-06-23 VITALS
TEMPERATURE: 99 F | RESPIRATION RATE: 18 BRPM | BODY MASS INDEX: 26.05 KG/M2 | HEIGHT: 70 IN | WEIGHT: 182 LBS | OXYGEN SATURATION: 95 % | DIASTOLIC BLOOD PRESSURE: 52 MMHG | SYSTOLIC BLOOD PRESSURE: 109 MMHG | HEART RATE: 71 BPM

## 2025-06-23 DIAGNOSIS — E86.0 DEHYDRATION: Primary | ICD-10-CM

## 2025-06-23 LAB
ABSOLUTE EOSINOPHIL (OHS): 0.35 K/UL
ABSOLUTE MONOCYTE (OHS): 1.18 K/UL (ref 0.3–1)
ABSOLUTE NEUTROPHIL COUNT (OHS): 7.44 K/UL (ref 1.8–7.7)
ALBUMIN SERPL BCP-MCNC: 3.7 G/DL (ref 3.5–5.2)
ALP SERPL-CCNC: 142 UNIT/L (ref 40–150)
ALT SERPL W/O P-5'-P-CCNC: 11 UNIT/L (ref 10–44)
ANION GAP (OHS): 10 MMOL/L (ref 8–16)
AST SERPL-CCNC: 15 UNIT/L (ref 11–45)
BASOPHILS # BLD AUTO: 0.03 K/UL
BASOPHILS NFR BLD AUTO: 0.3 %
BILIRUB SERPL-MCNC: 0.3 MG/DL (ref 0.1–1)
BUN SERPL-MCNC: 10 MG/DL (ref 6–20)
CALCIUM SERPL-MCNC: 8.6 MG/DL (ref 8.7–10.5)
CHLORIDE SERPL-SCNC: 103 MMOL/L (ref 95–110)
CO2 SERPL-SCNC: 26 MMOL/L (ref 23–29)
CREAT SERPL-MCNC: 0.6 MG/DL (ref 0.5–1.4)
ERYTHROCYTE [DISTWIDTH] IN BLOOD BY AUTOMATED COUNT: 12.1 % (ref 11.5–14.5)
GFR SERPLBLD CREATININE-BSD FMLA CKD-EPI: >60 ML/MIN/1.73/M2
GLUCOSE SERPL-MCNC: 129 MG/DL (ref 70–110)
HCT VFR BLD AUTO: 37.4 % (ref 40–54)
HGB BLD-MCNC: 12.9 GM/DL (ref 14–18)
IMM GRANULOCYTES # BLD AUTO: 0.03 K/UL (ref 0–0.04)
IMM GRANULOCYTES NFR BLD AUTO: 0.3 % (ref 0–0.5)
LYMPHOCYTES # BLD AUTO: 1.9 K/UL (ref 1–4.8)
MCH RBC QN AUTO: 30 PG (ref 27–31)
MCHC RBC AUTO-ENTMCNC: 34.5 G/DL (ref 32–36)
MCV RBC AUTO: 87 FL (ref 82–98)
NUCLEATED RBC (/100WBC) (OHS): 0 /100 WBC
PLATELET # BLD AUTO: 234 K/UL (ref 150–450)
PMV BLD AUTO: 9 FL (ref 9.2–12.9)
POTASSIUM SERPL-SCNC: 3.7 MMOL/L (ref 3.5–5.1)
PROT SERPL-MCNC: 7.5 GM/DL (ref 6–8.4)
RBC # BLD AUTO: 4.3 M/UL (ref 4.6–6.2)
RELATIVE EOSINOPHIL (OHS): 3.2 %
RELATIVE LYMPHOCYTE (OHS): 17.4 % (ref 18–48)
RELATIVE MONOCYTE (OHS): 10.8 % (ref 4–15)
RELATIVE NEUTROPHIL (OHS): 68 % (ref 38–73)
SODIUM SERPL-SCNC: 139 MMOL/L (ref 136–145)
WBC # BLD AUTO: 10.93 K/UL (ref 3.9–12.7)

## 2025-06-23 PROCEDURE — 36415 COLL VENOUS BLD VENIPUNCTURE: CPT | Performed by: NURSE PRACTITIONER

## 2025-06-23 PROCEDURE — 25000003 PHARM REV CODE 250: Performed by: NURSE PRACTITIONER

## 2025-06-23 PROCEDURE — 96361 HYDRATE IV INFUSION ADD-ON: CPT

## 2025-06-23 PROCEDURE — 80053 COMPREHEN METABOLIC PANEL: CPT | Performed by: NURSE PRACTITIONER

## 2025-06-23 PROCEDURE — 63600175 PHARM REV CODE 636 W HCPCS: Mod: JZ,TB | Performed by: NURSE PRACTITIONER

## 2025-06-23 PROCEDURE — 96374 THER/PROPH/DIAG INJ IV PUSH: CPT

## 2025-06-23 PROCEDURE — 85025 COMPLETE CBC W/AUTO DIFF WBC: CPT | Performed by: NURSE PRACTITIONER

## 2025-06-23 PROCEDURE — 96375 TX/PRO/DX INJ NEW DRUG ADDON: CPT

## 2025-06-23 PROCEDURE — 99284 EMERGENCY DEPT VISIT MOD MDM: CPT | Mod: 25

## 2025-06-23 RX ORDER — ACETAMINOPHEN 325 MG/1
650 TABLET ORAL
Status: COMPLETED | OUTPATIENT
Start: 2025-06-23 | End: 2025-06-23

## 2025-06-23 RX ORDER — ONDANSETRON HYDROCHLORIDE 2 MG/ML
8 INJECTION, SOLUTION INTRAVENOUS
Status: COMPLETED | OUTPATIENT
Start: 2025-06-23 | End: 2025-06-23

## 2025-06-23 RX ORDER — KETOROLAC TROMETHAMINE 30 MG/ML
15 INJECTION, SOLUTION INTRAMUSCULAR; INTRAVENOUS
Status: COMPLETED | OUTPATIENT
Start: 2025-06-23 | End: 2025-06-23

## 2025-06-23 RX ADMIN — KETOROLAC TROMETHAMINE 15 MG: 30 INJECTION, SOLUTION INTRAMUSCULAR; INTRAVENOUS at 06:06

## 2025-06-23 RX ADMIN — SODIUM CHLORIDE 1000 ML: 9 INJECTION, SOLUTION INTRAVENOUS at 06:06

## 2025-06-23 RX ADMIN — ACETAMINOPHEN 650 MG: 325 TABLET ORAL at 06:06

## 2025-06-23 RX ADMIN — ONDANSETRON 8 MG: 2 INJECTION INTRAMUSCULAR; INTRAVENOUS at 06:06
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
## 2025-06-26 ENCOUNTER — HOSPITAL ENCOUNTER (EMERGENCY)
Facility: HOSPITAL | Age: 60
Discharge: HOME | End: 2025-06-26
Attending: EMERGENCY MEDICINE
Payer: MEDICAID

## 2025-06-26 VITALS
WEIGHT: 180 LBS | HEART RATE: 69 BPM | RESPIRATION RATE: 18 BRPM | HEIGHT: 70 IN | OXYGEN SATURATION: 100 % | DIASTOLIC BLOOD PRESSURE: 93 MMHG | BODY MASS INDEX: 25.77 KG/M2 | TEMPERATURE: 98 F | SYSTOLIC BLOOD PRESSURE: 121 MMHG

## 2025-06-26 DIAGNOSIS — M62.838 MUSCLE SPASM: ICD-10-CM

## 2025-06-26 DIAGNOSIS — G89.4 CHRONIC PAIN SYNDROME: ICD-10-CM

## 2025-06-26 DIAGNOSIS — R07.89 CHEST WALL PAIN: Primary | ICD-10-CM

## 2025-06-26 PROCEDURE — 63600175 PHARM REV CODE 636 W HCPCS: Mod: JZ,TB | Performed by: EMERGENCY MEDICINE

## 2025-06-26 PROCEDURE — 99284 EMERGENCY DEPT VISIT MOD MDM: CPT | Mod: 25

## 2025-06-26 PROCEDURE — 96372 THER/PROPH/DIAG INJ SC/IM: CPT | Performed by: EMERGENCY MEDICINE

## 2025-06-26 PROCEDURE — 25000003 PHARM REV CODE 250: Performed by: EMERGENCY MEDICINE

## 2025-06-26 RX ORDER — CYCLOBENZAPRINE HCL 10 MG
10 TABLET ORAL 3 TIMES DAILY PRN
Qty: 30 TABLET | Refills: 0 | Status: SHIPPED | OUTPATIENT
Start: 2025-06-26

## 2025-06-26 RX ORDER — METHOCARBAMOL 500 MG/1
1500 TABLET, FILM COATED ORAL
Status: COMPLETED | OUTPATIENT
Start: 2025-06-26 | End: 2025-06-26

## 2025-06-26 RX ORDER — KETOROLAC TROMETHAMINE 30 MG/ML
60 INJECTION, SOLUTION INTRAMUSCULAR; INTRAVENOUS
Status: COMPLETED | OUTPATIENT
Start: 2025-06-26 | End: 2025-06-26

## 2025-06-26 RX ADMIN — KETOROLAC TROMETHAMINE 60 MG: 60 INJECTION, SOLUTION INTRAMUSCULAR at 11:06

## 2025-06-26 RX ADMIN — METHOCARBAMOL 1500 MG: 500 TABLET ORAL at 11:06
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
## 2025-07-07 ENCOUNTER — OFFICE VISIT (OUTPATIENT)
Dept: ORTHOPEDICS | Facility: CLINIC | Age: 60
End: 2025-07-07
Payer: MEDICAID

## 2025-07-07 DIAGNOSIS — M19.032 PRIMARY OSTEOARTHRITIS OF LEFT WRIST: Primary | ICD-10-CM

## 2025-07-07 DIAGNOSIS — M25.432 PAIN AND SWELLING OF LEFT WRIST: ICD-10-CM

## 2025-07-07 DIAGNOSIS — M25.532 PAIN AND SWELLING OF LEFT WRIST: ICD-10-CM

## 2025-07-07 PROCEDURE — 99999 PR PBB SHADOW E&M-EST. PATIENT-LVL III: CPT | Mod: PBBFAC,,, | Performed by: NURSE PRACTITIONER

## 2025-07-07 PROCEDURE — 99214 OFFICE O/P EST MOD 30 MIN: CPT | Mod: S$PBB,,, | Performed by: NURSE PRACTITIONER

## 2025-07-07 PROCEDURE — 99213 OFFICE O/P EST LOW 20 MIN: CPT | Mod: PBBFAC | Performed by: NURSE PRACTITIONER

## 2025-07-07 PROCEDURE — 1160F RVW MEDS BY RX/DR IN RCRD: CPT | Mod: CPTII,,, | Performed by: NURSE PRACTITIONER

## 2025-07-07 PROCEDURE — 1159F MED LIST DOCD IN RCRD: CPT | Mod: CPTII,,, | Performed by: NURSE PRACTITIONER

## 2025-07-07 RX ORDER — PREDNISONE 10 MG/1
10 TABLET ORAL SEE ADMIN INSTRUCTIONS
Qty: 7 TABLET | Refills: 0 | Status: SHIPPED | OUTPATIENT
Start: 2025-07-07

## 2025-07-07 RX ORDER — HYDROCODONE BITARTRATE AND ACETAMINOPHEN 5; 325 MG/1; MG/1
1 TABLET ORAL EVERY 6 HOURS PRN
Qty: 20 TABLET | Refills: 0 | Status: SHIPPED | OUTPATIENT
Start: 2025-07-07
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
## 2025-07-20 ENCOUNTER — HOSPITAL ENCOUNTER (EMERGENCY)
Facility: HOSPITAL | Age: 60
Discharge: HOME | End: 2025-07-20
Attending: EMERGENCY MEDICINE
Payer: MEDICAID

## 2025-07-20 VITALS
RESPIRATION RATE: 18 BRPM | BODY MASS INDEX: 24.78 KG/M2 | SYSTOLIC BLOOD PRESSURE: 138 MMHG | HEART RATE: 64 BPM | DIASTOLIC BLOOD PRESSURE: 72 MMHG | WEIGHT: 177 LBS | TEMPERATURE: 98 F | OXYGEN SATURATION: 98 % | HEIGHT: 71 IN

## 2025-07-20 DIAGNOSIS — K21.9 GERD (GASTROESOPHAGEAL REFLUX DISEASE): Primary | ICD-10-CM

## 2025-07-20 DIAGNOSIS — G89.4 CHRONIC PAIN SYNDROME: ICD-10-CM

## 2025-07-20 LAB — TROPONIN I SERPL HS-MCNC: 4 NG/L

## 2025-07-20 PROCEDURE — 99284 EMERGENCY DEPT VISIT MOD MDM: CPT | Mod: 25

## 2025-07-20 PROCEDURE — 93010 ELECTROCARDIOGRAM REPORT: CPT | Mod: ,,, | Performed by: INTERNAL MEDICINE

## 2025-07-20 PROCEDURE — 36415 COLL VENOUS BLD VENIPUNCTURE: CPT | Performed by: EMERGENCY MEDICINE

## 2025-07-20 PROCEDURE — 93005 ELECTROCARDIOGRAM TRACING: CPT

## 2025-07-20 PROCEDURE — 84484 ASSAY OF TROPONIN QUANT: CPT | Performed by: EMERGENCY MEDICINE

## 2025-07-20 PROCEDURE — 25000003 PHARM REV CODE 250: Performed by: EMERGENCY MEDICINE

## 2025-07-20 RX ORDER — HYDROCODONE BITARTRATE AND ACETAMINOPHEN 10; 325 MG/1; MG/1
1 TABLET ORAL
Refills: 0 | Status: COMPLETED | OUTPATIENT
Start: 2025-07-20 | End: 2025-07-20

## 2025-07-20 RX ORDER — LIDOCAINE HYDROCHLORIDE 20 MG/ML
15 SOLUTION OROPHARYNGEAL ONCE
Status: COMPLETED | OUTPATIENT
Start: 2025-07-20 | End: 2025-07-20

## 2025-07-20 RX ORDER — ALUMINUM HYDROXIDE, MAGNESIUM HYDROXIDE, AND SIMETHICONE 1200; 120; 1200 MG/30ML; MG/30ML; MG/30ML
30 SUSPENSION ORAL ONCE
Status: COMPLETED | OUTPATIENT
Start: 2025-07-20 | End: 2025-07-20

## 2025-07-20 RX ADMIN — ALUMINUM HYDROXIDE, MAGNESIUM HYDROXIDE, AND DIMETHICONE 30 ML: 200; 20; 200 SUSPENSION ORAL at 07:07

## 2025-07-20 RX ADMIN — LIDOCAINE HYDROCHLORIDE 15 ML: 20 SOLUTION ORAL at 07:07

## 2025-07-20 RX ADMIN — HYDROCODONE BITARTRATE AND ACETAMINOPHEN 1 TABLET: 10; 325 TABLET ORAL at 07:07

## 2025-07-21 LAB
OHS QRS DURATION: 86 MS
OHS QTC CALCULATION: 450 MS
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
## 2025-08-01 ENCOUNTER — HOSPITAL ENCOUNTER (EMERGENCY)
Facility: HOSPITAL | Age: 60
Discharge: HOME | End: 2025-08-01
Attending: EMERGENCY MEDICINE
Payer: MEDICAID

## 2025-08-01 VITALS
SYSTOLIC BLOOD PRESSURE: 158 MMHG | HEART RATE: 78 BPM | DIASTOLIC BLOOD PRESSURE: 74 MMHG | OXYGEN SATURATION: 97 % | TEMPERATURE: 99 F | RESPIRATION RATE: 16 BRPM

## 2025-08-01 DIAGNOSIS — K21.9 GERD (GASTROESOPHAGEAL REFLUX DISEASE): Primary | ICD-10-CM

## 2025-08-01 PROCEDURE — 93005 ELECTROCARDIOGRAM TRACING: CPT

## 2025-08-01 PROCEDURE — 93010 ELECTROCARDIOGRAM REPORT: CPT | Mod: ,,, | Performed by: INTERNAL MEDICINE

## 2025-08-01 PROCEDURE — 25000003 PHARM REV CODE 250: Performed by: EMERGENCY MEDICINE

## 2025-08-01 PROCEDURE — 99283 EMERGENCY DEPT VISIT LOW MDM: CPT | Mod: 25

## 2025-08-01 RX ORDER — LIDOCAINE HYDROCHLORIDE 20 MG/ML
15 SOLUTION OROPHARYNGEAL ONCE
Status: COMPLETED | OUTPATIENT
Start: 2025-08-01 | End: 2025-08-01

## 2025-08-01 RX ORDER — ALUMINUM HYDROXIDE, MAGNESIUM HYDROXIDE, AND SIMETHICONE 1200; 120; 1200 MG/30ML; MG/30ML; MG/30ML
30 SUSPENSION ORAL ONCE
Status: COMPLETED | OUTPATIENT
Start: 2025-08-01 | End: 2025-08-01

## 2025-08-01 RX ADMIN — LIDOCAINE HYDROCHLORIDE 15 ML: 20 SOLUTION ORAL at 10:08

## 2025-08-01 RX ADMIN — ALUMINUM HYDROXIDE, MAGNESIUM HYDROXIDE, AND DIMETHICONE 30 ML: 200; 20; 200 SUSPENSION ORAL at 10:08

## 2025-08-02 LAB
OHS QRS DURATION: 86 MS
OHS QTC CALCULATION: 442 MS
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
## (undated) DEVICE — SUT VICRYL 3-0 27 SH

## (undated) DEVICE — SKIN MARKER STER DUAL TIP

## (undated) DEVICE — COVER OVERHEAD SURG LT BLUE

## (undated) DEVICE — PAD DERMAPROX THCK 11X15X1IN

## (undated) DEVICE — SYR 10CC LUER LOCK

## (undated) DEVICE — PENCIL SMK EVAC CONNECTOR 10FT

## (undated) DEVICE — ELECTRODE REM PLYHSV RETURN 9

## (undated) DEVICE — SOL IRRI STRL WATER 1000ML

## (undated) DEVICE — UNDERGLOVES BIOGEL PI SIZE 7.5

## (undated) DEVICE — APPLICATOR CHLORAPREP ORN 26ML

## (undated) DEVICE — BLANKET WARMING UPPER BODY

## (undated) DEVICE — SUT MONOCYRL 4-0 PS2 UND

## (undated) DEVICE — STRAP KNEE & BODY DISP 4X34IN

## (undated) DEVICE — SOL NACL IRR 1000ML BTL

## (undated) DEVICE — UNDERPAD DELUXE FLUFF 30X30IN

## (undated) DEVICE — GLOVE SURG ULTRA TOUCH 7

## (undated) DEVICE — GLOVE SURGICAL LATEX SZ 6.5

## (undated) DEVICE — LINER GLOVE POWDERFREE SZ 6.5

## (undated) DEVICE — Device

## (undated) DEVICE — ADHESIVE DERMABOND ADVANCED